# Patient Record
Sex: MALE | Race: WHITE | NOT HISPANIC OR LATINO | Employment: OTHER | URBAN - METROPOLITAN AREA
[De-identification: names, ages, dates, MRNs, and addresses within clinical notes are randomized per-mention and may not be internally consistent; named-entity substitution may affect disease eponyms.]

---

## 2018-02-19 ENCOUNTER — TRANSCRIBE ORDERS (OUTPATIENT)
Dept: ADMINISTRATIVE | Facility: HOSPITAL | Age: 38
End: 2018-02-19

## 2018-02-19 ENCOUNTER — OFFICE VISIT (OUTPATIENT)
Dept: OBGYN CLINIC | Facility: MEDICAL CENTER | Age: 38
End: 2018-02-19
Payer: COMMERCIAL

## 2018-02-19 VITALS
SYSTOLIC BLOOD PRESSURE: 133 MMHG | HEART RATE: 96 BPM | HEIGHT: 71 IN | DIASTOLIC BLOOD PRESSURE: 87 MMHG | BODY MASS INDEX: 35 KG/M2 | WEIGHT: 250 LBS

## 2018-02-19 DIAGNOSIS — G89.29 CHRONIC PAIN OF LEFT KNEE: ICD-10-CM

## 2018-02-19 DIAGNOSIS — M25.531 RIGHT WRIST PAIN: ICD-10-CM

## 2018-02-19 DIAGNOSIS — M25.551 RIGHT HIP PAIN: Primary | ICD-10-CM

## 2018-02-19 DIAGNOSIS — M25.851 RIGHT HIP IMPINGEMENT SYNDROME: ICD-10-CM

## 2018-02-19 DIAGNOSIS — M25.562 CHRONIC PAIN OF LEFT KNEE: ICD-10-CM

## 2018-02-19 PROCEDURE — 99204 OFFICE O/P NEW MOD 45 MIN: CPT | Performed by: FAMILY MEDICINE

## 2018-02-19 RX ORDER — CHOLECALCIFEROL (VITAMIN D3) 50 MCG
TABLET ORAL
COMMUNITY
Start: 2018-01-05

## 2018-02-19 RX ORDER — ATORVASTATIN CALCIUM 80 MG/1
TABLET, FILM COATED ORAL
COMMUNITY
Start: 2018-01-03 | End: 2022-05-09 | Stop reason: ALTCHOICE

## 2018-02-19 RX ORDER — MELOXICAM 15 MG/1
15 TABLET ORAL
COMMUNITY
End: 2021-03-25 | Stop reason: ALTCHOICE

## 2018-02-19 RX ORDER — CYCLOBENZAPRINE HCL 10 MG
10 TABLET ORAL
COMMUNITY
Start: 2017-09-01 | End: 2021-03-22

## 2018-02-19 RX ORDER — LOSARTAN POTASSIUM 25 MG/1
25 TABLET ORAL
COMMUNITY
Start: 2017-07-27 | End: 2022-05-09 | Stop reason: ALTCHOICE

## 2018-02-19 NOTE — ASSESSMENT & PLAN NOTE
Patient with a history of right hip dislocation with reduction in 2004  X-ray demonstrates impingement, please evaluate for acetabulum labrum tear
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
## 2018-02-20 DIAGNOSIS — M25.569 KNEE PAIN, UNSPECIFIED CHRONICITY, UNSPECIFIED LATERALITY: Primary | ICD-10-CM

## 2018-03-05 ENCOUNTER — HOSPITAL ENCOUNTER (OUTPATIENT)
Dept: CT IMAGING | Facility: HOSPITAL | Age: 38
Discharge: HOME | End: 2018-03-05
Attending: FAMILY MEDICINE
Payer: COMMERCIAL

## 2018-03-05 ENCOUNTER — HOSPITAL ENCOUNTER (OUTPATIENT)
Dept: RADIOLOGY | Facility: HOSPITAL | Age: 38
Discharge: HOME | End: 2018-03-05
Attending: FAMILY MEDICINE | Admitting: RADIOLOGY
Payer: COMMERCIAL

## 2018-03-05 DIAGNOSIS — M25.551 RIGHT HIP PAIN: ICD-10-CM

## 2018-03-05 PROCEDURE — 73701 CT LOWER EXTREMITY W/DYE: CPT

## 2018-03-05 PROCEDURE — 27095 INJECTION FOR HIP X-RAY: CPT

## 2018-03-05 PROCEDURE — 77002 NEEDLE LOCALIZATION BY XRAY: CPT

## 2018-03-05 RX ORDER — LIDOCAINE HYDROCHLORIDE 10 MG/ML
15 INJECTION, SOLUTION EPIDURAL; INFILTRATION; INTRACAUDAL; PERINEURAL ONCE
Status: CANCELLED | OUTPATIENT
Start: 2018-03-05 | End: 2018-03-05

## 2018-03-05 RX ORDER — 0.9 % SODIUM CHLORIDE 0.9 %
30 VIAL (ML) INJECTION ONCE
Status: CANCELLED | OUTPATIENT
Start: 2018-03-05 | End: 2018-03-05

## 2018-03-05 RX ORDER — LIDOCAINE HYDROCHLORIDE 10 MG/ML
15 INJECTION, SOLUTION EPIDURAL; INFILTRATION; INTRACAUDAL; PERINEURAL ONCE
Status: COMPLETED | OUTPATIENT
Start: 2018-03-05 | End: 2018-03-05

## 2018-03-05 RX ORDER — 0.9 % SODIUM CHLORIDE 0.9 %
50 VIAL (ML) INJECTION ONCE
Status: COMPLETED | OUTPATIENT
Start: 2018-03-05 | End: 2018-03-05

## 2018-03-05 RX ADMIN — SODIUM CHLORIDE 3 ML: 9 INJECTION, SOLUTION INTRAMUSCULAR; INTRAVENOUS; SUBCUTANEOUS at 13:45

## 2018-03-05 RX ADMIN — LIDOCAINE HYDROCHLORIDE 15 ML: 10 INJECTION, SOLUTION EPIDURAL; INFILTRATION; INTRACAUDAL; PERINEURAL at 13:45

## 2018-03-05 RX ADMIN — IOHEXOL 3 ML: 300 INJECTION, SOLUTION INTRAVENOUS at 13:44

## 2018-03-08 ENCOUNTER — OFFICE VISIT (OUTPATIENT)
Dept: OBGYN CLINIC | Facility: MEDICAL CENTER | Age: 38
End: 2018-03-08
Payer: COMMERCIAL

## 2018-03-08 VITALS
HEART RATE: 69 BPM | DIASTOLIC BLOOD PRESSURE: 87 MMHG | BODY MASS INDEX: 35 KG/M2 | SYSTOLIC BLOOD PRESSURE: 136 MMHG | HEIGHT: 71 IN | WEIGHT: 250 LBS

## 2018-03-08 DIAGNOSIS — M24.151 DEGENERATIVE TEAR OF ACETABULAR LABRUM OF RIGHT HIP: ICD-10-CM

## 2018-03-08 DIAGNOSIS — M25.851 RIGHT HIP IMPINGEMENT SYNDROME: Primary | ICD-10-CM

## 2018-03-08 PROCEDURE — 99214 OFFICE O/P EST MOD 30 MIN: CPT | Performed by: FAMILY MEDICINE

## 2018-03-08 NOTE — PROGRESS NOTES
Assessment:   No diagnosis found  Plan:     Problem List Items Addressed This Visit     Right hip impingement syndrome - Primary    Relevant Orders    Ambulatory referral to Orthopedic Surgery    Degenerative tear of acetabular labrum of right hip         Subjective:     Patient ID: Neris Acevedo is a 40 y o  male  Chief Complaint:  Patient presents today for follow-up of his hip pain and CT results  Pain continues as a throbbing, achy pain with frequent clicking and snapping in the hip  No weakness or swelling  No numbness or tingling  Allergy:  No Known Allergies  Medications:  all current active meds have been reviewed  Past Medical History:  Past Medical History:   Diagnosis Date    Fractures     Head injury     Hypertension     PTSD (post-traumatic stress disorder)      Past Surgical History:  Past Surgical History:   Procedure Laterality Date    ANKLE SURGERY      ARM DEBRIDEMENT      HIP SURGERY      KNEE SURGERY      SHOULDER SURGERY      WRIST SURGERY       Family History:  Family History   Problem Relation Age of Onset    Adopted: Yes     Social History:  History   Alcohol Use    Yes     History   Drug Use No     History   Smoking Status    Former Smoker   Smokeless Tobacco    Former User     Review of Systems   Constitutional: Negative  HENT: Negative  Eyes: Negative  Respiratory: Negative  Cardiovascular: Negative  Gastrointestinal: Negative  Genitourinary: Negative  Musculoskeletal: Positive for arthralgias and myalgias  Skin: Negative  Allergic/Immunologic: Negative  Neurological: Negative  Hematological: Negative  Psychiatric/Behavioral: Negative  Objective:  BP Readings from Last 1 Encounters:   03/08/18 136/87      Wt Readings from Last 1 Encounters:   03/08/18 113 kg (250 lb)      BMI:   Estimated body mass index is 34 87 kg/m² as calculated from the following:    Height as of this encounter: 5' 11" (1 803 m)      Weight as of this encounter: 113 kg (250 lb)  BSA:   Estimated body surface area is 2 32 meters squared as calculated from the following:    Height as of this encounter: 5' 11" (1 803 m)  Weight as of this encounter: 113 kg (250 lb)  Physical Exam   Constitutional: He appears well-developed  Eyes: Pupils are equal, round, and reactive to light  Neck: Normal range of motion  Pulmonary/Chest: Effort normal    Musculoskeletal: Normal range of motion  Neurological: He is alert  He has normal strength and normal reflexes  Gait normal    Skin: Skin is warm  Psychiatric: He has a normal mood and affect  Right Ankle Exam   Right ankle exam is normal     Range of Motion   The patient has normal right ankle ROM  Muscle Strength   The patient has normal right ankle strength  Other   Erythema: present       Left Knee Exam     Tenderness   The patient is experiencing tenderness in the lateral joint line  Range of Motion   Extension: normal   Flexion: normal     Tests   Santy:  Medial - negative Lateral - negative  Lachman:  Anterior - negative    Posterior - negative  Drawer:       Anterior - negative     Posterior - negative  Varus: negative  Valgus: negative  Pivot Shift: negative  Patellar Apprehension: negative    Other   Erythema: absent      Right Hip Exam   Right hip exam is normal      Range of Motion   Extension: normal   Flexion: normal   Internal Rotation: normal   External Rotation: normal   Abduction: normal   Adduction: normal     Muscle Strength   The patient has normal right hip strength  Tests   YOVANI: positive    Other   Erythema: absent      Right Hand Exam     Tenderness   The patient is experiencing tenderness in the dorsal area and snuff box  Range of Motion     Wrist   Extension: normal   Flexion: normal   Pronation: normal   Supination: normal     Hand   MP Thumb: normal   DIP Thumb: normal     Muscle Strength   The patient has normal right wrist strength      Tests   Phalens Sign: negative    Other   Erythema: absent  Sensation: normal  Pulse: present            I have personally reviewed pertinent films in PACS and my interpretation is There is a large right acetabular labral tear with a large portion of the anterior acetabular labrum absent

## 2018-06-01 ENCOUNTER — OFFICE VISIT (OUTPATIENT)
Dept: PHYSICAL THERAPY | Facility: CLINIC | Age: 38
End: 2018-06-01

## 2020-03-21 ENCOUNTER — HOSPITAL ENCOUNTER (EMERGENCY)
Facility: HOSPITAL | Age: 40
Discharge: HOME | End: 2020-03-22
Attending: EMERGENCY MEDICINE
Payer: MEDICARE

## 2020-03-21 ENCOUNTER — APPOINTMENT (EMERGENCY)
Dept: RADIOLOGY | Facility: HOSPITAL | Age: 40
End: 2020-03-21
Payer: MEDICARE

## 2020-03-21 DIAGNOSIS — I10 HYPERTENSION, UNSPECIFIED TYPE: Primary | ICD-10-CM

## 2020-03-21 LAB
ANION GAP SERPL CALCULATED.3IONS-SCNC: 9 MMOL/L (ref 4–13)
BASOPHILS # BLD AUTO: 0.04 THOUSANDS/ΜL (ref 0–0.1)
BASOPHILS NFR BLD AUTO: 1 % (ref 0–1)
BUN SERPL-MCNC: 19 MG/DL (ref 5–25)
CALCIUM SERPL-MCNC: 9 MG/DL (ref 8.3–10.1)
CHLORIDE SERPL-SCNC: 102 MMOL/L (ref 100–108)
CO2 SERPL-SCNC: 28 MMOL/L (ref 21–32)
CREAT SERPL-MCNC: 1.06 MG/DL (ref 0.6–1.3)
EOSINOPHIL # BLD AUTO: 0.36 THOUSAND/ΜL (ref 0–0.61)
EOSINOPHIL NFR BLD AUTO: 5 % (ref 0–6)
ERYTHROCYTE [DISTWIDTH] IN BLOOD BY AUTOMATED COUNT: 13.7 % (ref 11.6–15.1)
GFR SERPL CREATININE-BSD FRML MDRD: 89 ML/MIN/1.73SQ M
GLUCOSE SERPL-MCNC: 117 MG/DL (ref 65–140)
HCT VFR BLD AUTO: 43.2 % (ref 36.5–49.3)
HGB BLD-MCNC: 14.8 G/DL (ref 12–17)
LYMPHOCYTES # BLD AUTO: 2.99 THOUSANDS/ΜL (ref 0.6–4.47)
LYMPHOCYTES NFR BLD AUTO: 39 % (ref 14–44)
MCH RBC QN AUTO: 28.2 PG (ref 26.8–34.3)
MCHC RBC AUTO-ENTMCNC: 34.3 G/DL (ref 31.4–37.4)
MCV RBC AUTO: 82 FL (ref 82–98)
MONOCYTES # BLD AUTO: 0.63 THOUSAND/ΜL (ref 0.17–1.22)
MONOCYTES NFR BLD AUTO: 8 % (ref 4–12)
NEUTROPHILS # BLD AUTO: 3.59 THOUSANDS/ΜL (ref 1.85–7.62)
NEUTS SEG NFR BLD AUTO: 47 % (ref 43–75)
PLATELET # BLD AUTO: 328 THOUSANDS/UL (ref 149–390)
PMV BLD AUTO: 8.8 FL (ref 8.9–12.7)
POTASSIUM SERPL-SCNC: 3.6 MMOL/L (ref 3.5–5.3)
RBC # BLD AUTO: 5.24 MILLION/UL (ref 3.88–5.62)
SODIUM SERPL-SCNC: 139 MMOL/L (ref 136–145)
TROPONIN I SERPL-MCNC: <0.02 NG/ML
WBC # BLD AUTO: 7.61 THOUSAND/UL (ref 4.31–10.16)

## 2020-03-21 PROCEDURE — 36415 COLL VENOUS BLD VENIPUNCTURE: CPT | Performed by: EMERGENCY MEDICINE

## 2020-03-21 PROCEDURE — 84484 ASSAY OF TROPONIN QUANT: CPT | Performed by: EMERGENCY MEDICINE

## 2020-03-21 PROCEDURE — 80048 BASIC METABOLIC PNL TOTAL CA: CPT | Performed by: EMERGENCY MEDICINE

## 2020-03-21 PROCEDURE — 85025 COMPLETE CBC W/AUTO DIFF WBC: CPT | Performed by: EMERGENCY MEDICINE

## 2020-03-21 PROCEDURE — 71046 X-RAY EXAM CHEST 2 VIEWS: CPT

## 2020-03-21 PROCEDURE — 99284 EMERGENCY DEPT VISIT MOD MDM: CPT | Performed by: EMERGENCY MEDICINE

## 2020-03-21 PROCEDURE — 99284 EMERGENCY DEPT VISIT MOD MDM: CPT

## 2020-03-22 VITALS
HEART RATE: 67 BPM | WEIGHT: 251.32 LBS | HEIGHT: 71 IN | DIASTOLIC BLOOD PRESSURE: 90 MMHG | BODY MASS INDEX: 35.19 KG/M2 | OXYGEN SATURATION: 98 % | TEMPERATURE: 98.9 F | SYSTOLIC BLOOD PRESSURE: 130 MMHG | RESPIRATION RATE: 22 BRPM

## 2020-03-22 PROCEDURE — 96374 THER/PROPH/DIAG INJ IV PUSH: CPT

## 2020-03-22 RX ORDER — ACETAMINOPHEN 325 MG/1
975 TABLET ORAL ONCE
Status: COMPLETED | OUTPATIENT
Start: 2020-03-22 | End: 2020-03-22

## 2020-03-22 RX ORDER — LOSARTAN POTASSIUM 25 MG/1
25 TABLET ORAL DAILY
Qty: 30 TABLET | Refills: 0 | Status: SHIPPED | OUTPATIENT
Start: 2020-03-22 | End: 2022-05-09 | Stop reason: SDUPTHER

## 2020-03-22 RX ORDER — LOSARTAN POTASSIUM 25 MG/1
25 TABLET ORAL ONCE
Status: COMPLETED | OUTPATIENT
Start: 2020-03-22 | End: 2020-03-22

## 2020-03-22 RX ORDER — KETOROLAC TROMETHAMINE 30 MG/ML
30 INJECTION, SOLUTION INTRAMUSCULAR; INTRAVENOUS ONCE
Status: COMPLETED | OUTPATIENT
Start: 2020-03-22 | End: 2020-03-22

## 2020-03-22 RX ADMIN — KETOROLAC TROMETHAMINE 30 MG: 30 INJECTION, SOLUTION INTRAMUSCULAR at 01:40

## 2020-03-22 RX ADMIN — ACETAMINOPHEN 975 MG: 325 TABLET ORAL at 00:12

## 2020-03-22 RX ADMIN — LOSARTAN POTASSIUM 25 MG: 25 TABLET, FILM COATED ORAL at 01:41
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
## 2021-02-08 RX ORDER — ACETAMINOPHEN 160 MG
TABLET,DISINTEGRATING ORAL
COMMUNITY

## 2021-02-08 RX ORDER — LORATADINE 10 MG/1
TABLET ORAL
COMMUNITY

## 2021-02-10 ENCOUNTER — OFFICE VISIT (OUTPATIENT)
Dept: GASTROENTEROLOGY | Facility: CLINIC | Age: 41
End: 2021-02-10
Payer: COMMERCIAL

## 2021-02-10 VITALS
BODY MASS INDEX: 34.73 KG/M2 | SYSTOLIC BLOOD PRESSURE: 140 MMHG | DIASTOLIC BLOOD PRESSURE: 90 MMHG | HEART RATE: 86 BPM | WEIGHT: 249 LBS

## 2021-02-10 DIAGNOSIS — D13.2 DUODENAL ADENOMA: Primary | ICD-10-CM

## 2021-02-10 PROCEDURE — 99203 OFFICE O/P NEW LOW 30 MIN: CPT | Performed by: PHYSICIAN ASSISTANT

## 2021-02-10 RX ORDER — ALPRAZOLAM 0.5 MG/1
TABLET ORAL
COMMUNITY
Start: 2020-08-19

## 2021-02-10 RX ORDER — LIDOCAINE 50 MG/G
PATCH TOPICAL
COMMUNITY
Start: 2021-02-04 | End: 2021-03-22

## 2021-02-10 RX ORDER — SERTRALINE HYDROCHLORIDE 100 MG/1
TABLET, FILM COATED ORAL
COMMUNITY
Start: 2020-08-19

## 2021-02-10 NOTE — PROGRESS NOTES
[facility] - Outpatient Consultation  Cristiana Rocha 45 y o  male MRN: [id number]  Encounter: [id number]          ASSESSMENT AND PLAN:      1  Duodenal adenoma  Diagnosed and removed in 2019  It was removed but could not be well visualized with a standard endoscopy scope and so a side viewing scope was requested - this was performed by Dr Julio César Castellanos at [facility] who did not note any residual tissue  [facility]   Will plan EGD with side viewing scope here but patient understands it a polyp is found he may need [place] [facility] [place] to proceed this way  Case d/w Dr Gary Taylor    ______________________________________________________________________    HPI:    80-year-old male presents for evaluation prior to scheduling EGD  He underwent a routine upper endoscopy in 2019 at the [place]  At that time he was found to have a duodenal polyp which was  removed  The pathology showed adenomatous tissue  He was then referred for EGD with side viewing scope and EMR if residual tissue  The enteroscopy showed no residual tissue  He was noted to have esophagitis and a hiatal hernia  He reports issues with panic attacks after eating  He feels his heart is racing any short of breath  He has been prescribed Xanax for this which does help  He denies any heartburn or dysphagia  REVIEW OF SYSTEMS:    CONSTITUTIONAL: Denies any fever, chills, rigors, and weight loss  HEENT: No earache or tinnitus  Denies hearing loss or visual disturbances  CARDIOVASCULAR: No chest pain or palpitations  RESPIRATORY: Denies any cough, hemoptysis, shortness of breath or dyspnea on exertion  GASTROINTESTINAL: As noted in the History of Present Illness  GENITOURINARY: No problems with urination  Denies any hematuria or dysuria  NEUROLOGIC: No dizziness or vertigo, denies headaches  MUSCULOSKELETAL: Denies any muscle or joint pain  SKIN: Denies skin rashes or itching  ENDOCRINE: Denies excessive thirst  Denies intolerance to heat or cold  PSYCHOSOCIAL: Denies depression or anxiety  Denies any recent memory loss  Historical Information   Past Medical History:   Diagnosis Date    Fractures     Head injury     Hypertension     PTSD (post-traumatic stress disorder)      Past Surgical History:   Procedure Laterality Date    ANKLE SURGERY      ARM DEBRIDEMENT      HIP SURGERY      KNEE SURGERY      SHOULDER SURGERY      WRIST SURGERY       Social History   Social History     Substance and Sexual Activity   Alcohol Use Yes     Social History     Substance and Sexual Activity   Drug Use No     Social History     Tobacco Use   Smoking Status Former Smoker   Smokeless Tobacco Former User     Family History   Adopted: Yes       Meds/Allergies       Current Outpatient Medications:     ALPRAZolam (XANAX) 0 5 mg tablet    Cholecalciferol (Vitamin D3) 50 MCG (2000 UT) capsule    cyclobenzaprine (FLEXERIL) 10 mg tablet    loratadine (CLARITIN) 10 mg tablet    losartan (COZAAR) 25 mg tablet    losartan (COZAAR) 25 mg tablet    sertraline (ZOLOFT) 100 mg tablet    VITAMIN D3 SUPER STRENGTH 2000 units tablet    lidocaine (LIDODERM) 5 %    LIPITOR 80 MG tablet    meloxicam (MOBIC) 15 mg tablet    No Known Allergies        Objective     Blood pressure 140/90, pulse 86, weight 113 kg (249 lb)  Body mass index is 34 73 kg/m²  PHYSICAL EXAM:      General Appearance:   Alert, cooperative, no distress   HEENT:   Normocephalic, atraumatic, anicteric      Neck:  Supple, symmetrical, trachea midline   Lungs:   Clear to auscultation bilaterally; no rales, rhonchi or wheezing; respirations unlabored    Heart[de-identified]   Regular rate and rhythm; no murmur, rub, or gallop     Abdomen:   Soft, non-tender, non-distended; normal bowel sounds; no masses, no organomegaly    Genitalia:   Deferred    Rectal:   Deferred    Extremities:  No cyanosis, clubbing or edema    Pulses:  2+ and symmetric    Skin:  No jaundice, rashes, or lesions    Lymph nodes:  No palpable cervical lymphadenopathy        Lab Results:   No visits with results within 1 Day(s) from this visit  Latest known visit with results is:   Admission on 03/21/2020, Discharged on 03/22/2020   Component Date Value    WBC 03/21/2020 7 61     RBC 03/21/2020 5 24     Hemoglobin 03/21/2020 14 8     Hematocrit 03/21/2020 43 2     MCV 03/21/2020 82     MCH 03/21/2020 28 2     MCHC 03/21/2020 34 3     RDW 03/21/2020 13 7     MPV 03/21/2020 8 8*    Platelets 96/77/3011 328     Neutrophils Relative 03/21/2020 47     Lymphocytes Relative 03/21/2020 39     Monocytes Relative 03/21/2020 8     Eosinophils Relative 03/21/2020 5     Basophils Relative 03/21/2020 1     Neutrophils Absolute 03/21/2020 3 59     Lymphocytes Absolute 03/21/2020 2 99     Monocytes Absolute 03/21/2020 0 63     Eosinophils Absolute 03/21/2020 0 36     Basophils Absolute 03/21/2020 0 04     Sodium 03/21/2020 139     Potassium 03/21/2020 3 6     Chloride 03/21/2020 102     CO2 03/21/2020 28     ANION GAP 03/21/2020 9     BUN 03/21/2020 19     Creatinine 03/21/2020 1 06     Glucose 03/21/2020 117     Calcium 03/21/2020 9 0     eGFR 03/21/2020 89     Troponin I 03/21/2020 <0 02          Radiology Results:   No results found

## 2021-02-15 ENCOUNTER — TELEPHONE (OUTPATIENT)
Dept: GASTROENTEROLOGY | Facility: CLINIC | Age: 41
End: 2021-02-15

## 2021-03-04 ENCOUNTER — PREP FOR PROCEDURE (OUTPATIENT)
Dept: GASTROENTEROLOGY | Facility: CLINIC | Age: 41
End: 2021-03-04

## 2021-03-04 DIAGNOSIS — D13.2 DUODENAL ADENOMA: Primary | ICD-10-CM

## 2021-03-22 ENCOUNTER — CONSULT (OUTPATIENT)
Dept: PAIN MEDICINE | Facility: CLINIC | Age: 41
End: 2021-03-22
Payer: MEDICARE

## 2021-03-22 VITALS
BODY MASS INDEX: 35.87 KG/M2 | SYSTOLIC BLOOD PRESSURE: 145 MMHG | HEIGHT: 71 IN | RESPIRATION RATE: 16 BRPM | DIASTOLIC BLOOD PRESSURE: 92 MMHG | WEIGHT: 256.2 LBS | HEART RATE: 76 BPM

## 2021-03-22 DIAGNOSIS — M54.2 CHRONIC NECK PAIN: ICD-10-CM

## 2021-03-22 DIAGNOSIS — M79.18 MYOFASCIAL PAIN SYNDROME: ICD-10-CM

## 2021-03-22 DIAGNOSIS — G89.29 CHRONIC NECK PAIN: ICD-10-CM

## 2021-03-22 DIAGNOSIS — M62.838 CERVICAL PARASPINAL MUSCLE SPASM: Primary | ICD-10-CM

## 2021-03-22 DIAGNOSIS — M48.02 CERVICAL SPINAL STENOSIS: ICD-10-CM

## 2021-03-22 PROCEDURE — 99204 OFFICE O/P NEW MOD 45 MIN: CPT | Performed by: STUDENT IN AN ORGANIZED HEALTH CARE EDUCATION/TRAINING PROGRAM
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
## 2021-03-22 NOTE — PATIENT INSTRUCTIONS
Trigger Point Injection   AMBULATORY CARE:   A trigger point injection  is used to relax a muscle knot  This helps relieve pain  You may be able to have more than one trigger point treated during a session  How to prepare for a trigger point injection:   · Your healthcare provider will tell you how to prepare  Arrange to have someone drive you home after the injection  · Tell your provider about all medicines you take, including pain medicine, blood thinners, and muscle relaxers  He or she will tell you if you need to stop any medicine for the injection, and when to stop  He or she will tell you which medicines to take or not take on the day of the injection  · Tell your provider about all your allergies, including to any pain medicine  What will happen during a trigger point injection:   · You may be sitting or lying, depending on where the trigger point is located  Your healthcare provider will feel for a knot in the muscle  He or she may capri your skin over the knot  · Your provider will put a needle through your skin and into the trigger point  Saline (salt solution), pain relievers, or other medicines may be pushed through the needle into the trigger point  Your provider may use only a dry needle (no medicine)  He or she will pull the needle almost all the way out and then push it in again  He or she will repeat this several times until the muscle stops twitching or feels relaxed  · Your provider will remove the needle and stretch the muscle area  He or she may apply pressure to the area for 2 minutes  A bandage will be put over the injection site to prevent bleeding or an infection  What to expect after a trigger point injection:   · You may feel pain relief right away  It is normal for some pain to start again 2 hours later  An ice pack or over-the-counter pain medicine can help lower the pain  · You may feel sore in the injection site for a few days   If you need another injection in the same area, wait until the area is not sore  · Your healthcare provider may give you specific activity instructions to follow at home or recommend physical therapy  In general, you should try to stay active  Avoid strenuous activity for the first 3 or 4 days after the injection  · Do not have more injections if you still have trigger point pain after 2 or 3 injections  Risks of a trigger point injection:  You may have a severe allergic reaction to pain medicine injected  The injection may be painful, or you may be sore where you got the injection  You may bleed, bruise, or develop an infection in the injection area  The injection may cause you to feel faint  Rarely, the needle may cause muscle or blood vessel damage or your lung may collapse if you get the injection near your chest   Call your local emergency number (911 in the [address] [place] [address],[address]), or have someone call if:   · Your mouth and throat are swollen  · You are wheezing or have trouble breathing  · You have chest pain or your heart is beating faster than usual     · You feel like you are going to faint  When should I seek immediate care? · Your face is red or swollen  · You have hives that spread over your body  · You feel weak or dizzy  Call your doctor or pain specialist if:   · You have a fever within 1 week of the injection  · You have redness or swelling within 1 week of the injection  · You have new or worsening pain near the injection site  · You have questions or concerns about your condition or care  Self-care:   · Stay active after you have trigger point injections  Gently move your joints through their full range of motion during the first week  Avoid strenuous activity for 3 or 4 days  · Do regular stretches of the trigger point muscle  Place gentle pressure on the trigger point, and then stretch the muscle  Ask your healthcare provider for more information about how to stretch and apply pressure      · Apply ice to the injection site  Use an ice pack, or put ice in a plastic bag  Cover the bag with a towel before you apply it  Apply ice for 15 to 20 minutes every hour, or as directed  · Apply heat to trigger point sites  Heat can help relax muscles and relieve trigger point pain  Use a heat pack or a heating pad set on low  Apply heat for 15 minutes every hour, or as directed  Follow up with your doctor or pain specialist as directed:  Write down your questions so you remember to ask them during your visits  © Copyright [address] Information is for End User's use only and may not be sold, redistributed or otherwise used for commercial purposes  All illustrations and images included in CareNotes® are the copyrighted property of A D A M , Inc  or [address] [place], [place] [address]kay trang   The above information is an  only  It is not intended as medical advice for individual conditions or treatments  Talk to your doctor, nurse or pharmacist before following any medical regimen to see if it is safe and effective for you

## 2021-03-25 ENCOUNTER — ANESTHESIA EVENT (OUTPATIENT)
Dept: GASTROENTEROLOGY | Facility: HOSPITAL | Age: 41
End: 2021-03-25

## 2021-03-25 ENCOUNTER — ANESTHESIA (OUTPATIENT)
Dept: GASTROENTEROLOGY | Facility: HOSPITAL | Age: 41
End: 2021-03-25

## 2021-03-25 ENCOUNTER — HOSPITAL ENCOUNTER (OUTPATIENT)
Dept: GASTROENTEROLOGY | Facility: HOSPITAL | Age: 41
Setting detail: OUTPATIENT SURGERY
Discharge: HOME | End: 2021-03-25
Attending: INTERNAL MEDICINE
Payer: COMMERCIAL

## 2021-03-25 VITALS
RESPIRATION RATE: 20 BRPM | BODY MASS INDEX: 35.46 KG/M2 | OXYGEN SATURATION: 97 % | DIASTOLIC BLOOD PRESSURE: 80 MMHG | SYSTOLIC BLOOD PRESSURE: 133 MMHG | TEMPERATURE: 97.8 F | HEART RATE: 70 BPM | WEIGHT: 253.31 LBS | HEIGHT: 71 IN

## 2021-03-25 DIAGNOSIS — D13.2 DUODENAL ADENOMA: ICD-10-CM

## 2021-03-25 PROCEDURE — 88305 TISSUE EXAM BY PATHOLOGIST: CPT | Performed by: PATHOLOGY

## 2021-03-25 PROCEDURE — 43239 EGD BIOPSY SINGLE/MULTIPLE: CPT | Performed by: INTERNAL MEDICINE

## 2021-03-25 RX ORDER — SODIUM CHLORIDE, SODIUM LACTATE, POTASSIUM CHLORIDE, CALCIUM CHLORIDE 600; 310; 30; 20 MG/100ML; MG/100ML; MG/100ML; MG/100ML
125 INJECTION, SOLUTION INTRAVENOUS CONTINUOUS
Status: CANCELLED | OUTPATIENT
Start: 2021-03-25

## 2021-03-25 RX ORDER — CYCLOBENZAPRINE HCL 5 MG
5 TABLET ORAL 3 TIMES DAILY PRN
COMMUNITY
End: 2022-08-03

## 2021-03-25 RX ORDER — GLYCOPYRROLATE 0.2 MG/ML
INJECTION INTRAMUSCULAR; INTRAVENOUS AS NEEDED
Status: DISCONTINUED | OUTPATIENT
Start: 2021-03-25 | End: 2021-03-25

## 2021-03-25 RX ORDER — SODIUM CHLORIDE, SODIUM LACTATE, POTASSIUM CHLORIDE, CALCIUM CHLORIDE 600; 310; 30; 20 MG/100ML; MG/100ML; MG/100ML; MG/100ML
INJECTION, SOLUTION INTRAVENOUS CONTINUOUS PRN
Status: DISCONTINUED | OUTPATIENT
Start: 2021-03-25 | End: 2021-03-25

## 2021-03-25 RX ORDER — LIDOCAINE HYDROCHLORIDE 10 MG/ML
INJECTION, SOLUTION EPIDURAL; INFILTRATION; INTRACAUDAL; PERINEURAL AS NEEDED
Status: DISCONTINUED | OUTPATIENT
Start: 2021-03-25 | End: 2021-03-25

## 2021-03-25 RX ORDER — PROPOFOL 10 MG/ML
INJECTION, EMULSION INTRAVENOUS AS NEEDED
Status: DISCONTINUED | OUTPATIENT
Start: 2021-03-25 | End: 2021-03-25

## 2021-03-25 RX ORDER — KETAMINE HCL IN NACL, ISO-OSM 100MG/10ML
SYRINGE (ML) INJECTION AS NEEDED
Status: DISCONTINUED | OUTPATIENT
Start: 2021-03-25 | End: 2021-03-25

## 2021-03-25 RX ADMIN — PROPOFOL 120 MG: 10 INJECTION, EMULSION INTRAVENOUS at 14:21

## 2021-03-25 RX ADMIN — PROPOFOL 30 MG: 10 INJECTION, EMULSION INTRAVENOUS at 14:26

## 2021-03-25 RX ADMIN — GLYCOPYRROLATE 0.2 MG: 0.2 INJECTION, SOLUTION INTRAMUSCULAR; INTRAVENOUS at 14:18

## 2021-03-25 RX ADMIN — PROPOFOL 50 MG: 10 INJECTION, EMULSION INTRAVENOUS at 14:31

## 2021-03-25 RX ADMIN — PROPOFOL 30 MG: 10 INJECTION, EMULSION INTRAVENOUS at 14:23

## 2021-03-25 RX ADMIN — PROPOFOL 20 MG: 10 INJECTION, EMULSION INTRAVENOUS at 14:25

## 2021-03-25 RX ADMIN — LIDOCAINE HYDROCHLORIDE 100 MG: 10 INJECTION, SOLUTION EPIDURAL; INFILTRATION; INTRACAUDAL; PERINEURAL at 14:20

## 2021-03-25 RX ADMIN — SODIUM CHLORIDE, SODIUM LACTATE, POTASSIUM CHLORIDE, AND CALCIUM CHLORIDE: .6; .31; .03; .02 INJECTION, SOLUTION INTRAVENOUS at 14:12

## 2021-03-25 RX ADMIN — Medication 30 MG: at 14:20

## 2021-03-25 NOTE — ANESTHESIA PREPROCEDURE EVALUATION
Procedure:  EGD    Relevant Problems   No relevant active problems      1  Duodenal adenoma  Diagnosed and removed in 2019  It was removed but could not be well visualized with a standard endoscopy scope and so a side viewing scope was requested - this was performed by Dr Mattie Padilla at [facility] who did not note any residual tissue     Physical Exam    Airway    Mallampati score: II  TM Distance: >3 FB  Neck ROM: full     Dental       Cardiovascular  Cardiovascular exam normal    Pulmonary  Pulmonary exam normal     Other Findings      Fractures  Hypertension    Head injury  PTSD (post-traumatic stress disorder)    Hyperlipidemia          Anesthesia Plan  ASA Score- 2     Anesthesia Type- IV sedation with anesthesia with ASA Monitors  Additional Monitors:   Airway Plan:           Plan Factors-Exercise tolerance (METS): >4 METS  Chart reviewed  Existing labs reviewed  Patient summary reviewed  Induction- intravenous  Postoperative Plan-     Informed Consent- Anesthetic plan and risks discussed with patient  I personally reviewed this patient with the CRNA  Discussed and agreed on the Anesthesia Plan with the CRNA  Conrad Falk

## 2021-03-25 NOTE — DISCHARGE INSTRUCTIONS
Upper Endoscopy   WHAT YOU NEED TO KNOW:   An upper endoscopy is also called an upper gastrointestinal (GI) endoscopy, or an esophagogastroduodenoscopy (EGD)  You may feel bloated, gassy, or have some abdominal discomfort after your procedure  Your throat may be sore for 24 to 36 hours  You may burp or pass gas from air that is still inside your body  DISCHARGE INSTRUCTIONS:   Call 911 if:   · You have sudden chest pain or trouble breathing  Seek care immediately if:   · You feel dizzy or faint  · You have trouble swallowing  · You have severe throat pain  · Your bowel movements are very dark or black  · Your abdomen is hard and firm and you have severe pain  · You vomit blood  Contact your healthcare provider if:   · You feel full or bloated and cannot burp or pass gas  · You have not had a bowel movement for 3 days after your procedure  · You have neck pain  · You have a fever or chills  · You have nausea or are vomiting  · You have a rash or hives  · You have questions or concerns about your endoscopy  Relieve a sore throat:  Suck on throat lozenges or crushed ice  Gargle with a small amount of warm salt water  Mix 1 teaspoon of salt and 1 cup of warm water to make salt water  Relieve gas and discomfort from bloating:  Lie on your right side with a heating pad on your abdomen  Take short walks to help pass gas  Eat small meals until bloating is relieved  Rest after your procedure:  Do not drive or make important decisions until the day after your procedure  Return to your normal activity as directed  You can usually return to work the day after your procedure  Follow up with your healthcare provider as directed:  Write down your questions so you remember to ask them during your visits  © Copyright [address] Information is for End User's use only and may not be sold, redistributed or otherwise used for commercial purposes   All illustrations and images included in CareNotes® are the copyrighted property of A D A M , Inc  or Dom Bowden   The above information is an  only  It is not intended as medical advice for individual conditions or treatments  Talk to your doctor, nurse or pharmacist before following any medical regimen to see if it is safe and effective for you

## 2021-03-25 NOTE — ANESTHESIA POSTPROCEDURE EVALUATION
Post-Op Assessment Note    CV Status:  Stable    Pain management: adequate     Mental Status:  Awake   Hydration Status:  Euvolemic   PONV Controlled:  Controlled   Airway Patency:  Patent      Post Op Vitals Reviewed: Yes      Staff: CRNA         No complications documented      /90 (03/25/21 1437)    Temp 97 8 °F (36 6 °C) (03/25/21 1437)    Pulse 74 (03/25/21 1437)   Resp 20 (03/25/21 1437)    SpO2 97 % (03/25/21 1437)

## 2021-03-25 NOTE — H&P
History and Physical - [facility]  Patricia Israel 45 y o  male MRN: [id number]      HPI: Patricia Israel is a 45y o  year old male who presents for evaluation of a history of a periampullary adenoma, previously resected      REVIEW OF SYSTEMS: Per the HPI, and otherwise unremarkable  Historical Information   Past Medical History:   Diagnosis Date    Fractures     Head injury     Hyperlipidemia     Hypertension     PTSD (post-traumatic stress disorder)      Past Surgical History:   Procedure Laterality Date    ANKLE SURGERY      ARM DEBRIDEMENT      COLONOSCOPY      HIP SURGERY      KNEE SURGERY      SHOULDER SURGERY      WRIST SURGERY       Social History   Social History     Substance and Sexual Activity   Alcohol Use Not Currently     Social History     Substance and Sexual Activity   Drug Use No     Social History     Tobacco Use   Smoking Status Former Smoker   Smokeless Tobacco Former User     Family History   Adopted: Yes       Meds/Allergies     (Not in a hospital admission)      No Known Allergies    Objective     Blood pressure 135/80, pulse 66, temperature 97 6 °F (36 4 °C), temperature source Temporal, resp  rate 16, height 5' 11" (1 803 m), weight 115 kg (253 lb 4 9 oz), SpO2 95 %  PHYSICAL EXAM    Gen: NAD  CV: RRR  CHEST: Clear  ABD: soft, NT/ND  EXT: no edema      ASSESSMENT/PLAN:  This is a 45y o  year old male here for side-viewing duodenitis could be to rule out recurrent adenoma, and he is stable and optimized for his procedure

## 2021-03-29 ENCOUNTER — TELEPHONE (OUTPATIENT)
Dept: RADIOLOGY | Facility: CLINIC | Age: 41
End: 2021-03-29

## 2021-04-08 ENCOUNTER — PROCEDURE VISIT (OUTPATIENT)
Dept: PAIN MEDICINE | Facility: CLINIC | Age: 41
End: 2021-04-08
Payer: MEDICARE

## 2021-04-08 VITALS
DIASTOLIC BLOOD PRESSURE: 88 MMHG | RESPIRATION RATE: 16 BRPM | HEIGHT: 71 IN | SYSTOLIC BLOOD PRESSURE: 129 MMHG | WEIGHT: 253 LBS | BODY MASS INDEX: 35.42 KG/M2 | HEART RATE: 74 BPM

## 2021-04-08 DIAGNOSIS — M79.18 MYOFASCIAL PAIN SYNDROME: Primary | ICD-10-CM

## 2021-04-08 PROCEDURE — 20553 NJX 1/MLT TRIGGER POINTS 3/>: CPT | Performed by: STUDENT IN AN ORGANIZED HEALTH CARE EDUCATION/TRAINING PROGRAM

## 2021-04-08 PROCEDURE — 76942 ECHO GUIDE FOR BIOPSY: CPT | Performed by: STUDENT IN AN ORGANIZED HEALTH CARE EDUCATION/TRAINING PROGRAM

## 2021-04-08 RX ORDER — METHYLPREDNISOLONE ACETATE 40 MG/ML
40 INJECTION, SUSPENSION INTRA-ARTICULAR; INTRALESIONAL; INTRAMUSCULAR; SOFT TISSUE ONCE
Status: COMPLETED | OUTPATIENT
Start: 2021-04-08 | End: 2021-04-08

## 2021-04-08 RX ORDER — BUPIVACAINE HYDROCHLORIDE 2.5 MG/ML
7 INJECTION, SOLUTION EPIDURAL; INFILTRATION; INTRACAUDAL ONCE
Status: COMPLETED | OUTPATIENT
Start: 2021-04-08 | End: 2021-04-08

## 2021-04-08 RX ADMIN — BUPIVACAINE HYDROCHLORIDE 7 ML: 2.5 INJECTION, SOLUTION EPIDURAL; INFILTRATION; INTRACAUDAL at 09:30

## 2021-04-08 RX ADMIN — METHYLPREDNISOLONE ACETATE 40 MG: 40 INJECTION, SUSPENSION INTRA-ARTICULAR; INTRALESIONAL; INTRAMUSCULAR; SOFT TISSUE at 09:29

## 2021-04-08 NOTE — PROGRESS NOTES
Inj Trigger Point Single/Multiple     Date/Time 4/8/2021 8:50 AM     Performed by  Wendie Hernandez MD     Authorized by Wendie Hernandez MD      Universal Protocol   Consent: Written consent obtained  Risks and benefits: risks, benefits and alternatives were discussed  Consent given by: patient  Time out: Immediately prior to procedure a "time out" was called to verify the correct patient, procedure, equipment, support staff and site/side marked as required  Timeout called at: 4/8/2021 8:50 AM   Patient understanding: patient states understanding of the procedure being performed  Patient consent: the patient's understanding of the procedure matches consent given  Procedure consent: procedure consent matches procedure scheduled  Relevant documents: relevant documents present and verified  Test results: test results available and properly labeled  Site marked: the operative site was marked  Radiology Images displayed and confirmed  If images not available, report reviewed: imaging studies available  Required items: required blood products, implants, devices, and special equipment available  Patient identity confirmed: verbally with patient        Local anesthesia used: yes      Anesthesia: local infiltration     Anesthesia   Local anesthesia used: yes  Local Anesthetic: bupivacaine 0 25% without epinephrine  Anesthetic total: 7 mL     Sedation   Patient sedated: no        Specimen: no    Culture: no   Procedure Details   Procedure Notes: PATIENT NAME:  Kat Olguin    MR#:  [id number]    YOB: 1980    DATE OF PROCEDURE:  04/08/21    PROCEDURE:  Trigger point injection x 6 with local anesthetic and steroid in the right splenius capitis, right trapezius, right rhomboid, and right serratus muscle groups under ultrasound guidance  ATTENDING PHYSICIAN:  ALEXY Santos  PREPROCEDURE DIAGNOSIS:  Myofascial pain with identifiable trigger points      POSTPROCEDURE DIAGNOSIS:  Myofascial pain with identifiable trigger points  ANESTHESIA:  Local    ESTIMATED BLOOD LOSS:  Minimal    COMPLICATIONS: None     CONSENT:  Today's procedure, its potential benefits as well as its risks and potential side effects were reviewed  Discussed risks of the procedure include bleeding, infection, nerve irritation or damage, reactions to the medications, failure of the pain to improve and exacerbation of the pain were explained to the patient, who verbalized understanding and who wished to proceed  Informed consent was signed  DESCRIPTION OF THE PROCEDURE:  After informed consent was obtained, the patient was placed in the seated position  6 trigger points were identified via palpation and marked with a surgical skin marker  The skin was prepped with antiseptic in the usual sterile fashion  Strict aseptic technique was utilized throughout the procedure  Using ultrasound guidance a 25 gauge, 2inch needle was then advanced into each identified trigger point  Care was taken to visualize the entirety of the needle throughout the injection  An injectate consisting of 7 ml of 0 25% marcaine with 1 mL of 40mg/mL depo-medrol was slowly injected in divided doses after negative aspiration  The needle was removed with tip intact  The patient tolerated the procedure and hemostasis was maintained  There were no apparent paresthesias or complications  The skin was wiped clean, and a band-aid was placed as appropriate  The patient was monitored for an appropriate period of time following the procedure and remained hemodynamically stable and neurovascularly intact  The patient was ultimately discharged to home with supervision in good condition and instructed to call the office to report the response     Patient tolerance: Patient tolerated the procedure well with no immediate complications

## 2021-05-19 ENCOUNTER — OFFICE VISIT (OUTPATIENT)
Dept: URGENT CARE | Facility: CLINIC | Age: 41
End: 2021-05-19
Payer: MEDICARE

## 2021-05-19 VITALS
BODY MASS INDEX: 35.28 KG/M2 | HEART RATE: 80 BPM | RESPIRATION RATE: 16 BRPM | DIASTOLIC BLOOD PRESSURE: 92 MMHG | OXYGEN SATURATION: 97 % | HEIGHT: 71 IN | SYSTOLIC BLOOD PRESSURE: 140 MMHG | WEIGHT: 252 LBS | TEMPERATURE: 98 F

## 2021-05-19 DIAGNOSIS — J01.00 ACUTE MAXILLARY SINUSITIS, RECURRENCE NOT SPECIFIED: Primary | ICD-10-CM

## 2021-05-19 PROCEDURE — G0463 HOSPITAL OUTPT CLINIC VISIT: HCPCS | Performed by: PHYSICIAN ASSISTANT

## 2021-05-19 PROCEDURE — 99213 OFFICE O/P EST LOW 20 MIN: CPT | Performed by: PHYSICIAN ASSISTANT

## 2021-05-19 RX ORDER — AMOXICILLIN AND CLAVULANATE POTASSIUM 875; 125 MG/1; MG/1
1 TABLET, FILM COATED ORAL EVERY 12 HOURS SCHEDULED
Qty: 14 TABLET | Refills: 0 | Status: SHIPPED | OUTPATIENT
Start: 2021-05-19 | End: 2021-05-26

## 2021-05-19 NOTE — PATIENT INSTRUCTIONS
Hydration and rest  Tylenol and motrin  Nasal saline spray  mucinex and flonase  Start augmentin  PCP follow up  Return to clinic with worsening symptoms  Sinusitis   WHAT YOU NEED TO KNOW:   Sinusitis is inflammation or infection of your sinuses  It is most often caused by a virus  Acute sinusitis may last up to 12 weeks  Chronic sinusitis lasts longer than 12 weeks  Recurrent sinusitis means you have 4 or more times in 1 year  DISCHARGE INSTRUCTIONS:   Return to the emergency department if:   · Your eye and eyelid are red, swollen, and painful  · You cannot open your eye  · You have vision changes, such as double vision  · Your eyeball bulges out or you cannot move your eye  · You are more sleepy than normal, or you notice changes in your ability to think, move, or talk  · You have a stiff neck, a fever, or a bad headache  · You have swelling of your forehead or scalp  Contact your healthcare provider if:   · Your symptoms do not improve after 3 days  · Your symptoms do not go away after 10 days  · You have nausea and are vomiting  · Your nose is bleeding  · You have questions or concerns about your condition or care  Medicines: Your symptoms may go away on their own  Your healthcare provider may recommend watchful waiting for up to 10 days before starting antibiotics  You may  need any of the following:  · Acetaminophen  decreases pain and fever  It is available without a doctor's order  Ask how much to take and how often to take it  Follow directions  Read the labels of all other medicines you are using to see if they also contain acetaminophen, or ask your doctor or pharmacist  Acetaminophen can cause liver damage if not taken correctly  Do not use more than 4 grams (4,000 milligrams) total of acetaminophen in one day  · NSAIDs , such as ibuprofen, help decrease swelling, pain, and fever  This medicine is available with or without a doctor's order   NSAIDs can cause stomach bleeding or kidney problems in certain people  If you take blood thinner medicine, always ask your healthcare provider if NSAIDs are safe for you  Always read the medicine label and follow directions  · Nasal steroid sprays  may help decrease inflammation in your nose and sinuses  · Decongestants  help reduce swelling and drain mucus in the nose and sinuses  They may help you breathe easier  · Antihistamines  help dry mucus in the nose and relieve sneezing  · Antibiotics  help treat or prevent a bacterial infection  · Take your medicine as directed  Contact your healthcare provider if you think your medicine is not helping or if you have side effects  Tell him or her if you are allergic to any medicine  Keep a list of the medicines, vitamins, and herbs you take  Include the amounts, and when and why you take them  Bring the list or the pill bottles to follow-up visits  Carry your medicine list with you in case of an emergency  Self-care:   · Rinse your sinuses  Use a sinus rinse device to rinse your nasal passages with a saline (salt water) solution or distilled water  Do not use tap water  This will help thin the mucus in your nose and rinse away pollen and dirt  It will also help reduce swelling so you can breathe normally  Ask your healthcare provider how often to do this  · Breathe in steam   Heat a bowl of water until you see steam  Lean over the bowl and make a tent over your head with a large towel  Breathe deeply for about 20 minutes  Be careful not to get too close to the steam or burn yourself  Do this 3 times a day  You can also breathe deeply when you take a hot shower  · Sleep with your head elevated  Place an extra pillow under your head before you go to sleep to help your sinuses drain  · Drink liquids as directed  Ask your healthcare provider how much liquid to drink each day and which liquids are best for you   Liquids will thin the mucus in your nose and help it drain  Avoid drinks that contain alcohol or caffeine  · Do not smoke, and avoid secondhand smoke  Nicotine and other chemicals in cigarettes and cigars can make your symptoms worse  Ask your healthcare provider for information if you currently smoke and need help to quit  E-cigarettes or smokeless tobacco still contain nicotine  Talk to your healthcare provider before you use these products  Prevent the spread of germs that cause sinusitis:  Wash your hands often with soap and water  Wash your hands after you use the bathroom, change a child's diaper, or sneeze  Wash your hands before you prepare or eat food  Follow up with your healthcare provider as directed: You may be referred to an ear, nose, and throat specialist  Write down your questions so you remember to ask them during your visits  © Copyright [address] Information is for End User's use only and may not be sold, redistributed or otherwise used for commercial purposes  All illustrations and images included in CareNotes® are the copyrighted property of A D A M [employer] Inc  or [facility] Tae Bowden   The above information is an  only  It is not intended as medical advice for individual conditions or treatments  Talk to your doctor, nurse or pharmacist before following any medical regimen to see if it is safe and effective for you

## 2021-05-19 NOTE — PROGRESS NOTES
[facility]        NAME: Tony Fajardo is a 37 y o  male  : 1980    MRN: [id number]  DATE: May 19, 2021  TIME: 1:01 PM    Assessment and Plan   Acute maxillary sinusitis, recurrence not specified [J01 00]  1  Acute maxillary sinusitis, recurrence not specified  amoxicillin-clavulanate (AUGMENTIN) 875-125 mg per tablet         Patient Instructions     Patient Instructions     Hydration and rest  Tylenol and motrin  Nasal saline spray  mucinex and flonase  Start augmentin  PCP follow up  Return to clinic with worsening symptoms  Sinusitis   WHAT YOU NEED TO KNOW:   Sinusitis is inflammation or infection of your sinuses  It is most often caused by a virus  Acute sinusitis may last up to 12 weeks  Chronic sinusitis lasts longer than 12 weeks  Recurrent sinusitis means you have 4 or more times in 1 year  DISCHARGE INSTRUCTIONS:   Return to the emergency department if:   · Your eye and eyelid are red, swollen, and painful  · You cannot open your eye  · You have vision changes, such as double vision  · Your eyeball bulges out or you cannot move your eye  · You are more sleepy than normal, or you notice changes in your ability to think, move, or talk  · You have a stiff neck, a fever, or a bad headache  · You have swelling of your forehead or scalp  Contact your healthcare provider if:   · Your symptoms do not improve after 3 days  · Your symptoms do not go away after 10 days  · You have nausea and are vomiting  · Your nose is bleeding  · You have questions or concerns about your condition or care  Medicines: Your symptoms may go away on their own  Your healthcare provider may recommend watchful waiting for up to 10 days before starting antibiotics  You may  need any of the following:  · Acetaminophen  decreases pain and fever  It is available without a doctor's order  Ask how much to take and how often to take it  Follow directions   Read the labels of all other medicines you are using to see if they also contain acetaminophen, or ask your doctor or pharmacist  Acetaminophen can cause liver damage if not taken correctly  Do not use more than 4 grams (4,000 milligrams) total of acetaminophen in one day  · NSAIDs , such as ibuprofen, help decrease swelling, pain, and fever  This medicine is available with or without a doctor's order  NSAIDs can cause stomach bleeding or kidney problems in certain people  If you take blood thinner medicine, always ask your healthcare provider if NSAIDs are safe for you  Always read the medicine label and follow directions  · Nasal steroid sprays  may help decrease inflammation in your nose and sinuses  · Decongestants  help reduce swelling and drain mucus in the nose and sinuses  They may help you breathe easier  · Antihistamines  help dry mucus in the nose and relieve sneezing  · Antibiotics  help treat or prevent a bacterial infection  · Take your medicine as directed  Contact your healthcare provider if you think your medicine is not helping or if you have side effects  Tell him or her if you are allergic to any medicine  Keep a list of the medicines, vitamins, and herbs you take  Include the amounts, and when and why you take them  Bring the list or the pill bottles to follow-up visits  Carry your medicine list with you in case of an emergency  Self-care:   · Rinse your sinuses  Use a sinus rinse device to rinse your nasal passages with a saline (salt water) solution or distilled water  Do not use tap water  This will help thin the mucus in your nose and rinse away pollen and dirt  It will also help reduce swelling so you can breathe normally  Ask your healthcare provider how often to do this  · Breathe in steam   Heat a bowl of water until you see steam  Lean over the bowl and make a tent over your head with a large towel  Breathe deeply for about 20 minutes   Be careful not to get too close to the steam or burn yourself  Do this 3 times a day  You can also breathe deeply when you take a hot shower  · Sleep with your head elevated  Place an extra pillow under your head before you go to sleep to help your sinuses drain  · Drink liquids as directed  Ask your healthcare provider how much liquid to drink each day and which liquids are best for you  Liquids will thin the mucus in your nose and help it drain  Avoid drinks that contain alcohol or caffeine  · Do not smoke, and avoid secondhand smoke  Nicotine and other chemicals in cigarettes and cigars can make your symptoms worse  Ask your healthcare provider for information if you currently smoke and need help to quit  E-cigarettes or smokeless tobacco still contain nicotine  Talk to your healthcare provider before you use these products  Prevent the spread of germs that cause sinusitis:  Wash your hands often with soap and water  Wash your hands after you use the bathroom, change a child's diaper, or sneeze  Wash your hands before you prepare or eat food  Follow up with your healthcare provider as directed: You may be referred to an ear, nose, and throat specialist  Write down your questions so you remember to ask them during your visits  © Copyright [address] [place] [address] Drive Information is for End User's use only and may not be sold, redistributed or otherwise used for commercial purposes  All illustrations and images included in CareNotes® are the copyrighted property of A D A M , Inc  or [facility] Tae Bowden   The above information is an  only  It is not intended as medical advice for individual conditions or treatments  Talk to your doctor, nurse or pharmacist before following any medical regimen to see if it is safe and effective for you  **Portions of the record may have been created with voice recognition software    Occasional wrong word or "sound a like" substitutions may have occurred due to the inherent limitations of voice recognition software  Read the chart carefully and recognize, using context, where substitutions have occurred  **     Chief Complaint     Chief Complaint   Patient presents with    Sinusitis     x 5 days, states he has R cheek and tooth pain from his sinus congestion as well         History of Present Illness     56-year-old male presents clinic with complaints of sinus pain and pressure x1 week  Reports history of seasonal allergies which she typically controls with over-the-counter Mucinex and Zyrtec  Reports for the past week he has had pain localized under his right thigh and into his right atrial   Reports history sinus infections that presented similarly  Denies any fever, loss of taste or smell, cough, shortness of breath  Review of Systems     Review of Systems   Constitutional: Negative for appetite change, chills and fever  HENT: Positive for ear pain, sinus pressure and sinus pain  Negative for congestion, ear discharge, facial swelling, mouth sores, postnasal drip and sore throat  Respiratory: Negative for cough and shortness of breath  Cardiovascular: Negative for chest pain  Gastrointestinal: Negative for diarrhea, nausea and vomiting  Musculoskeletal: Negative for myalgias  Skin: Negative for rash  Neurological: Negative for headaches           Current Medications     Current Outpatient Medications:     ALPRAZolam (XANAX) 0 5 mg tablet, TAKE ONE TABLET BY MOUTH TWICE A DAY AS NEEDED FOR ANXIETY, Disp: , Rfl:     Cholecalciferol (Vitamin D3) 50 MCG (2000 UT) capsule, Vitamin D3 50 mcg (2,000 unit) tablet, Disp: , Rfl:     sertraline (ZOLOFT) 100 mg tablet, TAKE ONE TABLET BY MOUTH EVERY DAY FOR MOOD, Disp: , Rfl:     amoxicillin-clavulanate (AUGMENTIN) 875-125 mg per tablet, Take 1 tablet by mouth every 12 (twelve) hours for 7 days, Disp: 14 tablet, Rfl: 0    cyclobenzaprine (FLEXERIL) 5 mg tablet, Take 5 mg by mouth 3 (three) times a day as needed for muscle spasms, Disp: , Rfl:     LIPITOR 80 MG tablet, , Disp: , Rfl:     loratadine (CLARITIN) 10 mg tablet, Allergy Relief (loratadine) 10 mg tablet, Disp: , Rfl:     losartan (COZAAR) 25 mg tablet, Take 25 mg by mouth, Disp: , Rfl:     losartan (COZAAR) 25 mg tablet, Take 1 tablet (25 mg total) by mouth daily (Patient not taking: Reported on 5/19/2021), Disp: 30 tablet, Rfl: 0    VITAMIN D3 SUPER STRENGTH 2000 units tablet, , Disp: , Rfl:     Current Allergies     Allergies as of 05/19/2021    (No Known Allergies)            The following portions of the patient's history were reviewed and updated as appropriate: allergies, current medications, past family history, past medical history, past social history, past surgical history and problem list      Past Medical History:   Diagnosis Date    Fractures     Head injury     Hyperlipidemia     Hypertension     PTSD (post-traumatic stress disorder)        Past Surgical History:   Procedure Laterality Date    ANKLE SURGERY      ARM DEBRIDEMENT      COLONOSCOPY      HIP SURGERY      KNEE SURGERY      SHOULDER SURGERY      WRIST SURGERY         Family History   Adopted: Yes         Medications have been verified  Objective     /92   Pulse 80   Temp 98 °F (36 7 °C) (Temporal)   Resp 16   Ht 5' 11" (1 803 m)   Wt 114 kg (252 lb)   SpO2 97%   BMI 35 15 kg/m²        Physical Exam     Physical Exam  Vitals signs and nursing note reviewed  Constitutional:       General: He is not in acute distress  Appearance: Normal appearance  He is not ill-appearing  HENT:      Head: Normocephalic and atraumatic  Right Ear: Tympanic membrane is erythematous and bulging  Left Ear: Tympanic membrane is not erythematous or bulging  Nose: Congestion present  No rhinorrhea  Right Sinus: Maxillary sinus tenderness present  No frontal sinus tenderness  Left Sinus: No maxillary sinus tenderness or frontal sinus tenderness  Cardiovascular:      Rate and Rhythm: Normal rate  Pulmonary:      Effort: Pulmonary effort is normal    Skin:     Findings: No rash  Neurological:      Mental Status: He is alert

## 2021-06-30 ENCOUNTER — OFFICE VISIT (OUTPATIENT)
Dept: URGENT CARE | Facility: CLINIC | Age: 41
End: 2021-06-30
Payer: MEDICARE

## 2021-06-30 VITALS
HEIGHT: 71 IN | SYSTOLIC BLOOD PRESSURE: 137 MMHG | WEIGHT: 250 LBS | DIASTOLIC BLOOD PRESSURE: 89 MMHG | BODY MASS INDEX: 35 KG/M2 | TEMPERATURE: 97.6 F | OXYGEN SATURATION: 97 % | HEART RATE: 78 BPM

## 2021-06-30 DIAGNOSIS — S61.219A LACERATION WITHOUT FOREIGN BODY OF UNSPECIFIED FINGER WITHOUT DAMAGE TO NAIL, INITIAL ENCOUNTER: Primary | ICD-10-CM

## 2021-06-30 PROCEDURE — 99213 OFFICE O/P EST LOW 20 MIN: CPT | Performed by: PHYSICIAN ASSISTANT

## 2021-06-30 PROCEDURE — G0463 HOSPITAL OUTPT CLINIC VISIT: HCPCS | Performed by: PHYSICIAN ASSISTANT

## 2021-06-30 PROCEDURE — 90715 TDAP VACCINE 7 YRS/> IM: CPT

## 2021-06-30 PROCEDURE — 90471 IMMUNIZATION ADMIN: CPT | Performed by: PHYSICIAN ASSISTANT

## 2021-06-30 PROCEDURE — 12001 RPR S/N/AX/GEN/TRNK 2.5CM/<: CPT | Performed by: PHYSICIAN ASSISTANT

## 2021-06-30 RX ORDER — CETIRIZINE HYDROCHLORIDE 10 MG/1
10 TABLET ORAL DAILY
COMMUNITY

## 2021-06-30 NOTE — PROGRESS NOTES
[facility]        NAME: Enrique Pisano is a 37 y o  male  : 1980    MRN: [id number]  DATE: 2021  TIME: 7:01 PM    Assessment and Plan   Laceration without foreign body of unspecified finger without damage to nail, initial encounter [S61 [id number]]  1  Laceration without foreign body of unspecified finger without damage to nail, initial encounter  Tdap Vaccine greater than or equal to 6yo         Patient Instructions   Patient Instructions   Stiches out in 7-10 days   If it looks infected go to the ER or come back here        Follow up with PCP in 3-5 days  Proceed to  ER if symptoms worsen  Chief Complaint     Chief Complaint   Patient presents with    Hand Pain     RIGHT INDEX LAC         History of Present Illness       The pt is a 28-year-old male presenting with a laceration to the right index finger  He cut it on a blade at home  The blade was clean  Review of Systems   Review of Systems   Constitutional: Negative for chills, fatigue and fever  Skin: Positive for wound (1 cm lac)  Negative for pallor and rash           Current Medications       Current Outpatient Medications:     ALPRAZolam (XANAX) 0 5 mg tablet, TAKE ONE TABLET BY MOUTH TWICE A DAY AS NEEDED FOR ANXIETY, Disp: , Rfl:     cetirizine (ZyrTEC) 10 mg tablet, Take 10 mg by mouth daily, Disp: , Rfl:     Cholecalciferol (Vitamin D3) 50 MCG (2000 UT) capsule, Vitamin D3 50 mcg (2,000 unit) tablet, Disp: , Rfl:     cyclobenzaprine (FLEXERIL) 5 mg tablet, Take 5 mg by mouth 3 (three) times a day as needed for muscle spasms, Disp: , Rfl:     sertraline (ZOLOFT) 100 mg tablet, TAKE ONE TABLET BY MOUTH EVERY DAY FOR MOOD, Disp: , Rfl:     LIPITOR 80 MG tablet, , Disp: , Rfl:     loratadine (CLARITIN) 10 mg tablet, Allergy Relief (loratadine) 10 mg tablet (Patient not taking: Reported on 2021), Disp: , Rfl:     losartan (COZAAR) 25 mg tablet, Take 25 mg by mouth (Patient not taking: Reported on 2021), Disp: , Rfl:     losartan (COZAAR) 25 mg tablet, Take 1 tablet (25 mg total) by mouth daily (Patient not taking: Reported on 5/19/2021), Disp: 30 tablet, Rfl: 0    VITAMIN D3 SUPER STRENGTH 2000 units tablet, , Disp: , Rfl:     Current Allergies     Allergies as of 06/30/2021    (No Known Allergies)            The following portions of the patient's history were reviewed and updated as appropriate: allergies, current medications, past family history, past medical history, past social history, past surgical history and problem list      Past Medical History:   Diagnosis Date    Fractures     Head injury     Hyperlipidemia     Hypertension     PTSD (post-traumatic stress disorder)        Past Surgical History:   Procedure Laterality Date    ANKLE SURGERY      ARM DEBRIDEMENT      COLONOSCOPY      HIP SURGERY      KNEE SURGERY      SHOULDER SURGERY      WRIST SURGERY         Family History   Adopted: Yes         Medications have been verified  Objective   /89   Pulse 78   Temp 97 6 °F (36 4 °C)   Ht 5' 11" (1 803 m)   Wt 113 kg (250 lb)   SpO2 97%   BMI 34 87 kg/m²          Physical Exam     Physical Exam  Constitutional:       General: He is not in acute distress  Appearance: Normal appearance  He is normal weight  He is not ill-appearing, toxic-appearing or diaphoretic  HENT:      Head: Normocephalic and atraumatic  Skin:     General: Skin is warm and dry  Capillary Refill: Capillary refill takes less than 2 seconds  Comments: 1 cm laceration to right index finger  The wound is clean    Neurological:      Mental Status: He is alert  Laceration repair    Date/Time: 6/30/2021 6:59 PM  Performed by: Jeremy Allen PA-C  Authorized by: Jeremy Allen PA-C   Consent: Verbal consent obtained    Risks and benefits: risks, benefits and alternatives were discussed  Consent given by: patient  Patient understanding: patient states understanding of the procedure being performed  Patient consent: the patient's understanding of the procedure matches consent given  Procedure consent: procedure consent matches procedure scheduled  Patient identity confirmed: verbally with patient  Body area: upper extremity  Location details: right index finger  Laceration length: 1 cm  Foreign bodies: no foreign bodies  Tendon involvement: none  Nerve involvement: none  Vascular damage: no    Anesthesia:  Local Anesthetic: lidocaine 2% without epinephrine    Sedation:  Patient sedated: no      Wound Dehiscence:  Superficial Wound Dehiscence: simple closure      Procedure Details:  Skin closure: 5-0 nylon  Number of sutures: 3  Dressing: 4x4 sterile gauze, pressure dressing and non-adhesive packing strip  Comments:  Tolerated well   Wound approximated

## 2021-10-07 ENCOUNTER — TELEPHONE (OUTPATIENT)
Dept: PAIN MEDICINE | Facility: CLINIC | Age: 41
End: 2021-10-07

## 2021-10-11 ENCOUNTER — TELEPHONE (OUTPATIENT)
Dept: RADIOLOGY | Facility: CLINIC | Age: 41
End: 2021-10-11

## 2021-10-11 ENCOUNTER — OFFICE VISIT (OUTPATIENT)
Dept: PAIN MEDICINE | Facility: CLINIC | Age: 41
End: 2021-10-11
Payer: MEDICARE

## 2021-10-11 VITALS
WEIGHT: 259 LBS | HEIGHT: 71 IN | HEART RATE: 89 BPM | SYSTOLIC BLOOD PRESSURE: 135 MMHG | BODY MASS INDEX: 36.26 KG/M2 | DIASTOLIC BLOOD PRESSURE: 84 MMHG

## 2021-10-11 DIAGNOSIS — M79.18 MYOFASCIAL PAIN SYNDROME: ICD-10-CM

## 2021-10-11 DIAGNOSIS — M54.2 CHRONIC NECK PAIN: ICD-10-CM

## 2021-10-11 DIAGNOSIS — G89.29 CHRONIC NECK PAIN: ICD-10-CM

## 2021-10-11 DIAGNOSIS — M48.02 CERVICAL SPINAL STENOSIS: Primary | ICD-10-CM

## 2021-10-11 PROCEDURE — 99214 OFFICE O/P EST MOD 30 MIN: CPT | Performed by: STUDENT IN AN ORGANIZED HEALTH CARE EDUCATION/TRAINING PROGRAM

## 2021-10-11 RX ORDER — TRAMADOL HYDROCHLORIDE 50 MG/1
50 TABLET ORAL EVERY 8 HOURS PRN
Qty: 21 TABLET | Refills: 0 | Status: SHIPPED | OUTPATIENT
Start: 2021-10-11 | End: 2022-05-09 | Stop reason: ALTCHOICE

## 2021-10-13 ENCOUNTER — PROCEDURE VISIT (OUTPATIENT)
Dept: PAIN MEDICINE | Facility: CLINIC | Age: 41
End: 2021-10-13
Payer: MEDICARE

## 2021-10-13 DIAGNOSIS — M79.18 MYOFASCIAL PAIN SYNDROME: Primary | ICD-10-CM

## 2021-10-13 PROCEDURE — 76942 ECHO GUIDE FOR BIOPSY: CPT | Performed by: STUDENT IN AN ORGANIZED HEALTH CARE EDUCATION/TRAINING PROGRAM

## 2021-10-13 PROCEDURE — 20552 NJX 1/MLT TRIGGER POINT 1/2: CPT | Performed by: STUDENT IN AN ORGANIZED HEALTH CARE EDUCATION/TRAINING PROGRAM

## 2021-10-13 RX ORDER — METHYLPREDNISOLONE ACETATE 40 MG/ML
40 INJECTION, SUSPENSION INTRA-ARTICULAR; INTRALESIONAL; INTRAMUSCULAR; SOFT TISSUE ONCE
Status: COMPLETED | OUTPATIENT
Start: 2021-10-13 | End: 2021-10-18

## 2021-10-13 RX ORDER — BUPIVACAINE HYDROCHLORIDE 2.5 MG/ML
7 INJECTION, SOLUTION EPIDURAL; INFILTRATION; INTRACAUDAL ONCE
Status: COMPLETED | OUTPATIENT
Start: 2021-10-13 | End: 2021-10-18

## 2021-10-18 RX ADMIN — BUPIVACAINE HYDROCHLORIDE 7 ML: 2.5 INJECTION, SOLUTION EPIDURAL; INFILTRATION; INTRACAUDAL at 09:16

## 2021-10-18 RX ADMIN — METHYLPREDNISOLONE ACETATE 40 MG: 40 INJECTION, SUSPENSION INTRA-ARTICULAR; INTRALESIONAL; INTRAMUSCULAR; SOFT TISSUE at 09:17

## 2022-04-13 ENCOUNTER — TELEPHONE (OUTPATIENT)
Dept: PAIN MEDICINE | Facility: CLINIC | Age: 42
End: 2022-04-13

## 2022-04-13 NOTE — TELEPHONE ENCOUNTER
S/w pt. Pt would like to repeat TPI done 10/13/21. States pain is on both right and left side and injection helped a lot last time but did not take pain away fully, like in past TPIs. Please advise if OK to schedule repeat TPI?

## 2022-05-09 ENCOUNTER — PROCEDURE VISIT (OUTPATIENT)
Dept: PAIN MEDICINE | Facility: CLINIC | Age: 42
End: 2022-05-09
Payer: MEDICARE

## 2022-05-09 VITALS
HEIGHT: 71 IN | BODY MASS INDEX: 35.53 KG/M2 | RESPIRATION RATE: 18 BRPM | DIASTOLIC BLOOD PRESSURE: 81 MMHG | HEART RATE: 69 BPM | SYSTOLIC BLOOD PRESSURE: 123 MMHG | WEIGHT: 253.8 LBS

## 2022-05-09 DIAGNOSIS — M79.18 MYOFASCIAL PAIN SYNDROME: Primary | ICD-10-CM

## 2022-05-09 PROCEDURE — 76942 ECHO GUIDE FOR BIOPSY: CPT | Performed by: STUDENT IN AN ORGANIZED HEALTH CARE EDUCATION/TRAINING PROGRAM

## 2022-05-09 PROCEDURE — 20552 NJX 1/MLT TRIGGER POINT 1/2: CPT | Performed by: STUDENT IN AN ORGANIZED HEALTH CARE EDUCATION/TRAINING PROGRAM

## 2022-05-09 RX ORDER — BUPIVACAINE HYDROCHLORIDE 2.5 MG/ML
4 INJECTION, SOLUTION EPIDURAL; INFILTRATION; INTRACAUDAL ONCE
Status: CANCELLED | OUTPATIENT
Start: 2022-05-09 | End: 2022-05-09

## 2022-05-09 RX ORDER — FAMOTIDINE 20 MG/1
TABLET, FILM COATED ORAL
COMMUNITY
Start: 2022-02-25 | End: 2022-05-26

## 2022-05-09 RX ORDER — SILDENAFIL 100 MG/1
TABLET, FILM COATED ORAL
COMMUNITY
Start: 2022-04-18

## 2022-05-09 RX ORDER — BUPIVACAINE HYDROCHLORIDE 2.5 MG/ML
3 INJECTION, SOLUTION EPIDURAL; INFILTRATION; INTRACAUDAL ONCE
Status: COMPLETED | OUTPATIENT
Start: 2022-05-09 | End: 2022-05-09

## 2022-05-09 RX ORDER — METHYLPREDNISOLONE ACETATE 40 MG/ML
40 INJECTION, SUSPENSION INTRA-ARTICULAR; INTRALESIONAL; INTRAMUSCULAR; SOFT TISSUE ONCE
Status: COMPLETED | OUTPATIENT
Start: 2022-05-09 | End: 2022-05-09

## 2022-05-09 RX ORDER — METHYLPREDNISOLONE ACETATE 40 MG/ML
40 INJECTION, SUSPENSION INTRA-ARTICULAR; INTRALESIONAL; INTRAMUSCULAR; SOFT TISSUE ONCE
Status: CANCELLED | OUTPATIENT
Start: 2022-05-09 | End: 2022-05-09

## 2022-05-09 RX ADMIN — BUPIVACAINE HYDROCHLORIDE 3 ML: 2.5 INJECTION, SOLUTION EPIDURAL; INFILTRATION; INTRACAUDAL at 12:43

## 2022-05-09 RX ADMIN — METHYLPREDNISOLONE ACETATE 40 MG: 40 INJECTION, SUSPENSION INTRA-ARTICULAR; INTRALESIONAL; INTRAMUSCULAR; SOFT TISSUE at 12:44

## 2022-05-09 NOTE — PROGRESS NOTES
Universal Protocol:  Consent: Verbal consent obtained  Written consent obtained  Risks and benefits: risks, benefits and alternatives were discussed  Consent given by: patient  Time out: Immediately prior to procedure a "time out" was called to verify the correct patient, procedure, equipment, support staff and site/side marked as required  Timeout called at: 5/9/2022 9:35 AM   Patient understanding: patient states understanding of the procedure being performed  Patient consent: the patient's understanding of the procedure matches consent given  Procedure consent: procedure consent matches procedure scheduled  Relevant documents: relevant documents present and verified  Test results: test results available and properly labeled  Site marked: the operative site was marked  Radiology Images displayed and confirmed  If images not available, report reviewed: imaging studies available  Required items: required blood products, implants, devices, and special equipment available  Patient identity confirmed: verbally with patient    Supporting Documentation  Indications: pain    Injection site identified by: ultrasound    Procedure Details  Location(s):    Neck/Upper Back: R upper trapezius     Middle Back: R latissimus dorsi   Additional procedure details: PROCEDURE:  Trigger point injection x 4 with local anesthetic and steroid in the right trapezius and right latissimus muscle groups under ultrasound guidance  ATTENDING PHYSICIAN:  ALEXY Auguste  PREPROCEDURE DIAGNOSIS:  Myofascial pain with identifiable trigger points  POSTPROCEDURE DIAGNOSIS:  Myofascial pain with identifiable trigger points  ANESTHESIA:  Local    ESTIMATED BLOOD LOSS:  Minimal    COMPLICATIONS: None     CONSENT:  Today's procedure, its potential benefits as well as its risks and potential side effects were reviewed    Discussed risks of the procedure include bleeding, infection, nerve irritation or damage, reactions to the medications, failure of the pain to improve and exacerbation of the pain were explained to the patient, who verbalized understanding and who wished to proceed  Informed consent was signed  DESCRIPTION OF THE PROCEDURE:  After informed consent was obtained, the patient was placed in the seated position  4 trigger points were identified via palpation and marked with a surgical skin marker  The skin was prepped with antiseptic in the usual sterile fashion  Strict aseptic technique was utilized throughout the procedure  Using ultrasound guidance a 25 gauge, 2inch needle was then advanced into each identified trigger point  Care was taken to visualize the entirety of the needle throughout the injection  An injectate consisting of 3 ml of 0 25% marcaine with 1 mL of 40mg/mL depo-medrol was slowly injected in divided doses after negative aspiration  The needle was removed with tip intact  The patient tolerated the procedure and hemostasis was maintained  There were no apparent paresthesias or complications  The skin was wiped clean, and a band-aid was placed as appropriate  The patient was monitored for an appropriate period of time following the procedure and remained hemodynamically stable and neurovascularly intact  The patient was ultimately discharged to home with supervision in good condition and instructed to call the office to report the response

## 2022-05-16 ENCOUNTER — TELEPHONE (OUTPATIENT)
Dept: PAIN MEDICINE | Facility: CLINIC | Age: 42
End: 2022-05-16

## 2022-05-16 NOTE — TELEPHONE ENCOUNTER
Pt returning will the 0 % of improvement and pain level 7/10  Please be advised thank you    Pt can be contacted @ [phone]

## 2022-06-06 NOTE — TELEPHONE ENCOUNTER
Patient is a little upset, we never notified him to schedule appt he is in pain, I tried scheduling office visit but states it needs referral but patient says he will be using his medicare not his [facility] info       Please advise    Thank you

## 2022-06-07 NOTE — TELEPHONE ENCOUNTER
S/w pt and apologized that [facility] has not reached out to him  Per task below, pt was to be called and scheduled for an ov for reeval but pt was never called  Pt states pain has been severe and is primarily in right neck radiating down right arm with numbness, tingling and soreness  Pt states TPI helped for a few days  Pt alternating tylenol and ibuprofen  Advised pt SP out of office til Thursday and will call pt with next step at that time  Pt is asking for imaging or a sooner ov for reeval    SP please advise on next step or if pt should be double booked somewhere  Next available ov is 3 weeks out on 6/27 and pt was never contacted in error  Pt would also like his chart corrected  Pt states medicare is his primary insurance   Will forward to clerical to update

## 2022-06-07 NOTE — TELEPHONE ENCOUNTER
Pt called in he is very upset because no one is assisting him with his pain  Pt stated that he does not need a referral for an OVS because he has Medicare and will be using this for his visit  Please be advised thank you    Pt can be contacted @ [phone]

## 2022-06-09 NOTE — PROGRESS NOTES
Pain Medicine Follow-Up Note    Assessment:  No diagnosis found  Plan:  No orders of the defined types were placed in this encounter  No orders of the defined types were placed in this encounter  My impressions and treatment recommendations were discussed in detail with the patient who verbalized understanding and had no further questions  ***    {PDMP Statement:51309}    {UDS Statement:69706}    {Opioid Statement:49813}    {Pain Management Procedure Risk Statement:13263}    {Oral Swab Statement:42858}    Follow-up is planned in *** time or sooner as warranted  Discharge instructions were provided  I personally saw and examined the patient and I agree with the above discussed plan of care  History of Present Illness:    Deedee Sparks is a 40 y o  male who presents to [facility] for interval re-evaluation of the above stated pain complaints  The patient has a past medical and chronic pain history as outlined in the assessment section  {He/she (caps):75379} was last seen on ***     ***    Pain Contract Signed:  ***  Last Urine Drug Screen:  ***    Other than as stated above, the patient denies any interval changes in medications, medical condition, mental condition, symptoms, or allergies since the last office visit           Review of Systems:    Review of Systems      Patient Active Problem List   Diagnosis    Right hip impingement syndrome    Degenerative tear of acetabular labrum of right hip       Past Medical History:   Diagnosis Date    Fractures     Head injury     Hyperlipidemia     Hypertension     PTSD (post-traumatic stress disorder)        Past Surgical History:   Procedure Laterality Date    ANKLE SURGERY      ARM DEBRIDEMENT      COLONOSCOPY      HIP SURGERY      KNEE SURGERY      SHOULDER SURGERY      WRIST SURGERY         Family History   Adopted: Yes       Social History     Occupational History    Not on file   Tobacco Use    Smoking status: Former Smoker    Smokeless tobacco: Former User   Vaping Use    Vaping Use: Never used   Substance and Sexual Activity    Alcohol use: Not Currently    Drug use: No    Sexual activity: Not on file         Current Outpatient Medications:     ALPRAZolam (XANAX) 0 5 mg tablet, TAKE ONE TABLET BY MOUTH TWICE A DAY AS NEEDED FOR ANXIETY, Disp: , Rfl:     cetirizine (ZyrTEC) 10 mg tablet, Take 10 mg by mouth daily, Disp: , Rfl:     Cholecalciferol (Vitamin D3) 50 MCG (2000 UT) capsule, Vitamin D3 50 mcg (2,000 unit) tablet, Disp: , Rfl:     cyclobenzaprine (FLEXERIL) 5 mg tablet, Take 5 mg by mouth 3 (three) times a day as needed for muscle spasms, Disp: , Rfl:     famotidine (PEPCID) 20 mg tablet, TAKE ONE TABLET BY MOUTH EVERY DAY AS NEEDED FOR GASTRIC REFLUX, Disp: , Rfl:     loratadine (CLARITIN) 10 mg tablet, Allergy Relief (loratadine) 10 mg tablet (Patient not taking: Reported on 2021), Disp: , Rfl:     sertraline (ZOLOFT) 100 mg tablet, TAKE ONE TABLET BY MOUTH EVERY DAY FOR MOOD, Disp: , Rfl:     sildenafil (VIAGRA) 100 mg tablet, , Disp: , Rfl:     VITAMIN D3 SUPER STRENGTH 2000 units tablet,  , Disp: , Rfl:     No Known Allergies    Physical Exam:    There were no vitals taken for this visit  Constitutional:{General Appearance:52200::"normal, well developed, well nourished, alert, in no distress and non-toxic and no overt pain behavior  "}  Eyes:{Sclera:01891::"anicteric"}  HEENT:{Hearin::"grossly intact"}  Neck:{Neck:31300::"supple, symmetric, trachea midline and no masses "}  Pulmonary:{Respiratory effort:70748::"even and unlabored"}  Cardiovascular:{Examination of Extremities:62015::"No edema or pitting edema present"}  Skin:{Skin and Subcutaneous tissues:90944::"Normal without rashes or lesions and well hydrated"}  Psychiatric:{Mood and Affect:16176::"Mood and affect appropriate"}  Neurologic:{Cranial Nerves:18174::"Cranial Nerves II-XII grossly intact"}  Musculoskeletal:{[place]:58431::"normal"}      Imaging  No orders to display         No orders of the defined types were placed in this encounter

## 2022-06-14 ENCOUNTER — OFFICE VISIT (OUTPATIENT)
Dept: PAIN MEDICINE | Facility: CLINIC | Age: 42
End: 2022-06-14

## 2022-06-14 VITALS
SYSTOLIC BLOOD PRESSURE: 128 MMHG | HEART RATE: 70 BPM | HEIGHT: 71 IN | BODY MASS INDEX: 34.72 KG/M2 | WEIGHT: 248 LBS | DIASTOLIC BLOOD PRESSURE: 87 MMHG

## 2022-06-14 DIAGNOSIS — M48.02 CERVICAL SPINAL STENOSIS: ICD-10-CM

## 2022-06-14 DIAGNOSIS — M79.18 MYOFASCIAL PAIN SYNDROME: ICD-10-CM

## 2022-06-14 DIAGNOSIS — M54.12 CERVICAL RADICULOPATHY: Primary | ICD-10-CM

## 2022-06-14 PROCEDURE — 99214 OFFICE O/P EST MOD 30 MIN: CPT | Performed by: STUDENT IN AN ORGANIZED HEALTH CARE EDUCATION/TRAINING PROGRAM

## 2022-06-14 NOTE — PROGRESS NOTES
Pain Medicine Follow-Up Note    Assessment:  1  Cervical radiculopathy    2  Myofascial pain syndrome    3  Cervical spinal stenosis        Plan:  Orders Placed This Encounter   Procedures    [place] spine and pain procedure     Standing Status:   Future     Standing Expiration Date:   6/14/2026     Scheduling Instructions:      Please schedule asap     Order Specific Question:   Reason for Exam:     Answer:   right C7-T1 DAMI     Order Specific Question:   Anticoagulant hold needed? Answer:   no       No orders of the defined types were placed in this encounter  My impressions and treatment recommendations were discussed in detail with the patient who verbalized understanding and had no further questions  This is a 56-year-old male who returns to the office following the trigger point injection with no relief of his symptoms  Today's complaining of significant right upper extremity pain down the right triceps and into the right pinky and ring finger  These signs and symptoms of cervical radiculopathy  We discussed C7-T1 cervical epidural steroid injection under fluoroscopic guidance to help with the inflammatory component of his pain  If no significant relief with the injection, may consider getting updated imaging  Patient reports no significant relief with physical therapy in the past   Declining return to physical therapy for traction at this time  [place] Prescription Drug Monitoring Program report was reviewed and was appropriate     Complete risks and benefits including bleeding, infection, tissue reaction, nerve injury and allergic reaction were discussed  The approach was demonstrated using models and literature was provided  Verbal and written consent was obtained  Discharge instructions were provided  I personally saw and examined the patient and I agree with the above discussed plan of care      History of Present Illness:    Ora Bro is a 40 y o  male who presents to [facility] for interval re-evaluation of the above stated pain complaints  The patient has a past medical and chronic pain history as outlined in the assessment section  He was last seen on 05/10/2022 for trigger point injection with no relief of his symptoms  He returns today with same pain along with right upper extremity pain       Today he is reporting significant tightness in the right trap and radiating down the arm into the right pinky and ring finger  Feels it is nerve type pain  Denies significant decrement in strength  Denies bowel or bladder incontinence or saddle anesthesia    Other than as stated above, the patient denies any interval changes in medications, medical condition, mental condition, symptoms, or allergies since the last office visit  Review of Systems:    Review of Systems   Respiratory: Negative for shortness of breath  Cardiovascular: Negative for chest pain  Gastrointestinal: Positive for nausea  Negative for constipation, diarrhea and vomiting  Musculoskeletal: Positive for myalgias  Negative for arthralgias, gait problem and joint swelling  Paralysis or Muscle weakness  Pain in right arm   Skin: Negative for rash  Neurological: Negative for dizziness, seizures and weakness  All other systems reviewed and are negative          Patient Active Problem List   Diagnosis    Right hip impingement syndrome    Degenerative tear of acetabular labrum of right hip       Past Medical History:   Diagnosis Date    Fractures     Head injury     Hyperlipidemia     Hypertension     PTSD (post-traumatic stress disorder)        Past Surgical History:   Procedure Laterality Date    ANKLE SURGERY      ARM DEBRIDEMENT      COLONOSCOPY      HIP SURGERY      KNEE SURGERY      SHOULDER SURGERY      WRIST SURGERY         Family History   Adopted: Yes       Social History     Occupational History    Not on file   Tobacco Use    Smoking status: Former Smoker    Smokeless tobacco: Former User   Vaping Use    Vaping Use: Never used   Substance and Sexual Activity    Alcohol use: Not Currently    Drug use: No    Sexual activity: Not on file         Current Outpatient Medications:     cetirizine (ZyrTEC) 10 mg tablet, Take 10 mg by mouth daily, Disp: , Rfl:     Cholecalciferol (Vitamin D3) 50 MCG (2000 UT) capsule, Vitamin D3 50 mcg (2,000 unit) tablet, Disp: , Rfl:     cyclobenzaprine (FLEXERIL) 5 mg tablet, Take 5 mg by mouth 3 (three) times a day as needed for muscle spasms, Disp: , Rfl:     loratadine (CLARITIN) 10 mg tablet, Allergy Relief (loratadine) 10 mg tablet, Disp: , Rfl:     sertraline (ZOLOFT) 100 mg tablet, TAKE ONE TABLET BY MOUTH EVERY DAY FOR MOOD, Disp: , Rfl:     sildenafil (VIAGRA) 100 mg tablet, , Disp: , Rfl:     VITAMIN D3 SUPER STRENGTH 2000 units tablet,  , Disp: , Rfl:     ALPRAZolam (XANAX) 0 5 mg tablet, TAKE ONE TABLET BY MOUTH TWICE A DAY AS NEEDED FOR ANXIETY, Disp: , Rfl:     famotidine (PEPCID) 20 mg tablet, TAKE ONE TABLET BY MOUTH EVERY DAY AS NEEDED FOR GASTRIC REFLUX, Disp: , Rfl:     No Known Allergies    Physical Exam:    /87 (BP Location: Left arm, Patient Position: Sitting, Cuff Size: Standard)   Pulse 70   Ht 5' 11" (1 803 m)   Wt 112 kg (248 lb)   BMI 34 59 kg/m²     Constitutional:normal, well developed, well nourished, alert, in no distress and non-toxic and no overt pain behavior    Eyes:anicteric  HEENT:grossly intact  Neck:supple, symmetric, trachea midline and no masses   Pulmonary:even and unlabored  Cardiovascular:No edema or pitting edema present  Skin:Normal without rashes or lesions and well hydrated  Psychiatric:Mood and affect appropriate  Neurologic:Cranial Nerves II-XII grossly intact  Musculoskeletal:normal     Cervical Spine Exam    Sensory:  no sensory deficits noted  Motor Strength:  Left Arm Flexion  5/5  Left Arm Extension  5/5  Right Arm Flexion  5/5  Right Arm Extension 5/5  Left Wrist Flexion  5/5  Left Wrist Extension  5/5  Left Finger Abduction  5/5  Right Finger Abduction  5/5  Left Pincer Grasp  5/5  Right Pincer Grasp  5/5  Left    5/5  Right   5/5          Imaging  FL spine and pain procedure    (Results Pending)         Orders Placed This Encounter   Procedures    [place] spine and pain procedure

## 2022-06-14 NOTE — H&P (VIEW-ONLY)
Pain Medicine Follow-Up Note    Assessment:  1  Cervical radiculopathy    2  Myofascial pain syndrome    3  Cervical spinal stenosis        Plan:  Orders Placed This Encounter   Procedures    FL spine and pain procedure     Standing Status:   Future     Standing Expiration Date:   6/14/2026     Scheduling Instructions:      Please schedule asap     Order Specific Question:   Reason for Exam:     Answer:   right C7-T1 DAMI     Order Specific Question:   Anticoagulant hold needed? Answer:   no       No orders of the defined types were placed in this encounter  My impressions and treatment recommendations were discussed in detail with the patient who verbalized understanding and had no further questions  This is a 50-year-old male who returns to the office following the trigger point injection with no relief of his symptoms  Today's complaining of significant right upper extremity pain down the right triceps and into the right pinky and ring finger  These signs and symptoms of cervical radiculopathy  We discussed C7-T1 cervical epidural steroid injection under fluoroscopic guidance to help with the inflammatory component of his pain  If no significant relief with the injection, may consider getting updated imaging  Patient reports no significant relief with physical therapy in the past   Declining return to physical therapy for traction at this time  [place] Prescription Drug Monitoring Program report was reviewed and was appropriate     Complete risks and benefits including bleeding, infection, tissue reaction, nerve injury and allergic reaction were discussed  The approach was demonstrated using models and literature was provided  Verbal and written consent was obtained  Discharge instructions were provided  I personally saw and examined the patient and I agree with the above discussed plan of care      History of Present Illness:    Nahid Phillips is a 40 y o  male who presents to [facility] for interval re-evaluation of the above stated pain complaints  The patient has a past medical and chronic pain history as outlined in the assessment section  He was last seen on 05/10/2022 for trigger point injection with no relief of his symptoms  He returns today with same pain along with right upper extremity pain       Today he is reporting significant tightness in the right trap and radiating down the arm into the right pinky and ring finger  Feels it is nerve type pain  Denies significant decrement in strength  Denies bowel or bladder incontinence or saddle anesthesia    Other than as stated above, the patient denies any interval changes in medications, medical condition, mental condition, symptoms, or allergies since the last office visit  Review of Systems:    Review of Systems   Respiratory: Negative for shortness of breath  Cardiovascular: Negative for chest pain  Gastrointestinal: Positive for nausea  Negative for constipation, diarrhea and vomiting  Musculoskeletal: Positive for myalgias  Negative for arthralgias, gait problem and joint swelling  Paralysis or Muscle weakness  Pain in right arm   Skin: Negative for rash  Neurological: Negative for dizziness, seizures and weakness  All other systems reviewed and are negative          Patient Active Problem List   Diagnosis    Right hip impingement syndrome    Degenerative tear of acetabular labrum of right hip       Past Medical History:   Diagnosis Date    Fractures     Head injury     Hyperlipidemia     Hypertension     PTSD (post-traumatic stress disorder)        Past Surgical History:   Procedure Laterality Date    ANKLE SURGERY      ARM DEBRIDEMENT      COLONOSCOPY      HIP SURGERY      KNEE SURGERY      SHOULDER SURGERY      WRIST SURGERY         Family History   Adopted: Yes       Social History     Occupational History    Not on file   Tobacco Use    Smoking status: Former Smoker    Smokeless tobacco: Former User   Vaping Use    Vaping Use: Never used   Substance and Sexual Activity    Alcohol use: Not Currently    Drug use: No    Sexual activity: Not on file         Current Outpatient Medications:     cetirizine (ZyrTEC) 10 mg tablet, Take 10 mg by mouth daily, Disp: , Rfl:     Cholecalciferol (Vitamin D3) 50 MCG (2000 UT) capsule, Vitamin D3 50 mcg (2,000 unit) tablet, Disp: , Rfl:     cyclobenzaprine (FLEXERIL) 5 mg tablet, Take 5 mg by mouth 3 (three) times a day as needed for muscle spasms, Disp: , Rfl:     loratadine (CLARITIN) 10 mg tablet, Allergy Relief (loratadine) 10 mg tablet, Disp: , Rfl:     sertraline (ZOLOFT) 100 mg tablet, TAKE ONE TABLET BY MOUTH EVERY DAY FOR MOOD, Disp: , Rfl:     sildenafil (VIAGRA) 100 mg tablet, , Disp: , Rfl:     VITAMIN D3 SUPER STRENGTH 2000 units tablet,  , Disp: , Rfl:     ALPRAZolam (XANAX) 0 5 mg tablet, TAKE ONE TABLET BY MOUTH TWICE A DAY AS NEEDED FOR ANXIETY, Disp: , Rfl:     famotidine (PEPCID) 20 mg tablet, TAKE ONE TABLET BY MOUTH EVERY DAY AS NEEDED FOR GASTRIC REFLUX, Disp: , Rfl:     No Known Allergies    Physical Exam:    /87 (BP Location: Left arm, Patient Position: Sitting, Cuff Size: Standard)   Pulse 70   Ht 5' 11" (1 803 m)   Wt 112 kg (248 lb)   BMI 34 59 kg/m²     Constitutional:normal, well developed, well nourished, alert, in no distress and non-toxic and no overt pain behavior    Eyes:anicteric  HEENT:grossly intact  Neck:supple, symmetric, trachea midline and no masses   Pulmonary:even and unlabored  Cardiovascular:No edema or pitting edema present  Skin:Normal without rashes or lesions and well hydrated  Psychiatric:Mood and affect appropriate  Neurologic:Cranial Nerves II-XII grossly intact  Musculoskeletal:normal     Cervical Spine Exam    Sensory:  no sensory deficits noted  Motor Strength:  Left Arm Flexion  5/5  Left Arm Extension  5/5  Right Arm Flexion  5/5  Right Arm Extension 5/5  Left Wrist Flexion  5/5  Left Wrist Extension  5/5  Left Finger Abduction  5/5  Right Finger Abduction  5/5  Left Pincer Grasp  5/5  Right Pincer Grasp  5/5  Left    5/5  Right   5/5          Imaging  FL spine and pain procedure    (Results Pending)         Orders Placed This Encounter   Procedures    FL spine and pain procedure

## 2022-06-15 DIAGNOSIS — M54.12 CERVICAL RADICULOPATHY: Primary | ICD-10-CM

## 2022-06-15 DIAGNOSIS — G89.4 CHRONIC PAIN SYNDROME: ICD-10-CM

## 2022-06-15 RX ORDER — TRAMADOL HYDROCHLORIDE 50 MG/1
50 TABLET ORAL EVERY 8 HOURS PRN
Qty: 15 TABLET | Refills: 0 | Status: SHIPPED | OUTPATIENT
Start: 2022-06-15 | End: 2023-01-11

## 2022-06-16 ENCOUNTER — HOSPITAL ENCOUNTER (OUTPATIENT)
Dept: RADIOLOGY | Facility: CLINIC | Age: 42
Discharge: HOME | End: 2022-06-16
Admitting: STUDENT IN AN ORGANIZED HEALTH CARE EDUCATION/TRAINING PROGRAM
Payer: MEDICARE

## 2022-06-16 VITALS
OXYGEN SATURATION: 98 % | HEART RATE: 68 BPM | RESPIRATION RATE: 20 BRPM | SYSTOLIC BLOOD PRESSURE: 130 MMHG | TEMPERATURE: 97.4 F | DIASTOLIC BLOOD PRESSURE: 78 MMHG

## 2022-06-16 DIAGNOSIS — M54.12 CERVICAL RADICULOPATHY: ICD-10-CM

## 2022-06-16 PROCEDURE — 62321 NJX INTERLAMINAR CRV/THRC: CPT | Performed by: STUDENT IN AN ORGANIZED HEALTH CARE EDUCATION/TRAINING PROGRAM

## 2022-06-16 RX ORDER — METHYLPREDNISOLONE ACETATE 80 MG/ML
80 INJECTION, SUSPENSION INTRA-ARTICULAR; INTRALESIONAL; INTRAMUSCULAR; PARENTERAL; SOFT TISSUE ONCE
Status: COMPLETED | OUTPATIENT
Start: 2022-06-16 | End: 2022-06-16

## 2022-06-16 RX ADMIN — METHYLPREDNISOLONE ACETATE 80 MG: 80 INJECTION, SUSPENSION INTRA-ARTICULAR; INTRALESIONAL; INTRAMUSCULAR; PARENTERAL; SOFT TISSUE at 13:18

## 2022-06-16 NOTE — DISCHARGE INSTR - LAB
Epidural Steroid Injection   WHAT YOU NEED TO KNOW:   An epidural steroid injection (OLE) is a procedure to inject steroid medicine into the epidural space  The epidural space is between your spinal cord and vertebrae  Steroids reduce inflammation and fluid buildup in your spine that may be causing pain  You may be given pain medicine along with the steroids  ACTIVITY  Do not drive or operate machinery today  No strenuous activity today - bending, lifting, etc   You may resume normal activites starting tomorrow - start slowly and as tolerated  You may shower today, but no tub baths or hot tubs  You may have numbness for several hours from the local anesthetic  Please use caution and common sense, especially with weight-bearing activities  CARE OF THE INJECTION SITE  If you have soreness or pain, apply ice to the area today (20 minutes on/20 minutes off)  Starting tomorrow, you may use warm, moist heat or ice if needed  You may have an increase or change in your discomfort for 36-48 hours after your treatment  Apply ice and continue with any pain medication you have been prescribed  Notify the [facility] if you have any of the following: redness, drainage, swelling, headache, stiff neck or fever above 100°F     SPECIAL INSTRUCTIONS  Our office will contact you in approximately 7 days for a progress report  MEDICATIONS  Continue to take all routine medications  Our office may have instructed you to hold some medications  As no general anesthesia was used in today's procedure, you should not experience any side effects related to anesthesia  If you have a problem specifically related to your procedure, please call our office at [phone]  Problems not related to your procedure should be directed to your primary care physician

## 2022-06-16 NOTE — INTERVAL H&P NOTE
Update: (This section must be completed if the H&P was completed greater than 24 hrs to procedure or admission)    H&P reviewed  After examining the patient, I find no changed to the H&P since it had been written  Patient re-evaluated   Accept as history and physical     Romi Pelletier MD/June 16, 2022/1:01 PM

## 2022-07-15 NOTE — PROGRESS NOTES
Pain Medicine Follow-Up Note    Assessment:  1  Cervical radiculopathy    2  Myofascial pain syndrome    3  Cervical spinal stenosis    4  Thoracic radiculopathy        Plan:  Orders Placed This Encounter   Procedures    CT cervical spine without contrast     Standing Status:   Future     Standing Expiration Date:   7/18/2026     Scheduling Instructions: There is no prep for this study  Please bring your insurance cards, a form of photo ID and a list of your medications with you  Arrive 15 minutes prior to your appointment time to register  On the day of your test, please bring any prior CT or MRI studies of this area with you that were not performed at a [facility]  To schedule this appointment, please contact Central Scheduling at [phone]  Order Specific Question:   What is the patient's sedation requirement? Answer:   No Sedation     Order Specific Question:   Release to patient through Mychart     Answer:   Immediate     Order Specific Question:   Reason for Exam (FREE TEXT)     Answer:   cervical radiculopathy worsening    CT spine thoracic w wo contrast     Standing Status:   Future     Standing Expiration Date:   7/18/2026     Scheduling Instructions:      Nothing to eat 3 hours prior to this test   We encourage you to drink clear liquid up until the time of your study  Clear liquids include water, black coffee or tea, apple juice or clear broth  Please bring your insurance cards, a form of photo ID and a list of your medications with you  Arrive 15 minutes prior to your appointment time in order to register  On the day of your test, please bring any prior CT or MRI studies of this area with you that were not performed at a [facility]  To schedule this appointment, please contact Central Scheduling at [phone]  Order Specific Question:   What is the patient's sedation requirement?      Answer:   No Sedation     Order Specific Question:   Contrast information:     Answer:   No contrast     Order Specific Question:   Release to patient through Mychart     Answer:   Immediate     Order Specific Question:   Reason for Exam (FREE TEXT)     Answer:   pain right armpit, assessing for upper thoracic radiculopathy       No orders of the defined types were placed in this encounter  My impressions and treatment recommendations were discussed in detail with the patient who verbalized understanding and had no further questions  Is a 20-year-old male who returns to our office following C7-T1 cervical epidural steroid injection with decreased intensity in his symptoms  Reported up to 75% relief of his symptoms  He is still pain in the same areas and pain is slowly escalating  He has notable pain in the right parascapular region and right armpit region which he noted before  Neck pain is not as severe  I am wondering if he is also symptomatic from thoracic radiculopathy  Given his continued ongoing symptoms despite chiropractic treatments and injection, I would like to order cervical and thoracic CT scan  Patient is notably contraindicated from MRI due to shrapnel exposure  This will help us determine the next step which may include thoracic epidural steroid injection  We will determine the next course of action based on results of the imaging  He would definitely benefit from repeat injection, however we will wait until after imaging to see if injection site may need to be modified  [place] Prescription Drug Monitoring Program report was reviewed and was appropriate       Complete risks and benefits including bleeding, infection, tissue reaction, nerve injury and allergic reaction were discussed  The approach was demonstrated using models and literature was provided  Verbal and written consent was obtained  Discharge instructions were provided   I personally saw and examined the patient and I agree with the above discussed plan of care  History of Present Illness:    Emily Mar is a 40 y o  male who presents to [facility] for interval re-evaluation of the above stated pain complaints  The patient has a past medical and chronic pain history as outlined in the assessment section  He was last seen on 06/16/2022 for C7-T1 cervical epidural steroid injection with about 75% relief  He returns today with 7/10 pain in the neck, right parascapular area and right armpit region  Pain is worse in the evening and night  Pain is constant, burning, dull/aching, throbbing, pressure-like, shooting, numbness, pins and needles       Reports intensity of the pain is not close to where it was previously  Reports neck is 3-4 pain  STILL FEELS vice  sensation in armpit and down the trap and into the finger  Sitting and standing too long start to elevate his symptoms  He still sees chiropractor twice a wekk  Other than as stated above, the patient denies any interval changes in medications, medical condition, mental condition, symptoms, or allergies since the last office visit  Review of Systems:    Review of Systems   Respiratory: Negative for shortness of breath  Cardiovascular: Negative for chest pain  Gastrointestinal: Positive for nausea  Negative for constipation, diarrhea and vomiting  Musculoskeletal: Positive for myalgias  Negative for arthralgias, gait problem and joint swelling  Decreased ROM   Pain right arm   Skin: Negative for rash  Neurological: Negative for dizziness, seizures and weakness  All other systems reviewed and are negative          Patient Active Problem List   Diagnosis    Right hip impingement syndrome    Degenerative tear of acetabular labrum of right hip       Past Medical History:   Diagnosis Date    Fractures     Head injury     Hyperlipidemia     Hypertension     PTSD (post-traumatic stress disorder)        Past Surgical History: Procedure Laterality Date    ANKLE SURGERY      ARM DEBRIDEMENT      COLONOSCOPY      HIP SURGERY      KNEE SURGERY      SHOULDER SURGERY      WRIST SURGERY         Family History   Adopted: Yes       Social History     Occupational History    Not on file   Tobacco Use    Smoking status: Former Smoker    Smokeless tobacco: Former User   Vaping Use    Vaping Use: Never used   Substance and Sexual Activity    Alcohol use: Not Currently    Drug use: No    Sexual activity: Not on file         Current Outpatient Medications:     ALPRAZolam (XANAX) 0 5 mg tablet, PRN, Disp: , Rfl:     cetirizine (ZyrTEC) 10 mg tablet, Take 10 mg by mouth daily, Disp: , Rfl:     Cholecalciferol (Vitamin D3) 50 MCG (2000 UT) capsule, Vitamin D3 50 mcg (2,000 unit) tablet, Disp: , Rfl:     sertraline (ZOLOFT) 100 mg tablet, TAKE ONE TABLET BY MOUTH EVERY DAY FOR MOOD, Disp: , Rfl:     sildenafil (VIAGRA) 100 mg tablet, PRN, Disp: , Rfl:     traMADol (Ultram) 50 mg tablet, Take 1 tablet (50 mg total) by mouth every 8 (eight) hours as needed for severe pain, Disp: 15 tablet, Rfl: 0    VITAMIN D3 SUPER STRENGTH 2000 units tablet,  , Disp: , Rfl:     cyclobenzaprine (FLEXERIL) 5 mg tablet, Take 5 mg by mouth 3 (three) times a day as needed for muscle spasms (Patient not taking: Reported on 7/18/2022), Disp: , Rfl:     famotidine (PEPCID) 20 mg tablet, TAKE ONE TABLET BY MOUTH EVERY DAY AS NEEDED FOR GASTRIC REFLUX, Disp: , Rfl:     loratadine (CLARITIN) 10 mg tablet, Allergy Relief (loratadine) 10 mg tablet (Patient not taking: Reported on 7/18/2022), Disp: , Rfl:     No Known Allergies    Physical Exam:    /84 (BP Location: Left arm, Patient Position: Sitting, Cuff Size: Standard)   Wt 112 kg (246 lb)   BMI 34 31 kg/m²     Constitutional:normal, well developed, well nourished, alert, in no distress and non-toxic and no overt pain behavior    Eyes:anicteric  HEENT:grossly intact  Neck:supple, symmetric, trachea midline and no masses   Pulmonary:even and unlabored  Cardiovascular:No edema or pitting edema present  Skin:Normal without rashes or lesions and well hydrated  Psychiatric:Mood and affect appropriate  Neurologic:Cranial Nerves II-XII grossly intact  Musculoskeletal:normal      Imaging  CT cervical spine without contrast    (Results Pending)   CT spine thoracic w wo contrast    (Results Pending)         Orders Placed This Encounter   Procedures    CT cervical spine without contrast    CT spine thoracic w wo contrast

## 2022-07-18 ENCOUNTER — OFFICE VISIT (OUTPATIENT)
Dept: PAIN MEDICINE | Facility: CLINIC | Age: 42
End: 2022-07-18

## 2022-07-18 VITALS — DIASTOLIC BLOOD PRESSURE: 84 MMHG | BODY MASS INDEX: 34.31 KG/M2 | SYSTOLIC BLOOD PRESSURE: 112 MMHG | WEIGHT: 246 LBS

## 2022-07-18 DIAGNOSIS — M54.14 THORACIC RADICULOPATHY: ICD-10-CM

## 2022-07-18 DIAGNOSIS — M48.02 CERVICAL SPINAL STENOSIS: ICD-10-CM

## 2022-07-18 DIAGNOSIS — M79.18 MYOFASCIAL PAIN SYNDROME: ICD-10-CM

## 2022-07-18 DIAGNOSIS — M54.12 CERVICAL RADICULOPATHY: Primary | ICD-10-CM

## 2022-07-18 PROCEDURE — 99214 OFFICE O/P EST MOD 30 MIN: CPT | Performed by: STUDENT IN AN ORGANIZED HEALTH CARE EDUCATION/TRAINING PROGRAM

## 2022-07-21 ENCOUNTER — HOSPITAL ENCOUNTER (OUTPATIENT)
Dept: RADIOLOGY | Facility: MEDICAL CENTER | Age: 42
Discharge: HOME | End: 2022-07-21
Payer: MEDICARE

## 2022-07-21 DIAGNOSIS — M54.12 CERVICAL RADICULOPATHY: ICD-10-CM

## 2022-07-21 PROCEDURE — 72125 CT NECK SPINE W/O DYE: CPT

## 2022-07-21 PROCEDURE — G1004 CDSM NDSC: HCPCS

## 2022-08-01 ENCOUNTER — HOSPITAL ENCOUNTER (OUTPATIENT)
Dept: RADIOLOGY | Facility: MEDICAL CENTER | Age: 42
Discharge: HOME | End: 2022-08-01
Payer: MEDICARE

## 2022-08-01 DIAGNOSIS — M54.14 THORACIC RADICULOPATHY: ICD-10-CM

## 2022-08-01 PROCEDURE — 72128 CT CHEST SPINE W/O DYE: CPT

## 2022-08-01 PROCEDURE — G1004 CDSM NDSC: HCPCS

## 2022-08-03 ENCOUNTER — TELEPHONE (OUTPATIENT)
Dept: RADIOLOGY | Facility: CLINIC | Age: 42
End: 2022-08-03

## 2022-08-03 DIAGNOSIS — M54.12 CERVICAL RADICULOPATHY: Primary | ICD-10-CM

## 2022-08-03 NOTE — TELEPHONE ENCOUNTER
S/W pt and advised of results and potential plan  Pt states he has tried muscle relaxers in the past (Flexeril) and they were not too effective and just made him drowsy    Pt is agreeable to repeating DAMI    Please place order

## 2022-08-03 NOTE — TELEPHONE ENCOUNTER
----- Message from Gigi Su MD sent at 8/3/2022 12:58 PM EDT -----  Patient's CT of cervical and thoracic spine show some spasm in the muscles  He does not have any significant narrowing or stenosis in the thoracic spine, however in the neck he has spinal stenosis in the lower half of the neck and moderate to severe narrowing where the nerves are exiting  He can schedule repeat injection if he needs to, but I would also consider adding a muscle relaxant to his regimen

## 2022-08-10 NOTE — TELEPHONE ENCOUNTER
Left message for the patient to call to schedule his injection    Gave my direct phone number [phone]

## 2022-08-10 NOTE — TELEPHONE ENCOUNTER
Scheduled patient for 8/18/22  Patient denies RX blood thinners/ NSAIDS  Nothing to eat or drink 1 hour prior to procedure  Needs to arrange transportation  Proper clothing for procedure  No vaccines 2 weeks prior or after procedure  If ill or place on antibiotics, please call to reschedule

## 2022-08-18 ENCOUNTER — HOSPITAL ENCOUNTER (OUTPATIENT)
Dept: RADIOLOGY | Facility: CLINIC | Age: 42
Discharge: HOME | End: 2022-08-18
Payer: MEDICARE

## 2022-08-18 VITALS
DIASTOLIC BLOOD PRESSURE: 99 MMHG | OXYGEN SATURATION: 98 % | TEMPERATURE: 98.6 F | HEART RATE: 74 BPM | RESPIRATION RATE: 18 BRPM | SYSTOLIC BLOOD PRESSURE: 147 MMHG

## 2022-08-18 DIAGNOSIS — M54.12 CERVICAL RADICULOPATHY: ICD-10-CM

## 2022-08-18 PROCEDURE — 62321 NJX INTERLAMINAR CRV/THRC: CPT | Performed by: STUDENT IN AN ORGANIZED HEALTH CARE EDUCATION/TRAINING PROGRAM

## 2022-08-18 RX ORDER — METHYLPREDNISOLONE ACETATE 80 MG/ML
80 INJECTION, SUSPENSION INTRA-ARTICULAR; INTRALESIONAL; INTRAMUSCULAR; PARENTERAL; SOFT TISSUE ONCE
Status: COMPLETED | OUTPATIENT
Start: 2022-08-18 | End: 2022-08-18

## 2022-08-18 RX ADMIN — IOHEXOL 1 ML: 300 INJECTION, SOLUTION INTRAVENOUS at 12:06

## 2022-08-18 RX ADMIN — METHYLPREDNISOLONE ACETATE 80 MG: 80 INJECTION, SUSPENSION INTRA-ARTICULAR; INTRALESIONAL; INTRAMUSCULAR; PARENTERAL; SOFT TISSUE at 12:07

## 2022-08-18 NOTE — H&P
History of Present Illness:  The patient is a 40 y o  male who presents with complaints of neck and right arm pain    Patient Active Problem List   Diagnosis    Right hip impingement syndrome    Degenerative tear of acetabular labrum of right hip       Past Medical History:   Diagnosis Date    Fractures     Head injury     Hyperlipidemia     Hypertension     PTSD (post-traumatic stress disorder)        Past Surgical History:   Procedure Laterality Date    ANKLE SURGERY      ARM DEBRIDEMENT      COLONOSCOPY      HIP SURGERY      KNEE SURGERY      SHOULDER SURGERY      WRIST SURGERY           Current Outpatient Medications:     ALPRAZolam (XANAX) 0 5 mg tablet, PRN, Disp: , Rfl:     cetirizine (ZyrTEC) 10 mg tablet, Take 10 mg by mouth daily, Disp: , Rfl:     Cholecalciferol (Vitamin D3) 50 MCG (2000 UT) capsule, Vitamin D3 50 mcg (2,000 unit) tablet, Disp: , Rfl:     famotidine (PEPCID) 20 mg tablet, TAKE ONE TABLET BY MOUTH EVERY DAY AS NEEDED FOR GASTRIC REFLUX, Disp: , Rfl:     loratadine (CLARITIN) 10 mg tablet, Allergy Relief (loratadine) 10 mg tablet (Patient not taking: Reported on 7/18/2022), Disp: , Rfl:     sertraline (ZOLOFT) 100 mg tablet, TAKE ONE TABLET BY MOUTH EVERY DAY FOR MOOD, Disp: , Rfl:     sildenafil (VIAGRA) 100 mg tablet, PRN, Disp: , Rfl:     traMADol (Ultram) 50 mg tablet, Take 1 tablet (50 mg total) by mouth every 8 (eight) hours as needed for severe pain, Disp: 15 tablet, Rfl: 0    VITAMIN D3 SUPER STRENGTH 2000 units tablet,  , Disp: , Rfl:     No Known Allergies    Physical Exam:   Vitals:    08/18/22 1148   BP: 138/96   Pulse: 70   Resp: 18   Temp: 98 6 °F (37 °C)   SpO2: 98%     General: Awake, Alert, Oriented x 3, Mood and affect appropriate  Respiratory: Respirations even and unlabored  Cardiovascular: Peripheral pulses intact; no edema  Musculoskeletal Exam: neck and right arm pain    ASA Score: 3    Patient/Chart Verification  Patient ID Verified: Verbal  ID Band Applied: No  Consents Confirmed: Procedural, To be obtained in the Pre-Procedure area  Allergies Reviewed: Yes  Anticoag/NSAID held?: No  Currently on antibiotics?: No    Assessment:   1   Cervical radiculopathy        Plan: right C7-T1 DAMI

## 2022-08-18 NOTE — DISCHARGE INSTR - LAB
Epidural Steroid Injection   WHAT YOU NEED TO KNOW:   An epidural steroid injection (OLE) is a procedure to inject steroid medicine into the epidural space  The epidural space is between your spinal cord and vertebrae  Steroids reduce inflammation and fluid buildup in your spine that may be causing pain  You may be given pain medicine along with the steroids  ACTIVITY  Do not drive or operate machinery today  No strenuous activity today - bending, lifting, etc   You may resume normal activites starting tomorrow - start slowly and as tolerated  You may shower today, but no tub baths or hot tubs  You may have numbness for several hours from the local anesthetic  Please use caution and common sense, especially with weight-bearing activities  CARE OF THE INJECTION SITE  If you have soreness or pain, apply ice to the area today (20 minutes on/20 minutes off)  Starting tomorrow, you may use warm, moist heat or ice if needed  You may have an increase or change in your discomfort for 36-48 hours after your treatment  Apply ice and continue with any pain medication you have been prescribed  Notify the [facility] if you have any of the following: redness, drainage, swelling, headache, stiff neck or fever above 100°F     SPECIAL INSTRUCTIONS  Our office will contact you in approximately 7 days for a progress report  MEDICATIONS  Continue to take all routine medications  Our office may have instructed you to hold some medications  As no general anesthesia was used in today's procedure, you should not experience any side effects related to anesthesia  If you are diabetic, the steroids used in today's injection may temporarily increase your blood sugar levels after the first few days after your injection  Please keep a close eye on your sugars and alert the doctor who manages your diabetes if your sugars are significantly high from your baseline or you are symptomatic       If you have a problem specifically related to your procedure, please call our office at [phone]  Problems not related to your procedure should be directed to your primary care physician

## 2022-08-25 ENCOUNTER — TELEPHONE (OUTPATIENT)
Dept: PAIN MEDICINE | Facility: CLINIC | Age: 42
End: 2022-08-25

## 2022-08-25 DIAGNOSIS — M54.12 CERVICAL RADICULOPATHY: Primary | ICD-10-CM

## 2022-08-25 DIAGNOSIS — M48.02 NEURAL FORAMINAL STENOSIS OF CERVICAL SPINE: ICD-10-CM

## 2022-08-25 NOTE — TELEPHONE ENCOUNTER
Patient reports: 50% improvement post injection  Pain Level : 5-6/10  Patient mentioned you referring him to surgeon

## 2022-08-30 DIAGNOSIS — M54.12 CERVICAL RADICULOPATHY: Primary | ICD-10-CM

## 2022-08-30 NOTE — TELEPHONE ENCOUNTER
Patient is requesting a referral be faxed to his choice of Orthopedic surgeon listed below, [facility]  Dr Queen Dumas  Srinath# [phone]  Fax# [phone]

## 2022-09-26 ENCOUNTER — TELEPHONE (OUTPATIENT)
Dept: PAIN MEDICINE | Facility: CLINIC | Age: 42
End: 2022-09-26

## 2022-09-26 NOTE — LETTER
September 26, 2022    Patient: Samreen Keyes  YOB: 1980  Age:  40 y o  On *** (date) we received a request for [address] records  from {mro from:[id number]}      This request was {MRO To:[id number]}

## 2022-09-26 NOTE — TELEPHONE ENCOUNTER
Patient is scheduled for 9/28/22  He said that this is going through his [facility] ins  Which he said that he already has his referral now file  I made aware if there are any questions we will call him

## 2022-09-26 NOTE — TELEPHONE ENCOUNTER
Good afternoon I don't see [facility] referral in pts chart  We will need the referral before pt can be seen

## 2022-09-26 NOTE — TELEPHONE ENCOUNTER
Ok please reach out to the [place] for the referral  I told pt if we need anything we will reach out to him for any changes   I don't make out bound calls

## 2022-09-28 ENCOUNTER — OFFICE VISIT (OUTPATIENT)
Dept: PAIN MEDICINE | Facility: CLINIC | Age: 42
End: 2022-09-28
Payer: MEDICARE

## 2022-09-28 VITALS
HEART RATE: 87 BPM | BODY MASS INDEX: 33.45 KG/M2 | SYSTOLIC BLOOD PRESSURE: 128 MMHG | WEIGHT: 239.8 LBS | DIASTOLIC BLOOD PRESSURE: 83 MMHG

## 2022-09-28 DIAGNOSIS — M54.12 CERVICAL RADICULOPATHY: Primary | ICD-10-CM

## 2022-09-28 DIAGNOSIS — M79.18 MYOFASCIAL PAIN SYNDROME: ICD-10-CM

## 2022-09-28 DIAGNOSIS — M48.02 CERVICAL SPINAL STENOSIS: ICD-10-CM

## 2022-09-28 DIAGNOSIS — M54.2 NECK PAIN: ICD-10-CM

## 2022-09-28 PROCEDURE — 99214 OFFICE O/P EST MOD 30 MIN: CPT | Performed by: STUDENT IN AN ORGANIZED HEALTH CARE EDUCATION/TRAINING PROGRAM

## 2022-09-28 RX ORDER — PREGABALIN 100 MG/1
100 CAPSULE ORAL 2 TIMES DAILY
Qty: 60 CAPSULE | Refills: 0 | Status: SHIPPED | OUTPATIENT
Start: 2022-09-28

## 2022-09-28 RX ORDER — CELECOXIB 100 MG/1
100 CAPSULE ORAL 2 TIMES DAILY PRN
Qty: 30 CAPSULE | Refills: 0 | Status: SHIPPED | OUTPATIENT
Start: 2022-09-28 | End: 2022-10-21

## 2022-09-28 RX ORDER — OMEPRAZOLE 20 MG/1
CAPSULE, DELAYED RELEASE ORAL
COMMUNITY
Start: 2022-07-30

## 2022-09-28 RX ORDER — HYDROXYZINE HYDROCHLORIDE 25 MG/1
25 TABLET, FILM COATED ORAL EVERY 6 HOURS PRN
COMMUNITY
Start: 2022-09-01

## 2022-09-28 RX ORDER — PREGABALIN 100 MG/1
100 CAPSULE ORAL DAILY
Qty: 60 CAPSULE | Refills: 0 | Status: SHIPPED | OUTPATIENT
Start: 2022-09-28 | End: 2022-09-28

## 2022-09-28 NOTE — PROGRESS NOTES
Pain Medicine Follow-Up Note    Assessment:  1  Cervical radiculopathy    2  Myofascial pain syndrome    3  Cervical spinal stenosis    4  Neck pain        Plan:  Orders Placed This Encounter   Procedures    FL spine and pain procedure     Standing Status:   Future     Standing Expiration Date:   9/28/2026     Order Specific Question:   Reason for Exam:     Answer:   right C7-T1 DAMI     Order Specific Question:   Anticoagulant hold needed? Answer:   NSAID       New Medications Ordered This Visit   Medications    hydrOXYzine HCL (ATARAX) 25 mg tablet     Sig: Take 25 mg by mouth every 6 (six) hours as needed    omeprazole (PriLOSEC) 20 mg delayed release capsule    celecoxib (CeleBREX) 100 mg capsule     Sig: Take 1 capsule (100 mg total) by mouth 2 (two) times a day as needed for moderate pain     Dispense:  30 capsule     Refill:  0    pregabalin (Lyrica) 100 mg capsule     Sig: Take 1 capsule (100 mg total) by mouth 2 (two) times a day Start by taking 1 capsule at bedtime for at least 2 days  Then increase to 1 capsule twice a day thereafter  Dispense:  60 capsule     Refill:  0       My impressions and treatment recommendations were discussed in detail with the patient who verbalized understanding and had no further questions  This is a 20-year-old male who returns to our office with complaints as noted below  He most recently underwent repeat C7-T1 cervical epidural steroid injection with 75% improvement for about 2 months  Returns today with same symptoms and reports they are same as before the injection  Had discussion with patient regarding overall treatment  He recently saw Dr Angel Shaffer at [facility]  He is a surgical candidate but is unsure if he would like to proceed  He is seeing him again in 7 weeks  In regards to interventional treatment options, discussed that we can repeat epidural steroid injection 1 more time    He will schedule this closer to Thanksgiving before he goes away  Regarding pharmacological treatment options, patient has tried several agents with no significant relief  In the past has tried gabapentin with no relief  Also reports being on Lyrica many years ago  It has been quite sometime since patient has been on any neuropathic agents  We discussed starting again with Lyrica 100 mg b i d  on a dose titration schedule as written on the prescription bottle  We also discussed trialing duloxetine has patient has been off of Zoloft for over a month  Patient reports he no longer has a psychiatrist   He was supposed to receive a call from [facility] and has not received a phone call in 2 months  I offered the patient to referral to see a psychiatrist here at [address] [place] [address] the patient declined  Patient reports that he does not want to be on antidepressant medications in that if his pain were controlled, depression  I did specifically discuss duloxetine It was discussed with the patient that Cymbalta is FDA approved for chronic muscle skeletal pain and can also help with neuropathic pain  It can also help with any pain associated depression and anxiety symptoms  Patient declines  Regarding other medications, patient may benefit from rotating to a different NSAID for his inflammatory pain  He will discontinue ibuprofen and start Celebrex 100 mg b i d  p r n     Can potentially escalate up to 200 mg b i d  p r n       Regarding opioid medications, patient reports that he took 1 of his wife's pills after  found notable relief  He shares with me that he has been on high-dose narcotics in the past   I discussed with my reservation for starting chronic opioids for his condition  Discussed that they are not the best medications to help with chronic neck pain and that tolerance and potentially dependency are possibilities  Furthermore, chronic opioids may complicate postoperative recovery should he choose to get neck surgery  I do believe patient's underlying mood disorder does play role in the pain that is affecting his life and suffering  I still think he would benefit from reestablishing care with psychiatry  Lastly, regarding surgery, I did offer the patient the opportunity to seek a 2nd opinion with [facility]  Discussed that we have neurosurgeons in the [place] area  Patient declined as he notes that this is too far for him, though he lives in [place] and drives 20 minutes to see [facility] Prescription Drug Monitoring Program report was reviewed and was appropriate       Complete risks and benefits including bleeding, infection, tissue reaction, nerve injury and allergic reaction were discussed  The approach was demonstrated using models and literature was provided  Verbal and written consent was obtained  Follow-up is planned in 4w time or sooner as warranted  Discharge instructions were provided  I personally saw and examined the patient and I agree with the above discussed plan of care  History of Present Illness:    Jessica Romano is a 40 y o  male who presents to [facility] for interval re-evaluation of the above stated pain complaints  The patient has a past medical and chronic pain history as outlined in the assessment section  He was last seen on 08/18/2022 for repeat C7-T1 cervical epidural steroid injection with 75% relief for 2 months  Returns today with 9/10 pain that is worse in the evening and night  Pain is constant  Pain is dull/aching, throbbing, cramping with pins and needles  It is primarily in the right shoulder right upper extremity  Right parascapular region  Waldemar Huerta He saw Dr Cecil Katz who is recommending ACDF  Unsure if he wants to do surgery  He wants to try to hold off as much as possible  He took a pain pill from his wife from her csection and reports all his pain went away       Reports he was taking pregabalin and lyrica in the past without relief  Unsure what dose of pregabalin  For 8-10 year period he was taking a lot of opioid medications, MS Contin and percocet  He reports being off of zoloft for about 1 month  He does not have a psychiatrist  He has referral to new psychiatrist in [facility] but was not given a call back  He reports his pain is impacting his mood and would rather be "depressed" and deal with the pain  He feels that his mood disorder is directly related to his pain  Other than as stated above, the patient denies any interval changes in medications, medical condition, mental condition, symptoms, or allergies since the last office visit  Review of Systems:    Review of Systems   Respiratory: Negative for shortness of breath  Cardiovascular: Negative for chest pain  Gastrointestinal: Positive for nausea  Negative for constipation, diarrhea and vomiting  Musculoskeletal: Positive for arthralgias and myalgias  Negative for gait problem and joint swelling  Decreased ROM   Pain in right arm, right ankle and right wrist and left knee   Skin: Negative for rash  Neurological: Negative for dizziness, seizures and weakness  All other systems reviewed and are negative          Patient Active Problem List   Diagnosis    Right hip impingement syndrome    Degenerative tear of acetabular labrum of right hip       Past Medical History:   Diagnosis Date    Fractures     Head injury     Hyperlipidemia     Hypertension     PTSD (post-traumatic stress disorder)        Past Surgical History:   Procedure Laterality Date    ANKLE SURGERY      ARM DEBRIDEMENT      COLONOSCOPY      HIP SURGERY      KNEE SURGERY      SHOULDER SURGERY      WRIST SURGERY         Family History   Adopted: Yes       Social History     Occupational History    Not on file   Tobacco Use    Smoking status: Former Smoker    Smokeless tobacco: Former User   Vaping Use    Vaping Use: Never used   Substance and Sexual Activity    Alcohol use: Not Currently    Drug use: No    Sexual activity: Not on file         Current Outpatient Medications:     celecoxib (CeleBREX) 100 mg capsule, Take 1 capsule (100 mg total) by mouth 2 (two) times a day as needed for moderate pain, Disp: 30 capsule, Rfl: 0    cetirizine (ZyrTEC) 10 mg tablet, Take 10 mg by mouth daily, Disp: , Rfl:     Cholecalciferol (Vitamin D3) 50 MCG (2000 UT) capsule, Vitamin D3 50 mcg (2,000 unit) tablet, Disp: , Rfl:     hydrOXYzine HCL (ATARAX) 25 mg tablet, Take 25 mg by mouth every 6 (six) hours as needed, Disp: , Rfl:     pregabalin (Lyrica) 100 mg capsule, Take 1 capsule (100 mg total) by mouth 2 (two) times a day Start by taking 1 capsule at bedtime for at least 2 days   Then increase to 1 capsule twice a day thereafter , Disp: 60 capsule, Rfl: 0    sildenafil (VIAGRA) 100 mg tablet, PRN, Disp: , Rfl:     VITAMIN D3 SUPER STRENGTH 2000 units tablet,  , Disp: , Rfl:     ALPRAZolam (XANAX) 0 5 mg tablet, PRN (Patient not taking: Reported on 9/28/2022), Disp: , Rfl:     famotidine (PEPCID) 20 mg tablet, TAKE ONE TABLET BY MOUTH EVERY DAY AS NEEDED FOR GASTRIC REFLUX, Disp: , Rfl:     loratadine (CLARITIN) 10 mg tablet, Allergy Relief (loratadine) 10 mg tablet (Patient not taking: No sig reported), Disp: , Rfl:     omeprazole (PriLOSEC) 20 mg delayed release capsule, , Disp: , Rfl:     sertraline (ZOLOFT) 100 mg tablet, TAKE ONE TABLET BY MOUTH EVERY DAY FOR MOOD (Patient not taking: Reported on 9/28/2022), Disp: , Rfl:     traMADol (Ultram) 50 mg tablet, Take 1 tablet (50 mg total) by mouth every 8 (eight) hours as needed for severe pain (Patient not taking: Reported on 9/28/2022), Disp: 15 tablet, Rfl: 0    No Known Allergies    Physical Exam:    /83 (BP Location: Right arm, Patient Position: Sitting, Cuff Size: Standard)   Pulse 87   Wt 109 kg (239 lb 12 8 oz)   BMI 33 45 kg/m²     Constitutional:normal, well developed, well nourished, alert, in no distress and non-toxic and no overt pain behavior    Eyes:anicteric  HEENT:grossly intact  Neck:supple, symmetric, trachea midline and no masses   Pulmonary:even and unlabored  Cardiovascular:No edema or pitting edema present  Skin:Normal without rashes or lesions and well hydrated  Psychiatric:Mood and affect appropriate  Neurologic:Cranial Nerves II-XII grossly intact  Musculoskeletal:normal      Imaging  FL spine and pain procedure    (Results Pending)         Orders Placed This Encounter   Procedures    FL spine and pain procedure

## 2022-10-21 DIAGNOSIS — M54.2 NECK PAIN: ICD-10-CM

## 2022-10-21 RX ORDER — CELECOXIB 200 MG/1
200 CAPSULE ORAL 2 TIMES DAILY PRN
Qty: 60 CAPSULE | Refills: 0 | Status: SHIPPED | OUTPATIENT
Start: 2022-10-21 | End: 2022-12-27 | Stop reason: SDUPTHER

## 2022-11-03 ENCOUNTER — TELEPHONE (OUTPATIENT)
Dept: RADIOLOGY | Facility: CLINIC | Age: 42
End: 2022-11-03

## 2022-11-03 ENCOUNTER — HOSPITAL ENCOUNTER (OUTPATIENT)
Dept: RADIOLOGY | Facility: CLINIC | Age: 42
End: 2022-11-03

## 2022-11-03 VITALS
HEART RATE: 83 BPM | TEMPERATURE: 97.7 F | SYSTOLIC BLOOD PRESSURE: 155 MMHG | DIASTOLIC BLOOD PRESSURE: 102 MMHG | RESPIRATION RATE: 20 BRPM

## 2022-11-03 DIAGNOSIS — M48.02 CERVICAL SPINAL STENOSIS: ICD-10-CM

## 2022-11-03 DIAGNOSIS — M54.12 CERVICAL RADICULOPATHY: ICD-10-CM

## 2022-11-03 RX ORDER — PREGABALIN 150 MG/1
150 CAPSULE ORAL 2 TIMES DAILY
Qty: 60 CAPSULE | Refills: 1 | Status: SHIPPED | OUTPATIENT
Start: 2022-11-03 | End: 2022-11-08

## 2022-11-03 NOTE — PROGRESS NOTES
CERVICAL EPIDURAL STEROID INJECTION NOT PERFORMED. CANCELED PROCEDURE IN HOLDING AREA DUE TO LACK OF 4 DAY HOLD OF CELEBREX.   PATIENT RESCHEDULE

## 2022-11-03 NOTE — H&P
History of Present Illness: The patient is a 40 y o  male who presents with complaints of right neck, arm, periscapular pain  Past Medical History:   Diagnosis Date   • Fractures    • Head injury    • Hyperlipidemia    • Hypertension    • PTSD (post-traumatic stress disorder)        Past Surgical History:   Procedure Laterality Date   • ANKLE SURGERY     • ARM DEBRIDEMENT     • COLONOSCOPY     • HIP SURGERY     • KNEE SURGERY     • SHOULDER SURGERY     • WRIST SURGERY           Current Outpatient Medications:   •  ALPRAZolam (XANAX) 0 5 mg tablet, PRN (Patient not taking: Reported on 9/28/2022), Disp: , Rfl:   •  celecoxib (CeleBREX) 200 mg capsule, Take 1 capsule (200 mg total) by mouth 2 (two) times a day as needed for moderate pain, Disp: 60 capsule, Rfl: 0  •  cetirizine (ZyrTEC) 10 mg tablet, Take 10 mg by mouth daily, Disp: , Rfl:   •  Cholecalciferol (Vitamin D3) 50 MCG (2000 UT) capsule, Vitamin D3 50 mcg (2,000 unit) tablet, Disp: , Rfl:   •  famotidine (PEPCID) 20 mg tablet, TAKE ONE TABLET BY MOUTH EVERY DAY AS NEEDED FOR GASTRIC REFLUX, Disp: , Rfl:   •  hydrOXYzine HCL (ATARAX) 25 mg tablet, Take 25 mg by mouth every 6 (six) hours as needed, Disp: , Rfl:   •  loratadine (CLARITIN) 10 mg tablet, Allergy Relief (loratadine) 10 mg tablet (Patient not taking: No sig reported), Disp: , Rfl:   •  omeprazole (PriLOSEC) 20 mg delayed release capsule, , Disp: , Rfl:   •  pregabalin (Lyrica) 100 mg capsule, Take 1 capsule (100 mg total) by mouth 2 (two) times a day Start by taking 1 capsule at bedtime for at least 2 days   Then increase to 1 capsule twice a day thereafter , Disp: 60 capsule, Rfl: 0  •  sertraline (ZOLOFT) 100 mg tablet, TAKE ONE TABLET BY MOUTH EVERY DAY FOR MOOD (Patient not taking: Reported on 9/28/2022), Disp: , Rfl:   •  sildenafil (VIAGRA) 100 mg tablet, PRN, Disp: , Rfl:   •  traMADol (Ultram) 50 mg tablet, Take 1 tablet (50 mg total) by mouth every 8 (eight) hours as needed for severe pain (Patient not taking: Reported on 9/28/2022), Disp: 15 tablet, Rfl: 0  •  VITAMIN D3 SUPER STRENGTH 2000 units tablet,  , Disp: , Rfl:     No Known Allergies    Physical Exam:   Vitals:    11/03/22 1135   BP: (!) 155/102   Pulse: 83   Resp: 20   Temp: 97 7 °F (36 5 °C)     General: Awake, Alert, Oriented x 3, Mood and affect appropriate  Respiratory: Respirations even and unlabored  Cardiovascular: Peripheral pulses intact; no edema  Musculoskeletal Exam: TTP right cervical region, right perscapular region    ASA Score: 2    Patient/Chart Verification  Patient ID Verified: Verbal  ID Band Applied: No  Consents Confirmed: To be obtained in the Pre-Procedure area  H&P( within 30 days) Verified: To be obtained in the Pre-Procedure area  Interval H&P(within 24 hr) Complete (required for Outpatients and Surgery Admit only): To be obtained in the Pre-Procedure area  Allergies Reviewed: Yes  Anticoag/NSAID held?: Yes (celebrex last done on Monday)  Currently on antibiotics?: No  Pregnancy denied?: NA    Assessment:   1  Cervical radiculopathy    2   Cervical spinal stenosis        Plan: right C7-T1 DAMI

## 2022-11-03 NOTE — DISCHARGE INSTR - LAB
Epidural Steroid Injection   WHAT YOU NEED TO KNOW:   An epidural steroid injection (OLE) is a procedure to inject steroid medicine into the epidural space  The epidural space is between your spinal cord and vertebrae  Steroids reduce inflammation and fluid buildup in your spine that may be causing pain  You may be given pain medicine along with the steroids  ACTIVITY  Do not drive or operate machinery today  No strenuous activity today - bending, lifting, etc   You may resume normal activites starting tomorrow - start slowly and as tolerated  You may shower today, but no tub baths or hot tubs  You may have numbness for several hours from the local anesthetic  Please use caution and common sense, especially with weight-bearing activities  CARE OF THE INJECTION SITE  If you have soreness or pain, apply ice to the area today (20 minutes on/20 minutes off)  Starting tomorrow, you may use warm, moist heat or ice if needed  You may have an increase or change in your discomfort for 36-48 hours after your treatment  Apply ice and continue with any pain medication you have been prescribed  Notify the [facility] if you have any of the following: redness, drainage, swelling, headache, stiff neck or fever above 100°F     SPECIAL INSTRUCTIONS  Our office will contact you in approximately 7 days for a progress report  MEDICATIONS  Continue to take all routine medications  Our office may have instructed you to hold some medications  As no general anesthesia was used in today's procedure, you should not experience any side effects related to anesthesia  If you are diabetic, the steroids used in today's injection may temporarily increase your blood sugar levels after the first few days after your injection  Please keep a close eye on your sugars and alert the doctor who manages your diabetes if your sugars are significantly high from your baseline or you are symptomatic       If you have a problem specifically related to your procedure, please call our office at [phone]  Problems not related to your procedure should be directed to your primary care physician

## 2022-11-07 ENCOUNTER — TELEPHONE (OUTPATIENT)
Dept: PAIN MEDICINE | Facility: CLINIC | Age: 42
End: 2022-11-07

## 2022-11-07 NOTE — TELEPHONE ENCOUNTER
Kaden Gardner- Billing dept     Doctor: [facility]    Reason for call: Billing dept is calling in asking if the [place] sent over a referral for this pt to be seen at [facility]  They have been unable to locate the referral in pts chart  They can olny see the referral for Gastro  Pt is requesting for the billing office to send all bills to the [place]   Please follow up with     Call back#: [phone]

## 2022-11-08 ENCOUNTER — HOSPITAL ENCOUNTER (OUTPATIENT)
Dept: RADIOLOGY | Facility: CLINIC | Age: 42
Discharge: HOME | End: 2022-11-08

## 2022-11-08 VITALS
RESPIRATION RATE: 18 BRPM | OXYGEN SATURATION: 97 % | TEMPERATURE: 97.4 F | HEART RATE: 77 BPM | DIASTOLIC BLOOD PRESSURE: 89 MMHG | SYSTOLIC BLOOD PRESSURE: 140 MMHG

## 2022-11-08 DIAGNOSIS — M54.12 CERVICAL RADICULOPATHY: ICD-10-CM

## 2022-11-08 DIAGNOSIS — M48.02 CERVICAL SPINAL STENOSIS: ICD-10-CM

## 2022-11-08 RX ORDER — METHYLPREDNISOLONE ACETATE 80 MG/ML
80 INJECTION, SUSPENSION INTRA-ARTICULAR; INTRALESIONAL; INTRAMUSCULAR; PARENTERAL; SOFT TISSUE ONCE
Status: COMPLETED | OUTPATIENT
Start: 2022-11-08 | End: 2022-11-08

## 2022-11-08 RX ORDER — PREGABALIN 100 MG/1
100 CAPSULE ORAL 2 TIMES DAILY
Qty: 60 CAPSULE | Refills: 1 | Status: SHIPPED | OUTPATIENT
Start: 2022-11-08 | End: 2022-12-27 | Stop reason: SDUPTHER

## 2022-11-08 RX ADMIN — IOHEXOL 1 ML: 300 INJECTION, SOLUTION INTRAVENOUS at 13:07

## 2022-11-08 RX ADMIN — METHYLPREDNISOLONE ACETATE 80 MG: 80 INJECTION, SUSPENSION INTRA-ARTICULAR; INTRALESIONAL; INTRAMUSCULAR; PARENTERAL; SOFT TISSUE at 13:08

## 2022-11-08 NOTE — INTERVAL H&P NOTE
Update: (This section must be completed if the H&P was completed greater than 24 hrs to procedure or admission)    H&P reviewed  After examining the patient, I find no changed to the H&P since it had been written  Patient re-evaluated   Accept as history and physical     Denis Ling/November 8, 2022/12:52 PM

## 2022-11-08 NOTE — DISCHARGE INSTR - LAB
Epidural Steroid Injection   WHAT YOU NEED TO KNOW:   An epidural steroid injection (OLE) is a procedure to inject steroid medicine into the epidural space  The epidural space is between your spinal cord and vertebrae  Steroids reduce inflammation and fluid buildup in your spine that may be causing pain  You may be given pain medicine along with the steroids  ACTIVITY  Do not drive or operate machinery today  No strenuous activity today - bending, lifting, etc   You may resume normal activites starting tomorrow - start slowly and as tolerated  You may shower today, but no tub baths or hot tubs  You may have numbness for several hours from the local anesthetic  Please use caution and common sense, especially with weight-bearing activities  CARE OF THE INJECTION SITE  If you have soreness or pain, apply ice to the area today (20 minutes on/20 minutes off)  Starting tomorrow, you may use warm, moist heat or ice if needed  You may have an increase or change in your discomfort for 36-48 hours after your treatment  Apply ice and continue with any pain medication you have been prescribed  Notify the [facility] if you have any of the following: redness, drainage, swelling, headache, stiff neck or fever above 100°F     SPECIAL INSTRUCTIONS  Our office will contact you in approximately 7 days for a progress report  MEDICATIONS  Continue to take all routine medications  Our office may have instructed you to hold some medications  As no general anesthesia was used in today's procedure, you should not experience any side effects related to anesthesia  If you are diabetic, the steroids used in today's injection may temporarily increase your blood sugar levels after the first few days after your injection  Please keep a close eye on your sugars and alert the doctor who manages your diabetes if your sugars are significantly high from your baseline or you are symptomatic       If you have a problem specifically related to your procedure, please call our office at [phone]  Problems not related to your procedure should be directed to your primary care physician

## 2022-11-08 NOTE — TELEPHONE ENCOUNTER
Rec faxed [employer], scanned in media  S/w Avril Kruse @ [facility] and provided [employer]

## 2022-12-27 DIAGNOSIS — M48.02 CERVICAL SPINAL STENOSIS: ICD-10-CM

## 2022-12-27 DIAGNOSIS — M54.2 NECK PAIN: ICD-10-CM

## 2022-12-27 DIAGNOSIS — M54.12 CERVICAL RADICULOPATHY: ICD-10-CM

## 2022-12-27 RX ORDER — PREGABALIN 100 MG/1
100 CAPSULE ORAL 2 TIMES DAILY
Qty: 60 CAPSULE | Refills: 1 | Status: SHIPPED | OUTPATIENT
Start: 2022-12-27 | End: 2023-04-26 | Stop reason: SDUPTHER

## 2022-12-27 RX ORDER — CELECOXIB 200 MG/1
200 CAPSULE ORAL 2 TIMES DAILY PRN
Qty: 60 CAPSULE | Refills: 0 | Status: SHIPPED | OUTPATIENT
Start: 2022-12-27 | End: 2023-02-15 | Stop reason: SDUPTHER

## 2023-01-04 ENCOUNTER — OFFICE VISIT (OUTPATIENT)
Dept: CARDIOLOGY CLINIC | Facility: CLINIC | Age: 43
End: 2023-01-04

## 2023-01-04 VITALS
HEART RATE: 73 BPM | DIASTOLIC BLOOD PRESSURE: 102 MMHG | BODY MASS INDEX: 35 KG/M2 | WEIGHT: 250 LBS | HEIGHT: 71 IN | SYSTOLIC BLOOD PRESSURE: 144 MMHG

## 2023-01-04 DIAGNOSIS — I10 ESSENTIAL (PRIMARY) HYPERTENSION: ICD-10-CM

## 2023-01-04 DIAGNOSIS — R00.2 PALPITATIONS: Primary | ICD-10-CM

## 2023-01-04 RX ORDER — LAMOTRIGINE 25 MG/1
1 TABLET ORAL EVERY EVENING
COMMUNITY
Start: 2022-10-12 | End: 2023-01-11

## 2023-01-04 RX ORDER — LISINOPRIL AND HYDROCHLOROTHIAZIDE 12.5; 1 MG/1; MG/1
1 TABLET ORAL DAILY
Qty: 90 TABLET | Refills: 5 | Status: SHIPPED | OUTPATIENT
Start: 2023-01-04

## 2023-01-04 RX ORDER — DIAZEPAM 5 MG/1
TABLET ORAL
COMMUNITY
Start: 2022-10-12

## 2023-01-04 NOTE — PROGRESS NOTES
Patient ID: Felecia Saravia is a 40 y o  male  Plan:      Essential (primary) hypertension  Runs high at home  He will monitor at home but I am starting him on meds  Palpitations  Comment over the years but no syncope or any recent change in exertional capacity  Prior work-up on [address] was normal   He knows he has some anxiety and perhaps this is contributing  Echo was ordered for this reason as well as to look for LVH regarding his hypertension but assuming that is okay I do not believe further work-up is needed  Follow up Plan/Other summary comments:  I gave him a recommendation for primary care physician  If he is happy there then follow-up with me can be as needed assuming his echo is normal   As a place keeper I wrote for follow-up in 6 months  Today I am starting lisinopril with hydrochlorothiazide and also ordering a BMP to be done in 1 to 2 weeks  HPI: Patient is seen today to establish care  [place] moved from [place] in the last few years but has not had local follow-up  He was seeing a cardiologist in [place] because of palpitations  He describes a normal work-up  Other issues include persistent hypertension  Nena Rosenberg is on disability having served in [employer] in [employer]  There is no family history of early CAD  No recent chest pain or change in exertional capacity  Results for orders placed or performed in visit on 01/04/23   POCT ECG    Impression    NSR  WNL  Most recent or relevant cardiac/vascular testing:    None that I could find  Past Surgical History:   Procedure Laterality Date   • ANKLE SURGERY     • ARM DEBRIDEMENT     • COLONOSCOPY     • HIP SURGERY     • KNEE SURGERY     • SHOULDER SURGERY     • WRIST SURGERY         Lipid Profile: No results found for: CHOL, TRIG, HDL, LDL      Review of Systems   10  point ROS  was otherwise non pertinent or negative except as per HPI or as below     Gait: Normal         Objective:     BP (!) 144/102   Pulse 73   Ht 5' 11" (1 803 m)   Wt 113 kg (250 lb)   BMI 34 87 kg/m²     PHYSICAL EXAM:    General:  Normal appearance in no distress  Eyes:  Anicteric  Oral mucosa:  Moist   Neck:  No JVD  Carotid upstrokes are brisk without bruits  No masses  Chest:  Clear to auscultation  Cardiac:  No palpable PMI  Normal S1 and S2  No murmur gallop or rub  Abdomen:  Soft and nontender  No palpable organomegaly or aortic enlargement  Extremities:  No peripheral edema  Musculoskeletal:  Symmetric  Vascular:  Femoral pulses are brisk without bruits  Popliteal pulses are intact bilaterally  Pedal pulses are intact  Neuro:  Grossly symmetric  Psych:  Alert and oriented x3          Current Outpatient Medications:   •  ALPRAZolam (XANAX) 0 5 mg tablet, PRN, Disp: , Rfl:   •  celecoxib (CeleBREX) 200 mg capsule, Take 1 capsule (200 mg total) by mouth 2 (two) times a day as needed for moderate pain, Disp: 60 capsule, Rfl: 0  •  cetirizine (ZyrTEC) 10 mg tablet, Take 10 mg by mouth daily, Disp: , Rfl:   •  Cholecalciferol (Vitamin D3) 50 MCG (2000 UT) capsule, Vitamin D3 50 mcg (2,000 unit) tablet, Disp: , Rfl:   •  diazepam (VALIUM) 5 mg tablet, , Disp: , Rfl:   •  famotidine (PEPCID) 20 mg tablet, TAKE ONE TABLET BY MOUTH EVERY DAY AS NEEDED FOR GASTRIC REFLUX, Disp: , Rfl:   •  lamoTRIgine (LaMICtal) 25 mg tablet, Take 1 tablet by mouth every evening, Disp: , Rfl:   •  lisinopril-hydrochlorothiazide (PRINZIDE,ZESTORETIC) 10-12 5 MG per tablet, Take 1 tablet by mouth daily, Disp: 90 tablet, Rfl: 5  •  omeprazole (PriLOSEC) 20 mg delayed release capsule, , Disp: , Rfl:   •  pregabalin (Lyrica) 100 mg capsule, Take 1 capsule (100 mg total) by mouth 2 (two) times a day, Disp: 60 capsule, Rfl: 1  •  sildenafil (VIAGRA) 100 mg tablet, PRN, Disp: , Rfl:   •  VITAMIN D3 SUPER STRENGTH 2000 units tablet,  , Disp: , Rfl:   •  hydrOXYzine HCL (ATARAX) 25 mg tablet, Take 25 mg by mouth every 6 (six) hours as needed (Patient not taking: Reported on 1/4/2023), Disp: , Rfl:   •  loratadine (CLARITIN) 10 mg tablet, Allergy Relief (loratadine) 10 mg tablet (Patient not taking: Reported on 7/18/2022), Disp: , Rfl:   •  sertraline (ZOLOFT) 100 mg tablet, TAKE ONE TABLET BY MOUTH EVERY DAY FOR MOOD (Patient not taking: Reported on 1/4/2023), Disp: , Rfl:   •  traMADol (Ultram) 50 mg tablet, Take 1 tablet (50 mg total) by mouth every 8 (eight) hours as needed for severe pain (Patient not taking: Reported on 9/28/2022), Disp: 15 tablet, Rfl: 0  No Known Allergies  Past Medical History:   Diagnosis Date   • Fractures    • Head injury    • Hyperlipidemia    • Hypertension    • PTSD (post-traumatic stress disorder)            Social History     Tobacco Use   Smoking Status Former   Smokeless Tobacco Former

## 2023-01-04 NOTE — ASSESSMENT & PLAN NOTE
Comment over the years but no syncope or any recent change in exertional capacity  Prior work-up on [address] was normal   He knows he has some anxiety and perhaps this is contributing  Echo was ordered for this reason as well as to look for LVH regarding his hypertension but assuming that is okay I do not believe further work-up is needed

## 2023-01-09 ENCOUNTER — TELEPHONE (OUTPATIENT)
Dept: ADMINISTRATIVE | Facility: OTHER | Age: 43
End: 2023-01-09

## 2023-01-09 NOTE — TELEPHONE ENCOUNTER
----- Message from Mavis Traylor MA sent at 1/9/2023 10:44 AM EST -----  Regarding: awv  01/09/23 10:44 AM    Hello, our patient Marco Sadler has had Medicare AWV completed/performed  Please assist in updating the patient chart by pulling the Care Everywhere (CE) document  The date of service is 9/1/22       Thank you,  Mavis Traylor MA  [facility] PRIMARY CARE

## 2023-01-09 NOTE — TELEPHONE ENCOUNTER
Upon review of the In Basket request we were able to locate, review, and update the patient chart as requested for Medicare AW  Any additional questions or concerns should be emailed to the Practice Liaisons via the appropriate education email address, please do not reply via In Basket      Thank you  Sagrario Maria MA

## 2023-01-11 ENCOUNTER — OFFICE VISIT (OUTPATIENT)
Dept: FAMILY MEDICINE CLINIC | Facility: CLINIC | Age: 43
End: 2023-01-11

## 2023-01-11 VITALS
WEIGHT: 259 LBS | HEART RATE: 67 BPM | DIASTOLIC BLOOD PRESSURE: 76 MMHG | TEMPERATURE: 96.6 F | SYSTOLIC BLOOD PRESSURE: 110 MMHG | HEIGHT: 71 IN | BODY MASS INDEX: 36.26 KG/M2 | OXYGEN SATURATION: 97 %

## 2023-01-11 DIAGNOSIS — Z11.59 NEED FOR HEPATITIS C SCREENING TEST: ICD-10-CM

## 2023-01-11 DIAGNOSIS — N52.9 ERECTILE DYSFUNCTION, UNSPECIFIED ERECTILE DYSFUNCTION TYPE: ICD-10-CM

## 2023-01-11 DIAGNOSIS — F31.9 BIPOLAR AFFECTIVE DISORDER, REMISSION STATUS UNSPECIFIED (HCC): ICD-10-CM

## 2023-01-11 DIAGNOSIS — Z11.4 SCREENING FOR HUMAN IMMUNODEFICIENCY VIRUS: ICD-10-CM

## 2023-01-11 DIAGNOSIS — Z76.89 ESTABLISHING CARE WITH NEW DOCTOR, ENCOUNTER FOR: Primary | ICD-10-CM

## 2023-01-11 DIAGNOSIS — I10 ESSENTIAL (PRIMARY) HYPERTENSION: ICD-10-CM

## 2023-01-11 DIAGNOSIS — M50.223 HERNIATION OF INTERVERTEBRAL DISC AT C6-C7 LEVEL: ICD-10-CM

## 2023-01-11 PROBLEM — M54.12 CERVICAL RADICULITIS: Status: ACTIVE | Noted: 2017-07-27

## 2023-01-11 PROBLEM — R68.89 OTHER GENERAL SYMPTOMS: Status: RESOLVED | Noted: 2023-01-11 | Resolved: 2023-01-11

## 2023-01-11 PROBLEM — E66.9 OBESITY: Status: ACTIVE | Noted: 2023-01-11

## 2023-01-11 PROBLEM — H91.90 HEARING LOSS: Status: ACTIVE | Noted: 2017-01-23

## 2023-01-11 PROBLEM — S54.20XA: Status: ACTIVE | Noted: 2023-01-11

## 2023-01-11 PROBLEM — M25.851 RIGHT HIP IMPINGEMENT SYNDROME: Status: RESOLVED | Noted: 2018-02-19 | Resolved: 2023-01-11

## 2023-01-11 PROBLEM — M48.02 SPINAL STENOSIS OF CERVICAL REGION: Status: ACTIVE | Noted: 2017-08-05

## 2023-01-11 PROBLEM — F90.9 ADHD (ATTENTION DEFICIT HYPERACTIVITY DISORDER): Status: ACTIVE | Noted: 2017-01-23

## 2023-01-11 PROBLEM — S06.9XAA TBI (TRAUMATIC BRAIN INJURY): Status: ACTIVE | Noted: 2017-01-23

## 2023-01-11 PROBLEM — F43.10 PTSD (POST-TRAUMATIC STRESS DISORDER): Status: ACTIVE | Noted: 2017-01-23

## 2023-01-11 PROBLEM — M23.90 INTERNAL DERANGEMENT OF KNEE: Status: ACTIVE | Noted: 2023-01-11

## 2023-01-11 PROBLEM — K21.9 GASTROESOPHAGEAL REFLUX DISEASE WITHOUT ESOPHAGITIS: Status: ACTIVE | Noted: 2017-02-15

## 2023-01-11 PROBLEM — R68.89 OTHER GENERAL SYMPTOMS: Status: ACTIVE | Noted: 2023-01-11

## 2023-01-11 PROBLEM — H93.19 TINNITUS: Status: ACTIVE | Noted: 2017-01-23

## 2023-01-11 PROBLEM — R00.2 PALPITATIONS: Status: RESOLVED | Noted: 2023-01-04 | Resolved: 2023-01-11

## 2023-01-11 RX ORDER — SILDENAFIL 100 MG/1
100 TABLET, FILM COATED ORAL DAILY PRN
Qty: 20 TABLET | Refills: 0 | Status: SHIPPED | OUTPATIENT
Start: 2023-01-11
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
## 2023-01-19 ENCOUNTER — CONSULT (OUTPATIENT)
Dept: NEUROSURGERY | Facility: CLINIC | Age: 43
End: 2023-01-19

## 2023-01-19 ENCOUNTER — APPOINTMENT (OUTPATIENT)
Dept: LAB | Facility: CLINIC | Age: 43
End: 2023-01-19

## 2023-01-19 VITALS
SYSTOLIC BLOOD PRESSURE: 122 MMHG | BODY MASS INDEX: 36.26 KG/M2 | DIASTOLIC BLOOD PRESSURE: 74 MMHG | HEIGHT: 71 IN | TEMPERATURE: 97.8 F | WEIGHT: 259 LBS | OXYGEN SATURATION: 98 % | HEART RATE: 70 BPM | RESPIRATION RATE: 18 BRPM

## 2023-01-19 DIAGNOSIS — M50.223 HERNIATION OF INTERVERTEBRAL DISC AT C6-C7 LEVEL: ICD-10-CM

## 2023-01-19 DIAGNOSIS — E66.01 OBESITY, MORBID (HCC): Primary | ICD-10-CM

## 2023-01-19 LAB
BUN SERPL-MCNC: 25 MG/DL (ref 5–25)
CREAT SERPL-MCNC: 0.82 MG/DL (ref 0.6–1.3)
GFR SERPL CREATININE-BSD FRML MDRD: 109 ML/MIN/1.73SQ M
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
## 2023-01-20 NOTE — NURSING NOTE
Called patient to complete consult and go over instructions, patient is scheduled on  1/26/23  for diagnostic , myelogram  Verified allergies and no current anticoagulant medication present  Diet, medication instructions (taking own meds prior to test), also went over with patient that as of today with hold any ASA or ASA products till the date of the procedure and need for  was explained  Also went over instructions of location, time and date of procedure, of location and time ambulatory procedure unit  Also reviewed the procedure itself and answered any questions  Explained also post recovery instructions  Patient made aware that he may call if any other questions that may arise to the myself, gave them my contact information  Information was also sent via e-mail to verified address, see message below  Good Morning Mr Dimas Trujillo are scheduled on  1/26/23  @ 1 pm  for diagnostic  myelogram       You are to come @ 11:30 am  to [address] at [address] [place] [address], [address] floor and present to [employer]  They will get you changed for your procedure, update medical information, and draw blood work  A platelet and clotting time are needed the day of the procedure to assure your safety and healing post procedure  You may eat a light breakfast, drink fluids and take all your medications that are prescribed to you  Please do not take any Aspirin and also be cautious of any over the counter medication please check if Aspirin is one of the ingredients (Tylenol, Motrin and Aleve are fine to take)  If not on fluid restrictions, please increase your fluid intake 3 days prior to the procedure  For the procedure you will be on your stomach, we will use an Xray to assist in accessing your cerebral spinal fluid (CSF) once the area in your lower back is cleaned and you get a local anesthetic   We will be inserting IV contrast to your CSF, in order to get the pictures that are needed for your ordering provider you will be inverted towards your head so the contrast can move upward to the needed location  Once the contrast is at the location needed for ideal pictures you will be placed back on to your stretcher and taken over to CT to get the scans needed  Upon completion on the CT you will be sent back to surgical services where you will be monitored for the allotted time usually 1 hour  Upon discharge you will need a ride home so please bring a  for this study  The night of or the day following you may experience soreness at your lower back and a headache, these are normal and expected  Your discharge instructions will be to rest, hydrate with fluids (caffeine helps also) and take over the counter medication such as Tylenol, Motrin or Aleve  Please feel free to call if any other questions or concerns should arise  [address] [place] [address]  Radiology RN  [address] [place] [address]  [phone] (Office)  [phone] (Fax)  [email or web address]

## 2023-01-26 ENCOUNTER — HOSPITAL ENCOUNTER (OUTPATIENT)
Dept: RADIOLOGY | Facility: HOSPITAL | Age: 43
Discharge: HOME | End: 2023-01-26

## 2023-01-26 VITALS
DIASTOLIC BLOOD PRESSURE: 58 MMHG | HEART RATE: 60 BPM | WEIGHT: 259 LBS | RESPIRATION RATE: 16 BRPM | SYSTOLIC BLOOD PRESSURE: 107 MMHG | OXYGEN SATURATION: 99 % | HEIGHT: 71 IN | TEMPERATURE: 97.8 F | BODY MASS INDEX: 36.26 KG/M2

## 2023-01-26 DIAGNOSIS — M50.223 HERNIATION OF INTERVERTEBRAL DISC AT C6-C7 LEVEL: ICD-10-CM

## 2023-01-26 DIAGNOSIS — M54.6 THORACIC SPINE PAIN: ICD-10-CM

## 2023-01-26 LAB
APTT PPP: 26 SECONDS (ref 23–37)
INR PPP: 0.94 (ref 0.84–1.19)
PLATELET # BLD AUTO: 396 THOUSANDS/UL (ref 149–390)
PMV BLD AUTO: 9.2 FL (ref 8.9–12.7)
PROTHROMBIN TIME: 12.8 SECONDS (ref 11.6–14.5)

## 2023-01-26 RX ORDER — LIDOCAINE HYDROCHLORIDE 10 MG/ML
10 INJECTION, SOLUTION EPIDURAL; INFILTRATION; INTRACAUDAL; PERINEURAL
Status: DISCONTINUED | OUTPATIENT
Start: 2023-01-26 | End: 2023-01-27 | Stop reason: HOSPADM

## 2023-01-26 RX ADMIN — IOHEXOL 9 ML: 300 INJECTION, SOLUTION INTRAVENOUS at 14:35

## 2023-01-31 ENCOUNTER — HOSPITAL ENCOUNTER (OUTPATIENT)
Dept: NON INVASIVE DIAGNOSTICS | Facility: CLINIC | Age: 43
Discharge: HOME | End: 2023-01-31

## 2023-01-31 VITALS
HEART RATE: 63 BPM | HEIGHT: 71 IN | WEIGHT: 259 LBS | DIASTOLIC BLOOD PRESSURE: 58 MMHG | BODY MASS INDEX: 36.26 KG/M2 | SYSTOLIC BLOOD PRESSURE: 107 MMHG

## 2023-01-31 DIAGNOSIS — R00.2 PALPITATIONS: ICD-10-CM

## 2023-01-31 DIAGNOSIS — I10 ESSENTIAL (PRIMARY) HYPERTENSION: ICD-10-CM

## 2023-01-31 LAB
AORTIC ROOT: 2.9 CM
ASCENDING AORTA: 3.1 CM
E WAVE DECELERATION TIME: 317 MS
FRACTIONAL SHORTENING: 38 % (ref 28–44)
INTERVENTRICULAR SEPTUM IN DIASTOLE (PARASTERNAL SHORT AXIS VIEW): 1.2 CM
INTERVENTRICULAR SEPTUM: 1.2 CM (ref 0.6–1.1)
IVC: 20 MM
LAAS-AP2: 13.5 CM2
LAAS-AP4: 14.2 CM2
LEFT ATRIUM SIZE: 3.9 CM
LEFT INTERNAL DIMENSION IN SYSTOLE: 3 CM (ref 2.1–4)
LEFT VENTRICULAR INTERNAL DIMENSION IN DIASTOLE: 4.8 CM (ref 3.5–6)
LEFT VENTRICULAR POSTERIOR WALL IN END DIASTOLE: 1.1 CM
LEFT VENTRICULAR STROKE VOLUME: 71 ML
LVSV (TEICH): 71 ML
MV E'TISSUE VEL-SEP: 10 CM/S
MV PEAK A VEL: 0.44 M/S
MV PEAK E VEL: 70 CM/S
RIGHT ATRIUM AREA SYSTOLE A4C: 17.2 CM2
RIGHT VENTRICLE ID DIMENSION: 4.5 CM
SL CV LEFT ATRIUM LENGTH A2C: 4.3 CM
SL CV LV EF: 65
SL CV PED ECHO LEFT VENTRICLE DIASTOLIC VOLUME (MOD BIPLANE) 2D: 107 ML
SL CV PED ECHO LEFT VENTRICLE SYSTOLIC VOLUME (MOD BIPLANE) 2D: 36 ML
TRICUSPID ANNULAR PLANE SYSTOLIC EXCURSION: 2.6 CM

## 2023-02-15 ENCOUNTER — DOCUMENTATION (OUTPATIENT)
Dept: PAIN MEDICINE | Facility: CLINIC | Age: 43
End: 2023-02-15

## 2023-02-15 DIAGNOSIS — M54.2 NECK PAIN: ICD-10-CM

## 2023-02-15 DIAGNOSIS — M54.12 CERVICAL RADICULOPATHY: Primary | ICD-10-CM

## 2023-02-15 RX ORDER — CELECOXIB 200 MG/1
200 CAPSULE ORAL 2 TIMES DAILY PRN
Qty: 60 CAPSULE | Refills: 0 | Status: SHIPPED | OUTPATIENT
Start: 2023-02-15 | End: 2023-04-26

## 2023-02-23 ENCOUNTER — TELEPHONE (OUTPATIENT)
Dept: PAIN MEDICINE | Facility: MEDICAL CENTER | Age: 43
End: 2023-02-23

## 2023-02-23 ENCOUNTER — OFFICE VISIT (OUTPATIENT)
Dept: NEUROSURGERY | Facility: CLINIC | Age: 43
End: 2023-02-23

## 2023-02-23 VITALS
HEART RATE: 85 BPM | HEIGHT: 71 IN | WEIGHT: 259 LBS | DIASTOLIC BLOOD PRESSURE: 93 MMHG | RESPIRATION RATE: 18 BRPM | BODY MASS INDEX: 36.26 KG/M2 | TEMPERATURE: 98.4 F | SYSTOLIC BLOOD PRESSURE: 134 MMHG | OXYGEN SATURATION: 99 %

## 2023-02-23 DIAGNOSIS — M48.02 SPINAL STENOSIS OF CERVICAL REGION: Primary | ICD-10-CM

## 2023-02-23 DIAGNOSIS — M54.2 NECK PAIN: ICD-10-CM

## 2023-02-23 DIAGNOSIS — G89.21 CHRONIC TRAUMATIC PAIN: ICD-10-CM

## 2023-02-23 DIAGNOSIS — M54.12 CERVICAL RADICULOPATHY: ICD-10-CM

## 2023-02-23 RX ORDER — IBUPROFEN 400 MG/1
TABLET ORAL EVERY 8 HOURS PRN
COMMUNITY
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
## 2023-02-23 NOTE — TELEPHONE ENCOUNTER
Caller: Yefri King     Doctor/Office: Dr Yonatan Jacques: [phone]        Patient is requesting a transfer of care for the following reason: Patient would like to go different location  Doctor: Dr Wes Mariee     Would you release patient from your care? Doctor: Dr Tabatah Farrar    Would you take patient on as a patient? Please advise,   Thank you
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
## 2023-02-24 ENCOUNTER — TELEPHONE (OUTPATIENT)
Dept: PAIN MEDICINE | Facility: MEDICAL CENTER | Age: 43
End: 2023-02-24

## 2023-02-24 NOTE — TELEPHONE ENCOUNTER
Caller: Pt     Doctor: Q    Reason for call: Pt said that he is not Dr shopmichelle  Pt doesn't want medications  He said that the injections are helping for at least 2 months  He needs to know what he can do for the pain in between the injections  He feels that Dr Zane Pineda should have referred him to [facility] for his pain  Everything that the [facility] dept contradicts what Dr Zane Pineda has said to him appt his issues  Please have Dr FUCHS view [facility] notes  If injection are the only thing he can get, then that's ok  He will do the injections with no problem  He just wants a Dr to be straight with him  He understands that he will not be pain free  Pain can be managed without narcotics  He just wants the best care please wasn't getting that with Zane Pineda      Call back#: [phone]

## 2023-02-24 NOTE — TELEPHONE ENCOUNTER
Caller: Bruna Rios     Doctor/Office: not spa     Call regarding :  Triage     Call was transferred to: Triage

## 2023-03-14 ENCOUNTER — APPOINTMENT (EMERGENCY)
Dept: RADIOLOGY | Facility: HOSPITAL | Age: 43
End: 2023-03-14

## 2023-03-14 ENCOUNTER — HOSPITAL ENCOUNTER (EMERGENCY)
Facility: HOSPITAL | Age: 43
Discharge: HOME | End: 2023-03-14
Attending: EMERGENCY MEDICINE

## 2023-03-14 VITALS
OXYGEN SATURATION: 98 % | SYSTOLIC BLOOD PRESSURE: 150 MMHG | TEMPERATURE: 98.5 F | HEART RATE: 60 BPM | DIASTOLIC BLOOD PRESSURE: 85 MMHG | RESPIRATION RATE: 13 BRPM

## 2023-03-14 DIAGNOSIS — R53.83 FATIGUE: Primary | ICD-10-CM

## 2023-03-14 LAB
ALBUMIN SERPL BCP-MCNC: 4.7 G/DL (ref 3.5–5)
ALP SERPL-CCNC: 73 U/L (ref 34–104)
ALT SERPL W P-5'-P-CCNC: 41 U/L (ref 7–52)
ANION GAP SERPL CALCULATED.3IONS-SCNC: 8 MMOL/L (ref 4–13)
AST SERPL W P-5'-P-CCNC: 28 U/L (ref 13–39)
BASOPHILS # BLD AUTO: 0.05 THOUSANDS/ÂΜL (ref 0–0.1)
BASOPHILS NFR BLD AUTO: 1 % (ref 0–1)
BILIRUB SERPL-MCNC: 0.4 MG/DL (ref 0.2–1)
BUN SERPL-MCNC: 22 MG/DL (ref 5–25)
CALCIUM SERPL-MCNC: 9.5 MG/DL (ref 8.4–10.2)
CARDIAC TROPONIN I PNL SERPL HS: 3 NG/L
CHLORIDE SERPL-SCNC: 105 MMOL/L (ref 96–108)
CO2 SERPL-SCNC: 26 MMOL/L (ref 21–32)
CREAT SERPL-MCNC: 0.9 MG/DL (ref 0.6–1.3)
EOSINOPHIL # BLD AUTO: 0.25 THOUSAND/ÂΜL (ref 0–0.61)
EOSINOPHIL NFR BLD AUTO: 4 % (ref 0–6)
ERYTHROCYTE [DISTWIDTH] IN BLOOD BY AUTOMATED COUNT: 13.3 % (ref 11.6–15.1)
GFR SERPL CREATININE-BSD FRML MDRD: 105 ML/MIN/1.73SQ M
GLUCOSE SERPL-MCNC: 114 MG/DL (ref 65–140)
HCT VFR BLD AUTO: 42.9 % (ref 36.5–49.3)
HGB BLD-MCNC: 14.6 G/DL (ref 12–17)
IMM GRANULOCYTES # BLD AUTO: 0.01 THOUSAND/UL (ref 0–0.2)
IMM GRANULOCYTES NFR BLD AUTO: 0 % (ref 0–2)
LYMPHOCYTES # BLD AUTO: 1.9 THOUSANDS/ÂΜL (ref 0.6–4.47)
LYMPHOCYTES NFR BLD AUTO: 28 % (ref 14–44)
MCH RBC QN AUTO: 28.2 PG (ref 26.8–34.3)
MCHC RBC AUTO-ENTMCNC: 34 G/DL (ref 31.4–37.4)
MCV RBC AUTO: 83 FL (ref 82–98)
MONOCYTES # BLD AUTO: 0.74 THOUSAND/ÂΜL (ref 0.17–1.22)
MONOCYTES NFR BLD AUTO: 11 % (ref 4–12)
NEUTROPHILS # BLD AUTO: 3.83 THOUSANDS/ÂΜL (ref 1.85–7.62)
NEUTS SEG NFR BLD AUTO: 56 % (ref 43–75)
NRBC BLD AUTO-RTO: 0 /100 WBCS
PLATELET # BLD AUTO: 382 THOUSANDS/UL (ref 149–390)
PMV BLD AUTO: 8.8 FL (ref 8.9–12.7)
POTASSIUM SERPL-SCNC: 3.9 MMOL/L (ref 3.5–5.3)
PROT SERPL-MCNC: 7.5 G/DL (ref 6.4–8.4)
RBC # BLD AUTO: 5.17 MILLION/UL (ref 3.88–5.62)
SODIUM SERPL-SCNC: 139 MMOL/L (ref 135–147)
WBC # BLD AUTO: 6.78 THOUSAND/UL (ref 4.31–10.16)

## 2023-03-14 RX ADMIN — SODIUM CHLORIDE 1000 ML: 0.9 INJECTION, SOLUTION INTRAVENOUS at 19:42

## 2023-03-15 LAB
ATRIAL RATE: 81 BPM
P AXIS: 42 DEGREES
PR INTERVAL: 146 MS
QRS AXIS: 39 DEGREES
QRSD INTERVAL: 98 MS
QT INTERVAL: 374 MS
QTC INTERVAL: 434 MS
T WAVE AXIS: 55 DEGREES
VENTRICULAR RATE: 81 BPM

## 2023-03-29 NOTE — ED PROVIDER NOTES
"History  Chief Complaint   Patient presents with   • Weakness - Generalized     Pt states \"This all started after scuba diving therapy, I've been having lightheadedness spells where I almost pass out  \" Reports diarrhea started at the same time  Started 4 days ago  Had an episode tonight where he had an episode of chest pain which prompted him to come into the ED  45-year-old male presented to emergency department for evaluation of generalized weakness  Patient states that he has generalized weakness, he has been going to submersion therapy, is not having any deep diving or exposure to increased hydrostatic pressures  Has since this time been having intermittent spells of lightheadedness generalized fatigue, intermittent chest pain  The chest pain that he describes as sharp substernal and intermittent, it prompted him to come to the emergency department for evaluation here  Prior to Admission Medications   Prescriptions Last Dose Informant Patient Reported? Taking?    Cholecalciferol (Vitamin D3) 50 MCG (2000 UT) capsule   Yes No   Sig: Vitamin D3 50 mcg (2,000 unit) tablet   celecoxib (CeleBREX) 200 mg capsule   No No   Sig: Take 1 capsule (200 mg total) by mouth 2 (two) times a day as needed for moderate pain   cetirizine (ZyrTEC) 10 mg tablet   Yes No   Sig: Take 10 mg by mouth daily   diazepam (VALIUM) 5 mg tablet   Yes No   famotidine (PEPCID) 20 mg tablet   Yes No   Sig: TAKE ONE TABLET BY MOUTH EVERY DAY AS NEEDED FOR GASTRIC REFLUX   ibuprofen (MOTRIN) 400 mg tablet   Yes No   Sig: Take by mouth every 8 (eight) hours as needed for mild pain   lisinopril-hydrochlorothiazide (PRINZIDE,ZESTORETIC) 10-12 5 MG per tablet   No No   Sig: Take 1 tablet by mouth daily   loratadine (CLARITIN) 10 mg tablet   Yes No   pregabalin (Lyrica) 100 mg capsule   No No   Sig: Take 1 capsule (100 mg total) by mouth 2 (two) times a day   sildenafil (VIAGRA) 100 mg tablet   No No   Sig: Take 1 tablet (100 mg total) " by mouth daily as needed for erectile dysfunction      Facility-Administered Medications: None       Past Medical History:   Diagnosis Date   • Fractures    • Head injury    • Hyperlipidemia    • Hypertension    • PTSD (post-traumatic stress disorder)        Past Surgical History:   Procedure Laterality Date   • ANKLE SURGERY     • ARM DEBRIDEMENT     • COLONOSCOPY     • FL MYELOGRAM 2 OR MORE REGIONS  2023   • HIP SURGERY     • KNEE SURGERY     • SHOULDER SURGERY     • WRIST SURGERY         Family History   Adopted: Yes     I have reviewed and agree with the history as documented  E-Cigarette/Vaping   • E-Cigarette Use Current Some Day User    • Comments vape      E-Cigarette/Vaping Substances   • Nicotine No    • THC No    • CBD Yes    • Flavoring No    • Other No    • Unknown No      Social History     Tobacco Use   • Smoking status: Former     Packs/day: 0 50     Years: 4 00     Pack years: 2 00     Types: Cigarettes     Quit date:      Years since quittin 2   • Smokeless tobacco: Former   Vaping Use   • Vaping Use: Some days   • Substances: CBD   Substance Use Topics   • Alcohol use: Not Currently   • Drug use: No       Review of Systems   Constitutional: Positive for fatigue  Negative for appetite change, chills and fever  HENT: Negative for sneezing and sore throat  Eyes: Negative for visual disturbance  Respiratory: Negative for cough, choking, chest tightness, shortness of breath and wheezing  Cardiovascular: Positive for chest pain  Negative for palpitations  Gastrointestinal: Negative for abdominal pain, constipation, diarrhea, nausea and vomiting  Genitourinary: Negative for difficulty urinating and dysuria  Neurological: Positive for light-headedness  Negative for dizziness, weakness, numbness and headaches  All other systems reviewed and are negative  Physical Exam  Physical Exam  Vitals and nursing note reviewed     Constitutional:       General: He is not in acute distress  Appearance: He is well-developed  He is not diaphoretic  HENT:      Head: Normocephalic and atraumatic  Eyes:      Pupils: Pupils are equal, round, and reactive to light  Neck:      Vascular: No JVD  Trachea: No tracheal deviation  Cardiovascular:      Rate and Rhythm: Normal rate and regular rhythm  Heart sounds: Normal heart sounds  No murmur heard  No friction rub  No gallop  Pulmonary:      Effort: Pulmonary effort is normal  No respiratory distress  Breath sounds: Normal breath sounds  No wheezing or rales  Abdominal:      General: Bowel sounds are normal  There is no distension  Palpations: Abdomen is soft  Tenderness: There is no abdominal tenderness  There is no guarding or rebound  Skin:     General: Skin is warm and dry  Coloration: Skin is not pale  Neurological:      Mental Status: He is alert and oriented to person, place, and time  Cranial Nerves: No cranial nerve deficit  Motor: No abnormal muscle tone     Psychiatric:         Behavior: Behavior normal          Vital Signs  ED Triage Vitals   Temperature Pulse Respirations Blood Pressure SpO2   03/14/23 1912 03/14/23 1912 03/14/23 1912 03/14/23 1912 03/14/23 1912   98 5 °F (36 9 °C) 77 18 (!) 168/104 98 %      Temp Source Heart Rate Source Patient Position - Orthostatic VS BP Location FiO2 (%)   03/14/23 1912 03/14/23 1930 03/14/23 1930 03/14/23 1930 --   Tympanic Monitor Sitting Left arm       Pain Score       --                  Vitals:    03/14/23 2000 03/14/23 2030 03/14/23 2100 03/14/23 2130   BP: 138/84 134/80 (!) 171/82 150/85   Pulse: 69 63 58 60   Patient Position - Orthostatic VS: Sitting Sitting Sitting Sitting         Visual Acuity      ED Medications  Medications   sodium chloride 0 9 % bolus 1,000 mL (0 mL Intravenous Stopped 3/14/23 2130)       Diagnostic Studies  Results Reviewed     Procedure Component Value Units Date/Time    HS Troponin 0hr (reflex protocol) [[id number]]  (Normal) Collected: 03/14/23 1942    Lab Status: Final result Specimen: Blood from Arm, Right Updated: 03/14/23 2014     hs TnI 0hr 3 ng/L     Comprehensive metabolic panel [[id number]] Collected: 03/14/23 1942    Lab Status: Final result Specimen: Blood from Arm, Right Updated: 03/14/23 2007     Sodium 139 mmol/L      Potassium 3 9 mmol/L      Chloride 105 mmol/L      CO2 26 mmol/L      ANION GAP 8 mmol/L      BUN 22 mg/dL      Creatinine 0 90 mg/dL      Glucose 114 mg/dL      Calcium 9 5 mg/dL      AST 28 U/L      ALT 41 U/L      Alkaline Phosphatase 73 U/L      Total Protein 7 5 g/dL      Albumin 4 7 g/dL      Total Bilirubin 0 40 mg/dL      eGFR 105 ml/min/1 73sq m     Narrative:      [place] guidelines for Chronic Kidney Disease (CKD):   •  Stage 1 with normal or high GFR (GFR > 90 mL/min/1 73 square meters)  •  Stage 2 Mild CKD (GFR = 60-89 mL/min/1 73 square meters)  •  Stage 3A Moderate CKD (GFR = 45-59 mL/min/1 73 square meters)  •  Stage 3B Moderate CKD (GFR = 30-44 mL/min/1 73 square meters)  •  Stage 4 Severe CKD (GFR = 15-29 mL/min/1 73 square meters)  •  Stage 5 End Stage CKD (GFR <15 mL/min/1 73 square meters)  Note: GFR calculation is accurate only with a steady state creatinine    CBC and differential [[id number]]  (Abnormal) Collected: 03/14/23 1942    Lab Status: Final result Specimen: Blood from Arm, Right Updated: 03/14/23 1947     WBC 6 78 Thousand/uL      RBC 5 17 Million/uL      Hemoglobin 14 6 g/dL      Hematocrit 42 9 %      MCV 83 fL      MCH 28 2 pg      MCHC 34 0 g/dL      RDW 13 3 %      MPV 8 8 fL      Platelets 812 Thousands/uL      nRBC 0 /100 WBCs      Neutrophils Relative 56 %      Immat GRANS % 0 %      Lymphocytes Relative 28 %      Monocytes Relative 11 %      Eosinophils Relative 4 %      Basophils Relative 1 %      Neutrophils Absolute 3 83 Thousands/µL      Immature Grans Absolute 0 01 Thousand/uL      Lymphocytes Absolute 1 90 Thousands/µL      Monocytes Absolute 0 74 Thousand/µL      Eosinophils Absolute 0 25 Thousand/µL      Basophils Absolute 0 05 Thousands/µL                  XR chest 1 view portable   Final Result by Kulwinder Vines MD (03/15 2421)      No acute cardiopulmonary disease  Findings are stable            Workstation performed: [id number]                    Procedures  Procedures         ED Course                               SBIRT 22yo+    Flowsheet Row Most Recent Value   SBIRT (25 yo +)    In order to provide better care to our patients, we are screening all of our patients for alcohol and drug use  Would it be okay to ask you these screening questions? Yes Filed at: 03/14/2023 2057   Initial Alcohol Screen: US AUDIT-C     1  How often do you have a drink containing alcohol? 0 Filed at: 03/14/2023 2057   2  How many drinks containing alcohol do you have on a typical day you are drinking? 0 Filed at: 03/14/2023 2057   3b  FEMALE Any Age, or MALE 65+: How often do you have 4 or more drinks on one occassion? 0 Filed at: 03/14/2023 2057   Audit-C Score 0 Filed at: 03/14/2023 2057   LUPILLO: How many times in the past year have you    Used an illegal drug or used a prescription medication for non-medical reasons? Never Filed at: 03/14/2023 2057                    Medical Decision Making  61-year-old male with fatigue, chest pain, intermittent lightheadedness, will check labs EKG troponin electrolytes, give fluids, reassess  Work-up overall benign  Patient clinically stable at this time, recommend increasing fluid intake, following up with PCP for further work-up as needed  Amount and/or Complexity of Data Reviewed  Labs: ordered  Radiology: ordered            Disposition  Final diagnoses:   Fatigue     Time reflects when diagnosis was documented in both MDM as applicable and the Disposition within this note     Time User Action Codes Description Comment    3/14/2023  9:20 PM Mo, [facility] [S13 89] Fatigue       ED Disposition     ED Disposition   Discharge    Condition   Stable    Date/Time   Tue Mar 14, 2023  9:20 PM    Comment   Brandie Gaffney discharge to home/self care  Follow-up Information     Follow up With Specialties Details Why [address] Kendal Garay MD Family Medicine, Emergency Medicine   [address] [facility]  [address] [facility]  [phone]            Discharge Medication List as of 3/14/2023  9:20 PM      CONTINUE these medications which have NOT CHANGED    Details   celecoxib (CeleBREX) 200 mg capsule Take 1 capsule (200 mg total) by mouth 2 (two) times a day as needed for moderate pain, Starting Wed 2/15/2023, Normal      cetirizine (ZyrTEC) 10 mg tablet Take 10 mg by mouth daily, Historical Med      Cholecalciferol (Vitamin D3) 50 MCG (2000 UT) capsule Vitamin D3 50 mcg (2,000 unit) tablet, Historical Med      diazepam (VALIUM) 5 mg tablet Starting Wed 10/12/2022, Historical Med      famotidine (PEPCID) 20 mg tablet TAKE ONE TABLET BY MOUTH EVERY DAY AS NEEDED FOR GASTRIC REFLUX, Historical Med      ibuprofen (MOTRIN) 400 mg tablet Take by mouth every 8 (eight) hours as needed for mild pain, Historical Med      lisinopril-hydrochlorothiazide (PRINZIDE,ZESTORETIC) 10-12 5 MG per tablet Take 1 tablet by mouth daily, Starting Wed 1/4/2023, Normal      loratadine (CLARITIN) 10 mg tablet Historical Med      pregabalin (Lyrica) 100 mg capsule Take 1 capsule (100 mg total) by mouth 2 (two) times a day, Starting Tue 12/27/2022, Normal      sildenafil (VIAGRA) 100 mg tablet Take 1 tablet (100 mg total) by mouth daily as needed for erectile dysfunction, Starting Wed 1/11/2023, Normal             No discharge procedures on file      PDMP Review       Value Time User    PDMP Reviewed  Yes 2/23/2023 12:16 PM Chery Yung MD          ED Provider  Electronically Signed by           Howard Hernandez MD  03/28/23 [id number]

## 2023-04-26 ENCOUNTER — OFFICE VISIT (OUTPATIENT)
Dept: PAIN MEDICINE | Facility: CLINIC | Age: 43
End: 2023-04-26

## 2023-04-26 ENCOUNTER — TELEPHONE (OUTPATIENT)
Dept: PAIN MEDICINE | Facility: CLINIC | Age: 43
End: 2023-04-26

## 2023-04-26 VITALS
BODY MASS INDEX: 36.12 KG/M2 | DIASTOLIC BLOOD PRESSURE: 100 MMHG | WEIGHT: 259 LBS | HEART RATE: 76 BPM | SYSTOLIC BLOOD PRESSURE: 175 MMHG

## 2023-04-26 DIAGNOSIS — M54.12 CERVICAL RADICULOPATHY: Primary | ICD-10-CM

## 2023-04-26 DIAGNOSIS — M48.02 CERVICAL SPINAL STENOSIS: ICD-10-CM

## 2023-04-26 PROBLEM — F31.9 BIPOLAR DISORDER, UNSPECIFIED (HCC): Status: RESOLVED | Noted: 2023-01-11 | Resolved: 2023-04-26

## 2023-04-26 RX ORDER — IBUPROFEN 800 MG/1
800 TABLET ORAL 3 TIMES DAILY PRN
Qty: 90 TABLET | Refills: 0 | Status: SHIPPED | OUTPATIENT
Start: 2023-04-26

## 2023-04-26 RX ORDER — PREGABALIN 100 MG/1
100 CAPSULE ORAL 2 TIMES DAILY
Qty: 60 CAPSULE | Refills: 5 | Status: SHIPPED | OUTPATIENT
Start: 2023-04-26

## 2023-04-26 NOTE — PROGRESS NOTES
Assessment:  1  Cervical radiculopathy    2  Cervical spinal stenosis        Plan:  The patient is experiencing ongoing pain in the neck radiating into his right greater than left arm secondary to underlying stenosis  At this time, I discussed repeating cervical epidural steroid injection for further efficacy  He would like to proceed and will be scheduled fluoroscopic guidance  In addition, I will order EMG testing of the bilateral upper extremities to evaluate for ongoing radiculopathy  Since Lyrica and ibuprofen are helpful for symptoms, I will continue him on these medications and have sent refills over to his pharmacy  I did also discuss getting a second opinion regarding cervical spine surgery for definitive treatment but we will hold off until we have the results of the EMG testing  Complete risks and benefits including bleeding, infection, tissue reaction, nerve injury and allergic reaction were discussed  The approach was demonstrated using models and literature was provided  Verbal and written consent was obtained  My impressions and treatment recommendations were discussed in detail with the patient who verbalized understanding and had no further questions  Discharge instructions were provided  I personally saw and examined the patient and I agree with the above discussed plan of care  Orders Placed This Encounter   Procedures    FL spine and pain procedure     Standing Status:   Future     Standing Expiration Date:   4/26/2027     Order Specific Question:   Reason for Exam:     Answer:   DAMI     Order Specific Question:   Anticoagulant hold needed? Answer:   No    EMG 2 Limb Upper Extremity     Standing Status:   Future     Standing Expiration Date:   4/26/2024     Scheduling Instructions:      Bilateral arm paresthesias worse on the right     Order Specific Question:   Possible Diagnosis:      Answer:   cervical neuropathy     New Medications Ordered This Visit   Medications  ibuprofen (MOTRIN) 800 mg tablet     Sig: Take 1 tablet (800 mg total) by mouth 3 (three) times a day as needed for mild pain     Dispense:  90 tablet     Refill:  0    pregabalin (Lyrica) 100 mg capsule     Sig: Take 1 capsule (100 mg total) by mouth 2 (two) times a day     Dispense:  60 capsule     Refill:  5       History of Present Illness:  Maranda Le is a 44 y o  male who presents for a follow up office visit in regards to Neck Pain and Shoulder Pain (bilateral)  The patient was previously seeing Dr Madyson Sanderson and care is now being transferred to me  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  He has been diagnosed with spinal stenosis in the cervical spine and undergone 3 cervical epidural steroid injections which have provided typically about 3 months relief  However, he persists with pain in the neck worse on the right that travels into the arm causing weakness  Symptoms are aggravated with head position and can be debilitating rated at x8/10 on a numeric rating scale  He is currently on Lyrica and ibuprofen which are helpful  He did see Dr Lydia Kaiser for consultation regards to surgical treatment options who told him he is not currently a surgical candidate based on his current symptoms  I have personally reviewed and/or updated the patient's past medical history, past surgical history, family history, social history, current medications, allergies, and vital signs today  Review of Systems   Respiratory: Negative for shortness of breath  Cardiovascular: Negative for chest pain  Gastrointestinal: Negative for constipation, diarrhea, nausea and vomiting  Musculoskeletal: Positive for neck pain and neck stiffness  Negative for arthralgias, gait problem, joint swelling and myalgias  Skin: Negative for rash  Neurological: Positive for weakness, numbness and headaches  Negative for dizziness and seizures     All other systems reviewed and are negative        Patient Active Problem List   Diagnosis    Degenerative tear of acetabular labrum of right hip    Essential (primary) hypertension    ADHD (attention deficit hyperactivity disorder)    Cervical radiculitis    Gastroesophageal reflux disease without esophagitis    Hearing loss    Herniation of intervertebral disc at C6-C7 level    Injury of radial nerve    Internal derangement of knee    Obesity    Spinal stenosis of cervical region    PTSD (post-traumatic stress disorder)    [facility] [address] [facility]    Tinnitus    Benign essential hypertension    Obesity, morbid ([facility] [address] [facility]    Cervical radiculopathy    Chronic traumatic pain    Neck pain       Past Medical History:   Diagnosis Date    Fractures     Head injury     Hyperlipidemia     Hypertension     PTSD (post-traumatic stress disorder)        Past Surgical History:   Procedure Laterality Date    ANKLE SURGERY      ARM DEBRIDEMENT      COLONOSCOPY      FL MYELOGRAM 2 OR MORE REGIONS  2023    HIP SURGERY      KNEE SURGERY      SHOULDER SURGERY      WRIST SURGERY         Family History   Adopted: Yes       Social History     Occupational History    Not on file   Tobacco Use    Smoking status: Former     Packs/day: 0 50     Years: 4 00     Pack years: 2 00     Types: Cigarettes     Quit date:      Years since quittin 3    Smokeless tobacco: Former   Vaping Use    Vaping Use: Some days    Substances: CBD   Substance and Sexual Activity    Alcohol use: Not Currently    Drug use: No    Sexual activity: Not on file       Current Outpatient Medications on File Prior to Visit   Medication Sig    cetirizine (ZyrTEC) 10 mg tablet Take 10 mg by mouth daily    Cholecalciferol (Vitamin D3) 50 MCG (2000 UT) capsule Vitamin D3 50 mcg (2,000 unit) tablet    famotidine (PEPCID) 20 mg tablet TAKE ONE TABLET BY MOUTH EVERY DAY AS NEEDED FOR GASTRIC REFLUX    lisinopril-hydrochlorothiazide (PRINZIDE,ZESTORETIC) 10-12 5 MG per tablet Take 1 tablet by mouth daily    loratadine (CLARITIN) 10 mg tablet     sildenafil (VIAGRA) 100 mg tablet Take 1 tablet (100 mg total) by mouth daily as needed for erectile dysfunction     No current facility-administered medications on file prior to visit  No Known Allergies    Physical Exam:    BP (!) 175/100   Pulse 76   Wt 117 kg (259 lb)   BMI 36 12 kg/m²     Constitutional:normal, well developed, well nourished, alert, in no distress and non-toxic and no overt pain behavior  and obese  Eyes:anicteric  HEENT:grossly intact  Neck:supple, symmetric, trachea midline and no masses   Pulmonary:even and unlabored  Cardiovascular:No edema or pitting edema present  Skin:Normal without rashes or lesions and well hydrated  Psychiatric:Mood and affect appropriate  Neurologic:Cranial Nerves II-XII grossly intact  Musculoskeletal:normal     Cervical Spine Exam  Appearance:  Normal lordosis  Palpation/Tenderness:  right cervical paraspinal tenderness  right trapezium tenderness  Range of Motion:  Flexion:  Minimally limited  with pain  Extension:  Moderately limited  with pain  Rotation - Left:  Minimally limited  with pain  Rotation - Right:  Moderately limited  with pain  Motor Strength:  Left Arm Flexion  5/5  Left Arm Extension  5/5  Right Arm Flexion  5/5  Right Arm Extension  4/5  Left Wrist Flexion  5/5  Left Wrist Extension  5/5  Left Finger Abduction  5/5  Right Finger Abduction  5/5  Left Pincer Grasp  5/5  Right Pincer Grasp  5/5  Reflexes:  Left Biceps:  1+   Right Biceps:  1+   Left Triceps:  1+   Right Triceps:  1+     Imaging    CERVICAL MYELOGRAM (1/26/2023)     INDICATION: Pain, radiculopathy     COMPARISON: CT cervical spine 7/21/2022        TECHNIQUE:    CT examination of the cervical spine was performed without intravenous contrast   Contiguous axial images were obtained    Sagittal and coronal reconstructions were performed        Radiation dose length product (DLP) for this visit:  519 mGy-cm   This examination, like all CT scans performed in the [facility], was performed utilizing techniques to minimize radiation dose exposure, including the use of iterative   reconstruction and automated exposure control        The Lumbar puncture and the injection of contrast were performed by the PA and are dictated separately and described in the Myelogram report      IMAGE QUALITY:  Diagnostic      FINDINGS:     POSTMYELOGRAPHIC PLAIN FILMS:  Smooth transition of intrathecal contrast noted normal radiographic block      POST MYELOGRAPHIC CT     ALIGNMENT:  There is preserved normal cervical lordosis  Mild dextroscoliotic curvature      VERTEBRAE:  Vertebrae heights are preserved  No fracture      DEGENERATIVE CHANGES     C2-C3:  No disc bulge  No canal or foraminal stenosis      C3-C4: Mild disc bulge  Mild uncovertebral spurring  Mild canal stenosis  Mild left foraminal narrowing      C4-C5: No significant disc bulge  No canal or significant foraminal stenosis      C5-C6: Disc osteophyte complex and uncovertebral spurring resulting in left lateral recess narrowing and severe left foraminal narrowing  There is mild canal stenosis  Moderate right foraminal narrowing      C6-C7: Disc osteophyte complex resulting in mild canal stenosis  Right greater than left uncovertebral spurring resulting in moderate to severe right and mild left foraminal narrowing      C7-T1: No disc bulge    No canal or foraminal stenosis      UPPER THORACIC DISC SPACES:  No significant degenerative change      PREVERTEBRAL AND PARASPINAL SOFT TISSUES: Normal      OTHER FINDINGS: Partially imaged mild paranasal sinus disease

## 2023-05-05 ENCOUNTER — HOSPITAL ENCOUNTER (OUTPATIENT)
Dept: RADIOLOGY | Facility: CLINIC | Age: 43
End: 2023-05-05

## 2023-05-05 VITALS
OXYGEN SATURATION: 96 % | TEMPERATURE: 98.3 F | RESPIRATION RATE: 18 BRPM | DIASTOLIC BLOOD PRESSURE: 76 MMHG | HEART RATE: 74 BPM | SYSTOLIC BLOOD PRESSURE: 131 MMHG

## 2023-05-05 DIAGNOSIS — M54.12 CERVICAL RADICULOPATHY: ICD-10-CM

## 2023-05-05 RX ORDER — METHYLPREDNISOLONE ACETATE 80 MG/ML
80 INJECTION, SUSPENSION INTRA-ARTICULAR; INTRALESIONAL; INTRAMUSCULAR; PARENTERAL; SOFT TISSUE ONCE
Status: COMPLETED | OUTPATIENT
Start: 2023-05-05 | End: 2023-05-05

## 2023-05-05 RX ADMIN — METHYLPREDNISOLONE ACETATE 80 MG: 80 INJECTION, SUSPENSION INTRA-ARTICULAR; INTRALESIONAL; INTRAMUSCULAR; PARENTERAL; SOFT TISSUE at 11:05

## 2023-05-05 RX ADMIN — IOHEXOL 1 ML: 300 INJECTION, SOLUTION INTRAVENOUS at 11:04

## 2023-05-05 NOTE — DISCHARGE INSTR - LAB
Epidural Steroid Injection   WHAT YOU NEED TO KNOW:   An epidural steroid injection (OLE) is a procedure to inject steroid medicine into the epidural space  The epidural space is between your spinal cord and vertebrae  Steroids reduce inflammation and fluid buildup in your spine that may be causing pain  You may be given pain medicine along with the steroids  ACTIVITY  Do not drive or operate machinery today  No strenuous activity today - bending, lifting, etc   You may resume normal activites starting tomorrow - start slowly and as tolerated  You may shower today, but no tub baths or hot tubs  You may have numbness for several hours from the local anesthetic  Please use caution and common sense, especially with weight-bearing activities  CARE OF THE INJECTION SITE  If you have soreness or pain, apply ice to the area today (20 minutes on/20 minutes off)  Starting tomorrow, you may use warm, moist heat or ice if needed  You may have an increase or change in your discomfort for 36-48 hours after your treatment  Apply ice and continue with any pain medication you have been prescribed  Notify the [facility] if you have any of the following: redness, drainage, swelling, headache, stiff neck or fever above 100°F     SPECIAL INSTRUCTIONS  Our office will contact you in approximately 7 days for a progress report  MEDICATIONS  Continue to take all routine medications  Our office may have instructed you to hold some medications  As no general anesthesia was used in today's procedure, you should not experience any side effects related to anesthesia  If you are diabetic, the steroids used in today's injection may temporarily increase your blood sugar levels after the first few days after your injection  Please keep a close eye on your sugars and alert the doctor who manages your diabetes if your sugars are significantly high from your baseline or you are symptomatic       If you have a problem specifically related to your procedure, please call our office at [phone]  Problems not related to your procedure should be directed to your primary care physician

## 2023-05-05 NOTE — H&P
History of Present Illness:  The patient is a 44 y o  male who presents with complaints of neck and arm pain is here today for cervical epidural steroid injection    Past Medical History:   Diagnosis Date    Fractures     Head injury     Hyperlipidemia     Hypertension     PTSD (post-traumatic stress disorder)        Past Surgical History:   Procedure Laterality Date    ANKLE SURGERY      ARM DEBRIDEMENT      COLONOSCOPY      FL MYELOGRAM 2 OR MORE REGIONS  1/26/2023    HIP SURGERY      KNEE SURGERY      SHOULDER SURGERY      WRIST SURGERY           Current Outpatient Medications:     cetirizine (ZyrTEC) 10 mg tablet, Take 10 mg by mouth daily, Disp: , Rfl:     Cholecalciferol (Vitamin D3) 50 MCG (2000 UT) capsule, Vitamin D3 50 mcg (2,000 unit) tablet, Disp: , Rfl:     famotidine (PEPCID) 20 mg tablet, TAKE ONE TABLET BY MOUTH EVERY DAY AS NEEDED FOR GASTRIC REFLUX, Disp: , Rfl:     ibuprofen (MOTRIN) 800 mg tablet, Take 1 tablet (800 mg total) by mouth 3 (three) times a day as needed for mild pain, Disp: 90 tablet, Rfl: 0    lisinopril-hydrochlorothiazide (PRINZIDE,ZESTORETIC) 10-12 5 MG per tablet, Take 1 tablet by mouth daily, Disp: 90 tablet, Rfl: 5    loratadine (CLARITIN) 10 mg tablet, , Disp: , Rfl:     pregabalin (Lyrica) 100 mg capsule, Take 1 capsule (100 mg total) by mouth 2 (two) times a day, Disp: 60 capsule, Rfl: 5    sildenafil (VIAGRA) 100 mg tablet, Take 1 tablet (100 mg total) by mouth daily as needed for erectile dysfunction, Disp: 20 tablet, Rfl: 0    Current Facility-Administered Medications:     iohexol (OMNIPAQUE) 300 mg/mL injection 1 mL, 1 mL, Epidural, Once, Lucio Alex MD    methylPREDNISolone acetate (DEPO-MEDROL) injection 80 mg, 80 mg, Epidural, Once, Lucio Aelx MD    No Known Allergies    Physical Exam:   Vitals:    05/05/23 1050   BP: 137/87   Pulse: 81   Resp: 18   Temp: 98 3 °F (36 8 °C)   SpO2: 96%     General: Awake, Alert, Oriented x 3, Mood and affect appropriate  Respiratory: Respirations even and unlabored  Cardiovascular: Peripheral pulses intact; no edema  Musculoskeletal Exam: Neck pain    ASA Score: 3    Patient/Chart Verification  Patient ID Verified: Verbal  ID Band Applied: No  Consents Confirmed: Procedural, To be obtained in the Pre-Procedure area  H&P( within 30 days) Verified: To be obtained in the Pre-Procedure area  Allergies Reviewed: Yes  Anticoag/NSAID held?: No (denies blood thinners, states no nsaids in last 4 days)  Currently on antibiotics?: No    Assessment:   1   Cervical radiculopathy        Plan: DAMI

## 2023-05-12 ENCOUNTER — TELEPHONE (OUTPATIENT)
Dept: PAIN MEDICINE | Facility: CLINIC | Age: 43
End: 2023-05-12

## 2023-05-16 NOTE — TELEPHONE ENCOUNTER
Caller: Sheldon Greene  Doctor/office: Selin CLARK#: [phone]    % of improvement: 10%  Pain Scale (1-10): 5/10

## 2023-05-18 ENCOUNTER — HOSPITAL ENCOUNTER (OUTPATIENT)
Dept: NEUROLOGY | Facility: HOSPITAL | Age: 43
End: 2023-05-18

## 2023-05-18 ENCOUNTER — TELEPHONE (OUTPATIENT)
Dept: PAIN MEDICINE | Facility: CLINIC | Age: 43
End: 2023-05-18

## 2023-05-18 DIAGNOSIS — M54.12 CERVICAL RADICULOPATHY: ICD-10-CM

## 2023-05-19 NOTE — TELEPHONE ENCOUNTER
LMOM to c/b, c/b# and OH given  Nursing Note by Rayne Llanos RN at 10/21/18 10:43 AM     Author:  Rayne Llanos RN Service:  (none) Author Type:  Registered Nurse     Filed:  10/21/18 10:43 AM Encounter Date:  10/21/2018 Status:  Signed     :  Rayne Llanos RN (Registered Nurse)            Patient brought to exam room.  Electronically Signed by:    Rayne Llanos RN , 10/21/2018  Last visit with [facility] was on No match found  Last visit with [facility] was on 03/23/2018 at  7:55 AM in IMMEDIATE CARE SEQ  Match done based on reference date of today 10/21/18  Name and birthdate verified.  Dong Fraser was offered treatment for pain control:[HK1.1T] Yes.  Patient refused comfort measures to reduce pain.[HK1.1M]    Last visit with [facility] CLIN[facility] was on No match found  Last visit with [facility] was on 03/23/2018 at  7:55 AM in IMMEDIATE CARE SEQ  Match done based on reference date of today 10/21/18[HK1.1T]      Flu vaccine given in the patient's right deltoid.  No reaction seen at this time.[HK1.1M]      Revision History        User Key Date/Time User Provider Type Action    > HK1.1 10/21/18 10:43 AM Rayne Llanos RN Registered Nurse Sign    M - Manual, T - Template

## 2023-05-19 NOTE — TELEPHONE ENCOUNTER
Please let patient know that the EMG showed mild carpel tunnel and a chronic cervical radiculopathy but no active radiculopathy  Please get update on how he is doing

## 2023-05-31 ENCOUNTER — HOSPITAL ENCOUNTER (EMERGENCY)
Facility: HOSPITAL | Age: 43
Discharge: HOME | End: 2023-05-31
Attending: EMERGENCY MEDICINE
Payer: MEDICARE

## 2023-05-31 VITALS
SYSTOLIC BLOOD PRESSURE: 128 MMHG | DIASTOLIC BLOOD PRESSURE: 66 MMHG | OXYGEN SATURATION: 99 % | HEART RATE: 82 BPM | TEMPERATURE: 97.7 F | RESPIRATION RATE: 17 BRPM

## 2023-05-31 DIAGNOSIS — R20.2 PARESTHESIA: Primary | ICD-10-CM

## 2023-05-31 PROCEDURE — 99283 EMERGENCY DEPT VISIT LOW MDM: CPT

## 2023-05-31 RX ORDER — OXYCODONE HYDROCHLORIDE 5 MG/1
5 TABLET ORAL ONCE
Status: COMPLETED | OUTPATIENT
Start: 2023-05-31 | End: 2023-05-31

## 2023-05-31 RX ORDER — OXYCODONE HYDROCHLORIDE 5 MG/1
5 TABLET ORAL EVERY 4 HOURS PRN
Qty: 10 TABLET | Refills: 0 | Status: SHIPPED | OUTPATIENT
Start: 2023-05-31 | End: 2023-06-10

## 2023-05-31 RX ADMIN — OXYCODONE HYDROCHLORIDE 5 MG: 5 TABLET ORAL at 12:48

## 2023-06-03 NOTE — ED PROVIDER NOTES
"History  Chief Complaint   Patient presents with   • Arm Pain     Pt arrives c/o L axillary pain into abd  Had spinal injection 3 wks ago for R sided pain and sent in by spine doc for this pain now  56-year-old male presenting today with left axillary pain that goes into his abdomen  Patient states that he does have a history of this pain on the right side and recently had a spinal injection about 3 weeks ago  Patient states for the last 3 weeks he has had this pain gradually increasing but now he cannot \"take it anymore\"  Patient called his pain management doctor which stated that if the pain is really that severe he needs to proceed to the ER for acute management  Patient states that it is specifically in the tricep area and anterior pectoralis  Patient states severe stabbing pain whenever he touches the affected skin  States that it does feel better when he elevates his elbow above his head  Denies any nausea, vomiting, fevers, chills, chest pain, shortness of breath  Prior to Admission Medications   Prescriptions Last Dose Informant Patient Reported? Taking?    Cholecalciferol (Vitamin D3) 50 MCG (2000 UT) capsule  Self Yes No   Sig: Vitamin D3 50 mcg (2,000 unit) tablet   cetirizine (ZyrTEC) 10 mg tablet  Self Yes No   Sig: Take 10 mg by mouth daily   famotidine (PEPCID) 20 mg tablet  Self Yes No   Sig: TAKE ONE TABLET BY MOUTH EVERY DAY AS NEEDED FOR GASTRIC REFLUX   ibuprofen (MOTRIN) 800 mg tablet   No No   Sig: Take 1 tablet (800 mg total) by mouth 3 (three) times a day as needed for mild pain   lisinopril-hydrochlorothiazide (PRINZIDE,ZESTORETIC) 10-12 5 MG per tablet   No No   Sig: Take 1 tablet by mouth daily   loratadine (CLARITIN) 10 mg tablet  Self Yes No   methylPREDNISolone 4 MG tablet therapy pack   No No   Sig: Use as directed on package   pregabalin (Lyrica) 100 mg capsule   No No   Sig: Take 1 capsule (100 mg total) by mouth 2 (two) times a day   sildenafil (VIAGRA) 100 mg " tablet   No No   Sig: Take 1 tablet (100 mg total) by mouth daily as needed for erectile dysfunction      Facility-Administered Medications: None       Past Medical History:   Diagnosis Date   • Fractures    • Head injury    • Hyperlipidemia    • Hypertension    • PTSD (post-traumatic stress disorder)        Past Surgical History:   Procedure Laterality Date   • ANKLE SURGERY     • ARM DEBRIDEMENT     • COLONOSCOPY     • FL MYELOGRAM 2 OR MORE REGIONS  2023   • HIP SURGERY     • KNEE SURGERY     • SHOULDER SURGERY     • WRIST SURGERY         Family History   Adopted: Yes     I have reviewed and agree with the history as documented  E-Cigarette/Vaping   • E-Cigarette Use Current Some Day User    • Comments vape      E-Cigarette/Vaping Substances   • Nicotine No    • THC No    • CBD Yes    • Flavoring No    • Other No    • Unknown No      Social History     Tobacco Use   • Smoking status: Former     Packs/day: 0 50     Years: 4 00     Total pack years: 2 00     Types: Cigarettes     Quit date:      Years since quittin 4   • Smokeless tobacco: Former   Vaping Use   • Vaping Use: Some days   • Substances: CBD   Substance Use Topics   • Alcohol use: Not Currently   • Drug use: No       Review of Systems   Constitutional: Negative for chills and fever  HENT: Negative for ear pain and sore throat  Eyes: Negative for pain and visual disturbance  Respiratory: Negative for apnea, cough, choking, chest tightness, shortness of breath, wheezing and stridor  Cardiovascular: Negative for chest pain and palpitations  Gastrointestinal: Negative for abdominal pain, constipation, diarrhea, nausea and vomiting  Genitourinary: Negative for dysuria and hematuria  Musculoskeletal: Positive for arthralgias  Negative for back pain, gait problem, joint swelling, myalgias, neck pain and neck stiffness  Skin: Negative for color change and rash     Neurological: Negative for dizziness, seizures, syncope, weakness, light-headedness and headaches  All other systems reviewed and are negative  Physical Exam  Physical Exam  Vitals and nursing note reviewed  Constitutional:       General: He is not in acute distress  Appearance: He is well-developed  HENT:      Head: Normocephalic and atraumatic  Eyes:      Conjunctiva/sclera: Conjunctivae normal    Cardiovascular:      Rate and Rhythm: Normal rate and regular rhythm  Pulses: Normal pulses  Heart sounds: Normal heart sounds  No murmur heard  No friction rub  No gallop  Pulmonary:      Effort: Pulmonary effort is normal  No respiratory distress  Breath sounds: Normal breath sounds  No stridor  No wheezing, rhonchi or rales  Chest:      Chest wall: No tenderness  Abdominal:      Palpations: Abdomen is soft  Tenderness: There is no abdominal tenderness  Musculoskeletal:         General: Tenderness (to tricep left ) present  No swelling, deformity or signs of injury  Cervical back: Neck supple  Right lower leg: No edema  Left lower leg: No edema  Skin:     General: Skin is warm and dry  Capillary Refill: Capillary refill takes less than 2 seconds  Neurological:      Mental Status: He is alert     Psychiatric:         Mood and Affect: Mood normal          Vital Signs  ED Triage Vitals [05/31/23 1216]   Temperature Pulse Respirations Blood Pressure SpO2   97 7 °F (36 5 °C) 82 17 128/66 99 %      Temp Source Heart Rate Source Patient Position - Orthostatic VS BP Location FiO2 (%)   Oral -- -- -- --      Pain Score       8           Vitals:    05/31/23 1216   BP: 128/66   Pulse: 82         Visual Acuity      ED Medications  Medications   oxyCODONE (ROXICODONE) IR tablet 5 mg (5 mg Oral Given 5/31/23 1248)       Diagnostic Studies  Results Reviewed     None                 No orders to display              Procedures  Procedures         ED Course                               SBIRT 22yo+    Flowsheet Row Most Recent Value   Initial Alcohol Screen: US AUDIT-C     1  How often do you have a drink containing alcohol? 0 Filed at: 05/31/2023 1227   2  How many drinks containing alcohol do you have on a typical day you are drinking? 0 Filed at: 05/31/2023 1227   3a  Male UNDER 65: How often do you have five or more drinks on one occasion? 0 Filed at: 05/31/2023 1227   Audit-C Score 0 Filed at: 05/31/2023 1227   LUPILLO: How many times in the past year have you    Used an illegal drug or used a prescription medication for non-medical reasons? Never Filed at: 05/31/2023 1227                    Medical Decision Making  50-year-old male presenting today with T1 nerve root hyperesthesia  Patient has history of the same  Was informed by his pain management doctor that she be evaluated in the ED due to acute pain  Patient's pain management doctor was contacted and states that he would try to change his medication to get him in sooner for an additional injection in the left side  Patient does have a history of nerve injury particularly on the right but not worse than the left  No acute injuries, no deformities, no range of motion loss, no numbness or tingling  Oxycodone for pain  Patient states that he feels better after this  Will prescribe small amount of oxycodone and have patient follow-up with pain management doctor for further evaluation and treatment  Patient is on discharge well-appearing in no acute distress all questions answered  Patient's vitals, lab/imaging results, diagnosis, and treatment plan were discussed with the patient  All new/changed medications were discussed with patient, specifically, route of administration, how often and when to take, and where they can be picked up  Strict return precautions as well as close follow up with PCP was discussed with the patient and the patient was agreeable to my recommendations  Patient verbally acknowledged understanding of the above communications   All labs "reviewed and utilized in the medical decision making process (if labs were ordered)  Portions of the record may have been created with voice recognition software   Occasional wrong word or \"sound a like\" substitutions may have occurred due to the inherent limitations of voice recognition software   Read the chart carefully and recognize, using context, where substitutions have occurred  Risk  Prescription drug management  Disposition  Final diagnoses:   Paresthesia     Time reflects when diagnosis was documented in both MDM as applicable and the Disposition within this note     Time User Action Codes Description Comment    5/31/2023  1:29 PM aPt Presume Add [R20 2] Paresthesia       ED Disposition     ED Disposition   Discharge    Condition   Stable    Date/Time   Wed May 31, 2023  1:28 PM    Comment   Irven Angelucci discharge to home/self care                 Follow-up Information     Follow up With Specialties Details Why Contact Info Additional [address] Emergency Department Emergency Medicine Go to  If symptoms worsen [address] Emergency Department, [address], [place], [place], [address]          Discharge Medication List as of 5/31/2023  1:30 PM      START taking these medications    Details   oxyCODONE (Roxicodone) 5 immediate release tablet Take 1 tablet (5 mg total) by mouth every 4 (four) hours as needed for moderate pain for up to 10 days Max Daily Amount: 30 mg, Starting Wed 5/31/2023, Until Sat 6/10/2023 at 2359, Normal         CONTINUE these medications which have NOT CHANGED    Details   cetirizine (ZyrTEC) 10 mg tablet Take 10 mg by mouth daily, Historical Med      Cholecalciferol (Vitamin D3) 50 MCG (2000 UT) capsule Vitamin D3 50 mcg (2,000 unit) tablet, Historical Med      famotidine (PEPCID) 20 mg tablet TAKE ONE TABLET BY MOUTH EVERY DAY AS " NEEDED FOR GASTRIC REFLUX, Historical Med      ibuprofen (MOTRIN) 800 mg tablet Take 1 tablet (800 mg total) by mouth 3 (three) times a day as needed for mild pain, Starting Wed 4/26/2023, Normal      lisinopril-hydrochlorothiazide (PRINZIDE,ZESTORETIC) 10-12 5 MG per tablet Take 1 tablet by mouth daily, Starting Wed 1/4/2023, Normal      loratadine (CLARITIN) 10 mg tablet Historical Med      methylPREDNISolone 4 MG tablet therapy pack Use as directed on package, Normal      pregabalin (Lyrica) 100 mg capsule Take 1 capsule (100 mg total) by mouth 2 (two) times a day, Starting Wed 4/26/2023, Normal      sildenafil (VIAGRA) 100 mg tablet Take 1 tablet (100 mg total) by mouth daily as needed for erectile dysfunction, Starting Wed 1/11/2023, Normal             No discharge procedures on file      PDMP Review       Value Time User    PDMP Reviewed  Yes 4/26/2023  8:07 AM Rock Johnston MD          ED Provider  Electronically Signed by           Citlali Padilla PA-C  06/03/23 7868

## 2023-06-14 ENCOUNTER — TELEPHONE (OUTPATIENT)
Dept: PAIN MEDICINE | Facility: CLINIC | Age: 43
End: 2023-06-14

## 2023-06-14 ENCOUNTER — OFFICE VISIT (OUTPATIENT)
Dept: FAMILY MEDICINE CLINIC | Facility: CLINIC | Age: 43
End: 2023-06-14
Payer: MEDICARE

## 2023-06-14 VITALS
SYSTOLIC BLOOD PRESSURE: 168 MMHG | HEIGHT: 71 IN | TEMPERATURE: 98.3 F | DIASTOLIC BLOOD PRESSURE: 102 MMHG | WEIGHT: 248.2 LBS | OXYGEN SATURATION: 97 % | HEART RATE: 93 BPM | BODY MASS INDEX: 34.75 KG/M2

## 2023-06-14 DIAGNOSIS — M50.223 HERNIATION OF INTERVERTEBRAL DISC AT C6-C7 LEVEL: ICD-10-CM

## 2023-06-14 DIAGNOSIS — M54.12 CERVICAL RADICULITIS: Primary | ICD-10-CM

## 2023-06-14 DIAGNOSIS — M54.12 CERVICAL RADICULOPATHY: ICD-10-CM

## 2023-06-14 PROCEDURE — 96372 THER/PROPH/DIAG INJ SC/IM: CPT

## 2023-06-14 PROCEDURE — 99213 OFFICE O/P EST LOW 20 MIN: CPT | Performed by: FAMILY MEDICINE

## 2023-06-14 RX ORDER — ALPRAZOLAM 0.5 MG/1
0.5 TABLET ORAL DAILY
COMMUNITY

## 2023-06-14 RX ORDER — SERTRALINE HYDROCHLORIDE 25 MG/1
25 TABLET, FILM COATED ORAL DAILY
COMMUNITY

## 2023-06-14 RX ORDER — TESTOSTERONE CYPIONATE 200 MG/ML
8 INJECTION INTRAMUSCULAR ONCE
Status: COMPLETED | OUTPATIENT
Start: 2023-06-14 | End: 2023-06-14

## 2023-06-14 RX ORDER — KETOROLAC TROMETHAMINE 30 MG/ML
60 INJECTION, SOLUTION INTRAMUSCULAR; INTRAVENOUS ONCE
Status: COMPLETED | OUTPATIENT
Start: 2023-06-14 | End: 2023-06-14

## 2023-06-14 RX ORDER — ACETAMINOPHEN AND CODEINE PHOSPHATE 300; 30 MG/1; MG/1
1 TABLET ORAL EVERY 4 HOURS PRN
Qty: 30 TABLET | Refills: 0 | Status: SHIPPED | OUTPATIENT
Start: 2023-06-14 | End: 2023-06-28

## 2023-06-14 RX ORDER — OXYCODONE HYDROCHLORIDE 5 MG/1
5 TABLET ORAL EVERY 4 HOURS PRN
COMMUNITY

## 2023-06-14 RX ADMIN — KETOROLAC TROMETHAMINE 60 MG: 30 INJECTION, SOLUTION INTRAMUSCULAR; INTRAVENOUS at 10:13

## 2023-06-14 RX ADMIN — TESTOSTERONE CYPIONATE 8 MG: 200 INJECTION INTRAMUSCULAR at 10:13

## 2023-06-14 NOTE — TELEPHONE ENCOUNTER
Patient has been scheduled with RM for 7/10 in [place]  Added to wait list  Patient informed and patient appreciative of the calls

## 2023-06-14 NOTE — TELEPHONE ENCOUNTER
Received call from patient PCP and patient requesting a JACINTO from Dr Rommel Boyer to Dr Fadumo Boyer  Will you release patient from your care? Please advise  Dr Fadumo Walden,   Will you accept this patient? Please advise         Thank joyce Esparza

## 2023-06-14 NOTE — ASSESSMENT & PLAN NOTE
Today we were able to make sooner PM appt with Dr Yonis Trucios  Will keep lyrica dosing the same as it was very recently changed  Small Rx for T3's with instructions on use, misuse, potential SEs  Continue ibu 800mg TID  PT order  F/u prn

## 2023-06-14 NOTE — PROGRESS NOTES
Name: Mari Devine      : 1980      MRN: [id number]  Encounter Provider: Anderson Amaya DO  Encounter Date: 2023   Encounter department: [address] [place] [address] PRIMARY CARE    Assessment & Plan     1  Cervical radiculitis  -     acetaminophen-codeine (TYLENOL/CODEINE #3) 300-30 MG per tablet; Take 1 tablet by mouth every 4 (four) hours as needed for moderate pain for up to 14 days  -     ketorolac (TORADOL) 60 mg/2 mL IM injection 60 mg  -     dexamethasone (DECADRON) injection 8 mg  -     Ambulatory Referral to Pain Management; Future  -     Ambulatory Referral to Physical Therapy    2  Herniation of intervertebral disc at C6-C7 level    3  Cervical radiculopathy  Assessment & Plan: Today we were able to make sooner PM appt with Dr Patricio Almaraz  Will keep lyrica dosing the same as it was very recently changed  Small Rx for T3's with instructions on use, misuse, potential SEs  Continue ibu 800mg TID  PT order  F/u prn  Depression Screening and Follow-up Plan: Patient's depression screening was positive with a PHQ-2 score of 6  Their PHQ-9 score was 18  Subjective      Neck Pain   This is a chronic problem  The current episode started more than 1 year ago  The problem occurs constantly  The problem has been gradually worsening  The pain is present in the left side  The quality of the pain is described as burning  The pain is severe  The pain is same all the time  Associated symptoms include numbness and weakness  Pertinent negatives include no chest pain, fever, headaches or leg pain  He has tried acetaminophen, chiropractic manipulation, muscle relaxants, NSAIDs and oral narcotics for the symptoms  The treatment provided mild relief  Established with PM, received C7/T1 interlaminar epidural steroid injection 23  R sided pain better since injection; L side is now problematic and progressing   Ongoing communication with PM, titrated lyrica dosage up, but pain continues to remain debilitating  Was seen in ED per recommendation of PM,  tried PO prednisone pack, a few oxycodone Rx'd  Taking lyrica, 800mg ibu TID, helping some  Relief if he adducts L shoulder  Review of Systems   Constitutional: Positive for activity change and fatigue  Negative for appetite change and fever  Cardiovascular: Negative for chest pain  Musculoskeletal: Positive for back pain, neck pain and neck stiffness  Negative for joint swelling  Neurological: Positive for weakness and numbness  Negative for dizziness, seizures, syncope, light-headedness and headaches         Current Outpatient Medications on File Prior to Visit   Medication Sig   • ALPRAZolam (XANAX) 0 5 mg tablet Take 0 5 mg by mouth daily   • cetirizine (ZyrTEC) 10 mg tablet Take 10 mg by mouth daily   • ibuprofen (MOTRIN) 800 mg tablet Take 1 tablet (800 mg total) by mouth 3 (three) times a day as needed for mild pain   • lisinopril-hydrochlorothiazide (PRINZIDE,ZESTORETIC) 10-12 5 MG per tablet Take 1 tablet by mouth daily   • loratadine (CLARITIN) 10 mg tablet    • oxyCODONE (ROXICODONE) 5 immediate release tablet Take 5 mg by mouth every 4 (four) hours as needed for moderate pain   • pregabalin (Lyrica) 150 mg capsule Take 1 capsule (150 mg total) by mouth 3 (three) times a day (Patient taking differently: Take 100 mg by mouth 3 (three) times a day)   • sertraline (ZOLOFT) 25 mg tablet Take 25 mg by mouth daily   • sildenafil (VIAGRA) 100 mg tablet Take 1 tablet (100 mg total) by mouth daily as needed for erectile dysfunction   • Cholecalciferol (Vitamin D3) 50 MCG (2000 UT) capsule Vitamin D3 50 mcg (2,000 unit) tablet (Patient not taking: Reported on 6/14/2023)   • famotidine (PEPCID) 20 mg tablet 20 mg   • [DISCONTINUED] methylPREDNISolone 4 MG tablet therapy pack Use as directed on package (Patient not taking: Reported on 6/14/2023)       Objective     BP (!) 168/102 (BP Location: Left arm, Patient Position: "Sitting, Cuff Size: Adult)   Pulse 93   Temp 98 3 °F (36 8 °C) (Tympanic)   Ht 5' 11\" (1 803 m)   Wt 113 kg (248 lb 3 2 oz)   SpO2 97%   BMI 34 62 kg/m²     Physical Exam  Vitals and nursing note reviewed  Constitutional:       General: He is in acute distress  Appearance: Normal appearance  He is normal weight  HENT:      Head: Normocephalic  Musculoskeletal:      Left shoulder: Tenderness present  No deformity or bony tenderness  Decreased range of motion  Normal pulse  Cervical back: No rigidity  Comments: Tenderness noted L superior trap, latissimus, triceps  Lymphadenopathy:      Cervical: No cervical adenopathy  Skin:     General: Skin is warm  Neurological:      Mental Status: He is alert  Cranial Nerves: No cranial nerve deficit  Motor: Weakness present  Psychiatric:         Mood and Affect: Mood normal          Thought Content:  Thought content normal          Judgment: Judgment normal        Adriana Saab,   "

## 2023-06-21 ENCOUNTER — TELEPHONE (OUTPATIENT)
Dept: FAMILY MEDICINE CLINIC | Facility: CLINIC | Age: 43
End: 2023-06-21

## 2023-06-21 NOTE — TELEPHONE ENCOUNTER
Marilynn Mcclure called to say that the injection he got when he was here the last time helped for about 3 days  He is now having a lot of pain again  He said he is trying to get in to physical therapy but hasn't started yet  Asking what he can do about the pain   He uses [facility] and he can be reached at [phone]

## 2023-06-21 NOTE — TELEPHONE ENCOUNTER
I spoke to ANNA and he stated that the toradol did help for a day or 2  He stated the tylenol 3 helps to keep it somewhat under control  He did say that since he had the toradol injection he now has numbness in his left hand  He said the every time he gets some kind of injection he ends up with a new problem in a day or 2  I wasn't sure when you wanted to see him but there is an opening at 2:20 tomorrow  He said he can come in at that time if you want to see him   He uses [facility] in [facility] and he can be reached at [phone]

## 2023-06-21 NOTE — TELEPHONE ENCOUNTER
I have only seen this patient 1 time is an establishment of care back in January it looks like Lucila December saw him last week for acute neck pain and gave him a prescription for Tylenol 3 as well as a Toradol injection  Has he been taking the Tylenol 3 and has this been helping?    As I have not personally evaluate the patient and fairly limited taking treatment options however I think a reasonable approach would be to give him the p o  version of Toradol since he endorses that the injection version helped  Let me know what he would like to do    I am always happy to reevaluate here in the office

## 2023-06-22 DIAGNOSIS — M54.12 CERVICAL RADICULITIS: Primary | ICD-10-CM

## 2023-06-22 RX ORDER — KETOROLAC TROMETHAMINE 10 MG/1
10 TABLET, FILM COATED ORAL EVERY 6 HOURS PRN
Qty: 30 TABLET | Refills: 0 | Status: SHIPPED | OUTPATIENT
Start: 2023-06-22

## 2023-06-22 NOTE — TELEPHONE ENCOUNTER
I am happy to send him Toradol p o  by mouth if he is interested without an appointment    If he wants to be seen then he can certainly be put on the schedule

## 2023-06-23 ENCOUNTER — EVALUATION (OUTPATIENT)
Dept: PHYSICAL THERAPY | Facility: CLINIC | Age: 43
End: 2023-06-23
Payer: MEDICARE

## 2023-06-23 DIAGNOSIS — M54.12 CERVICAL RADICULITIS: Primary | ICD-10-CM

## 2023-06-23 PROCEDURE — 97112 NEUROMUSCULAR REEDUCATION: CPT | Performed by: PHYSICAL THERAPIST

## 2023-06-23 PROCEDURE — 97163 PT EVAL HIGH COMPLEX 45 MIN: CPT | Performed by: PHYSICAL THERAPIST

## 2023-06-23 NOTE — PROGRESS NOTES
PT Evaluation     Today's date: 2023  Patient name: Kong Del Toro  : 1980  MRN: [id number]  Referring provider: Dana Cordero  Dx:   Encounter Diagnosis     ICD-10-CM    1  Cervical radiculitis  M54 12                      Assessment  Assessment details: Kong Del Toro is a 44 y o  male who presents with pain, decreased strength and decreased ROM  Due to these impairments, patient has difficulty performing ADL's, recreational activities, engaging in social activities, lifting/carrying  Patient's clinical presentation is consistent with their referring diagnosis of Cervical radiculitis  (primary encounter diagnosis)  Patient reports Sx's similar to ankylosing spondylosis (onset in 's, decreased Sx's with activity, Sx's do not improve with rest  ) Patient may benefit from further testing    Patient has been educated in home exercise program and plan of care  Patient would benefit from skilled physical therapy services to address their aforementioned functional limitations and progress towards prior level of function and independence with home exercise program      Impairments: abnormal muscle firing, abnormal or restricted ROM, activity intolerance, impaired physical strength, lacks appropriate home exercise program and pain with function  Barriers to therapy: Time since onset, numerous injuries  Understanding of Dx/Px/POC: good   Prognosis: good    Goals  Short Term Goals:    1  Initiate and advance HEP  2  AROM cervical ext 50 degrees  3  MMT Right shoulder flexion 4/5    Long Term Goals:    1  Indep with HEP  2  P! 5/10 at worst  3  Rise from Floor  4   Lift 20 lbs    Plan  Patient would benefit from: skilled PT  Planned modality interventions: cryotherapy, electrical stimulation/Russian stimulation and thermotherapy: hydrocollator packs  Planned therapy interventions: joint mobilization, manual therapy, patient education, postural training, activity modification, abdominal trunk stabilization, body mechanics training, flexibility, functional ROM exercises, graded exercise, home exercise program, neuromuscular re-education, strengthening, stretching, therapeutic activities, therapeutic exercise, motor coordination training, muscle pump exercises, gait training, balance/weight bearing training and ADL training  Frequency: 2x week  Duration in weeks: 8  Treatment plan discussed with: patient        Subjective Evaluation    History of Present Illness  Mechanism of injury: Pt reports in 2004 insidious onset of right side neck pain while in [place]  Pt reports 2 years ago he had an injection in his right side which caused pain on his left  Pt reports historically injections improve his Sx's  Pain  Current pain ratin  At best pain ratin  At worst pain rating: 10  Quality: tight, throbbing, knife-like and sharp (vice like)  Aggravating factors: standing and sitting  Progression: worsening      Diagnostic Tests  X-ray: abnormal  Treatments  Previous treatment: physical therapy, medication, injection treatment, chiropractic and massage  Current treatment: chiropractic, massage, medication and physical therapy  Patient Goals  Patient goals for therapy: decreased pain, increased strength, independence with ADLs/IADLs, return to sport/leisure activities and return to work          Objective     Static Posture     Head  Forward  Shoulders  Rounded      Active Range of Motion   Cervical/Thoracic Spine       Cervical    Flexion: 21 degrees   Extension: 16 degrees      Left lateral flexion: 15 degrees      Right lateral flexion: 18 degrees      Left rotation: 50 degrees  Right rotation: 50 degrees         Thoracic    Left rotation:  WFL  Right rotation:  [facility]  Left Shoulder   Flexion: 152 degrees   Abduction: 140 degrees   External rotation BTH: T3   Internal rotation BTB: T12     Right Shoulder   Flexion: 132 degrees   Abduction: 119 degrees   External rotation BTH: T2 "  Internal rotation BTB: L2     Strength/Myotome Testing   Cervical Spine   Neck extension: 2+  Neck flexion: 2+    Left   Neck lateral flexion (C3): 2+    Right   Neck lateral flexion (C3): 2+    Left Shoulder     Planes of Motion   Flexion: 4   Abduction: 4   External rotation at 0°: 4   Internal rotation at 0°: 4     Right Shoulder     Planes of Motion   Flexion: 2+   Abduction: 2+   External rotation at 0°: 2+   Internal rotation at 0°: 2+     Tests     Left Shoulder   Negative ULTT1, ULTT3 and ULTT4  Right Shoulder   Negative ULTT1, ULTT3 and ULTT4                Precautions: HTN, TBI, PTSD      Manuals 6/23        MFR to UT, Levators, Scalenes, SOR, Subscapularis         Cervical P->A mobs                           Neuro Re-Ed         Cervical Retraction  5x2        NecksLevel Rotation ROM  ADD       NecksLevel Side Bend ROM         NecksLevel Flex/Ext ROM         Pec Stretch 30 \" hold         Pec Minor Stretch 30 \" hold                  Ther Ex         UBE  ADD       Scalene Stretch 30 \" hold 3x        Levator Stretch 30 \" hold         ER Pulse         Supine 90 degree ER Stretch 30\" hold  ADD                                  Ther Activity                           Gait Training                           Modalities                              "

## 2023-06-26 ENCOUNTER — OFFICE VISIT (OUTPATIENT)
Dept: PHYSICAL THERAPY | Facility: CLINIC | Age: 43
End: 2023-06-26
Payer: MEDICARE

## 2023-06-26 DIAGNOSIS — M54.12 CERVICAL RADICULITIS: Primary | ICD-10-CM

## 2023-06-26 PROCEDURE — 97530 THERAPEUTIC ACTIVITIES: CPT

## 2023-06-26 PROCEDURE — 97110 THERAPEUTIC EXERCISES: CPT

## 2023-06-26 PROCEDURE — 97140 MANUAL THERAPY 1/> REGIONS: CPT

## 2023-06-26 NOTE — PROGRESS NOTES
Daily Note     Today's date: 2023  Patient name: Lisandro Myers  : 1980  MRN: [id number]  Referring provider: Suraj Berger*  Dx:   Encounter Diagnosis     ICD-10-CM    1  Cervical radiculitis  M54 12                      Subjective: patient stated his pain is in his left axillary region more posterior in region of lattisimus dorsi and down his triceps  Patient stated the pain is like a vice  that gets to point he has screamed out in pain, cried and went to one knee  Patient stated the area is painful with palpation  Michaela Costello stated he is always at 8-9/10 on pain scale  Michaela Costello reported he wakes with pain at night more in the second half  He said he keeps his left arm at his side because it only position that is comfortable  Patient reported the pain he is experiencing has been since his injection 8 weeks ago from pain management for cervical pain  Two days ago, he received an injection of Toradol and steroid  Now, he has numbness in his left hand  The pain in his axillary region is beginning to increase again, but oral prescription of Toradol and tylenol with codeine  has taken the edge off  Patient stated he has been getting cervical injection in his neck since approximately   Michaela Costello has been treated for his neck since       Objective: See treatment diary below      Assessment: patient experience pain cervical retraction but completed task  Progressing patient slowly due to increased pain during therapy  Patient experienced a moderated amount of pain t/o session  Educated patient on POC established for their specific diagnosis and their LOF  Verbal and visual cues required for proper execution of exercise with program  Overall tolerated treatment fair  Will continue to progress in upcoming visits as able        Plan: Continue per plan of care        Precautions: HTN, TBI, PTSD      Manuals          MFR to UT, Levators, Scalenes, SOR, Subscapularis  10'       Cervical "P->A mobs                           Neuro Re-Ed         Cervical Retraction  5x2 2 sets of 5  2-3\"hd        NecksLevel Rotation ROM  Yellow  15x       NecksLevel Side Bend ROM         NecksLevel Flex/Ext ROM         Pec Stretch 30 \" hold         Pec Minor Stretch 30 \" hold                  Ther Ex         UBE  120 resist  6 min alt        Scalene Stretch 30 \" hold 3x 3x       Levator Stretch 30 \" hold  3x       ER Pulse         Supine 90 degree ER Stretch 30\" hold  3x L only                                  Ther Activity                           Gait Training                           Modalities                                "

## 2023-06-27 ENCOUNTER — OFFICE VISIT (OUTPATIENT)
Dept: PHYSICAL THERAPY | Facility: CLINIC | Age: 43
End: 2023-06-27
Payer: MEDICARE

## 2023-06-27 DIAGNOSIS — M54.12 CERVICAL RADICULITIS: Primary | ICD-10-CM

## 2023-06-27 PROCEDURE — 97112 NEUROMUSCULAR REEDUCATION: CPT | Performed by: PHYSICAL THERAPIST

## 2023-06-27 PROCEDURE — 97140 MANUAL THERAPY 1/> REGIONS: CPT | Performed by: PHYSICAL THERAPIST

## 2023-06-27 NOTE — PROGRESS NOTES
"Daily Note     Today's date: 2023  Patient name: Awilda Matthews  : 1980  MRN: [id number]  Referring provider: Veronique Myles*  Dx:   Encounter Diagnosis     ICD-10-CM    1  Cervical radiculitis  M54 12                      Subjective: Pt reports increased Sx's with L cervical side bend      Objective: See treatment diary below      Assessment: Tolerated treatment well  Patient would benefit from continued PT      Plan: Progress treatment as tolerated         Precautions: HTN, TBI, PTSD      Manuals       MFR to UT, Levators, Scalenes, SOR, Subscapularis  10' 15'      Cervical P->A mobs                           Neuro Re-Ed         Cervical Retraction  5x2 2 sets of 5  2-3\"hd  3x5      NecksLevel Rotation ROM  Yellow  15x Yellow 20x      NecksLevel Side Bend ROM   Yellow 20x      NecksLevel Flex/Ext ROM         Pec Stretch 30 \" hold   3x      Pec Minor Stretch 30 \" hold                  Ther Ex         UBE  120 resist  6 min alt  110 resist  6 min ALT      Scalene Stretch 30 \" hold 3x 3x 3x      Levator Stretch 30 \" hold  3x 3x      ER Pulse         Supine 90 degree ER Stretch 30\" hold  3x L only 3x L only                                 Ther Activity                           Gait Training                           Modalities                                "

## 2023-07-03 ENCOUNTER — OFFICE VISIT (OUTPATIENT)
Dept: PHYSICAL THERAPY | Facility: CLINIC | Age: 43
End: 2023-07-03
Payer: MEDICARE

## 2023-07-03 DIAGNOSIS — M54.12 CERVICAL RADICULITIS: Primary | ICD-10-CM

## 2023-07-03 PROCEDURE — 97140 MANUAL THERAPY 1/> REGIONS: CPT | Performed by: PHYSICAL THERAPIST

## 2023-07-03 PROCEDURE — 97110 THERAPEUTIC EXERCISES: CPT | Performed by: PHYSICAL THERAPIST

## 2023-07-03 NOTE — PROGRESS NOTES
Daily Note     Today's date: 7/3/2023  Patient name: Bonnita Jeans  : 1980  MRN: [id number]  Referring provider: Ines Ponce  Dx:   Encounter Diagnosis     ICD-10-CM    1. Cervical radiculitis  M54.12                      Subjective: Pt reports L neck and shoulder pain began Friday and continued through trhe weekend      Objective: See treatment diary below      Assessment: Tolerated treatment well. Patient with left Subscapularis tension      Plan: Progress treatment as tolerated.        Precautions: HTN, TBI, PTSD      Manuals 6/23 6/26   6/27 7/3     MFR to UT, Levators, Scalenes, SOR, Subscapularis  10' 15' 45'     Cervical P->A mobs                           Neuro Re-Ed         Cervical Retraction  5x2 2 sets of 5  2-3"hd  3x5      NecksLevel Rotation ROM  Yellow  15x Yellow 20x      NecksLevel Side Bend ROM   Yellow 20x      NecksLevel Flex/Ext ROM         Pec Stretch 30 " hold   3x      Pec Minor Stretch 30 " hold                  Ther Ex         UBE  120 resist  6 min alt  110 resist  6 min  resist  6 min ALT     Scalene Stretch 30 " hold 3x 3x 3x      Levator Stretch 30 " hold  3x 3x      ER Pulse         Supine 90 degree ER Stretch 30" hold  3x L only 3x L only                                 Ther Activity                           Gait Training                           Modalities

## 2023-07-05 ENCOUNTER — OFFICE VISIT (OUTPATIENT)
Dept: PHYSICAL THERAPY | Facility: CLINIC | Age: 43
End: 2023-07-05
Payer: MEDICARE

## 2023-07-05 DIAGNOSIS — M54.12 CERVICAL RADICULITIS: Primary | ICD-10-CM

## 2023-07-05 PROCEDURE — 97112 NEUROMUSCULAR REEDUCATION: CPT

## 2023-07-05 PROCEDURE — 97140 MANUAL THERAPY 1/> REGIONS: CPT

## 2023-07-05 PROCEDURE — 97110 THERAPEUTIC EXERCISES: CPT

## 2023-07-05 NOTE — PROGRESS NOTES
Daily Note     Today's date: 2023  Patient name: Joss Ross  : 1980  MRN: [id number]  Referring provider: James Melton*  Dx:   Encounter Diagnosis     ICD-10-CM    1. Cervical radiculitis  M54.12                      Subjective: patient stated he felt good after last treatment session with pain going down to a 5/10. Dayana Alcaraz said drive home stiffens him up today. Patient primary c/o pain is on left side cervical with radiculopathy into axillary region. Objective: See treatment diary below      Assessment: patient has difficult with side bend and rotation to left with a painful end feel prompting the added of two cervical mobilization exercises. Cervical and scapular musculature not tight or spasm with palpation. Patient's initial tolerance to cervical distraction was positive. Plan: Continue per plan of care. Progress treatment as tolerated.        Precautions: HTN, TBI, PTSD      Manuals 6/23 6/26   6/27 7/3 7/5    MFR to UT, Levators, Scalenes, SOR, Subscapularis  10' 15' 45' 10'    Cervical P->A mobs         Cervical distraction     5'             Neuro Re-Ed         Cervical Retraction  5x2 2 sets of 5  2-3"hd  3x5  3 sets of 5 reps 3" hd     NecksLevel Rotation ROM  Yellow  15x Yellow 20x  Yellow 20x    NecksLevel Side Bend ROM   Yellow 20x  Yellow 20x     NecksLevel Flex/Ext ROM         Pec Stretch 30 " hold   3x  3x    Pec Minor Stretch 30 " hold         Cervical mobilization rotation with nods      L only  10x     Cervical mobilization side bend and rotate     L only   10x    Prone cervical extension      10x             Ther Ex         UBE  120 resist  6 min alt  110 resist  6 min  resist  6 min  resist  6 min alt     Scalene Stretch 30 " hold 3x 3x 3x  3x    Levator Stretch 30 " hold  3x 3x  3x    ER Pulse         Supine 90 degree ER Stretch 30" hold  3x L only 3x L only  3x L only                               Ther Activity Gait Training                           Modalities

## 2023-07-07 ENCOUNTER — AMB VIDEO VISIT (OUTPATIENT)
Dept: OTHER | Facility: HOSPITAL | Age: 43
End: 2023-07-07

## 2023-07-07 DIAGNOSIS — L25.5 RHUS DERMATITIS: Primary | ICD-10-CM

## 2023-07-07 PROCEDURE — ECARE PR SL URGENT CARE VIRTUAL VISIT: Performed by: PHYSICIAN ASSISTANT

## 2023-07-07 RX ORDER — PREDNISONE 20 MG/1
TABLET ORAL
Qty: 12 TABLET | Refills: 0 | Status: SHIPPED | OUTPATIENT
Start: 2023-07-07 | End: 2023-07-15

## 2023-07-07 NOTE — CARE ANYWHERE EVISITS
Visit Summary for Rupinder Layer - Gender: Male - Date of Birth: [id number]  Date: [id number] - Duration: 6 minutes  Patient: Rupinder Garza  Provider: Avril Torres PA-C    Patient Contact Information  Address  [address]  [facility]; [address]      Visit Topics  Rash [Added Niles Judge - 2023-07-07]    Triage Questions   What is your current physical address in the event of a medical emergency? Answer []  Are you allergic to any medications? Answer []  Are you now or could you be pregnant? Answer []  Do you have any immune system compromise or chronic lung   disease? Answer []  Do you have any vulnerable family members in the home (infant, pregnant, cancer, elderly)? Answer []     Conversation Transcripts  [0A][0A] [Notification] You are connected with Avril Torres PA-C, Urgent Care Specialist.[0A][Notification] [facility] is located in [place]. [0A][Notification] [facility] has shared health history. Ling Epps .[0A]    Diagnosis  Unspecified contact dermatitis due to plants, except food    Procedures  Value: [id number] Code: CPT-4 UNLISTED E&M SERVICE    Medications Prescribed    No prescriptions ordered    Electronically signed by: Jodi Orellana(NPI [id number])

## 2023-07-07 NOTE — PATIENT INSTRUCTIONS
The prescription steroid should help decrease the reaction to the poison ivy.   You can use over-the-counter Caladryl or Ivarest cream to help dry out the rash and help with itching  You could take over-the-counter Claritin, pepcid and Benadryl to help with the itching as well  You can use Zanfel or Technu soap to wash the oil from the plant off the skin  Patient to wash any clothing/shoes/linens that may have come into contact with the oils  Never used steroid cream on the face or genitals  You can use steroid (hydrocortisone) cream in small amounts as needed for itching

## 2023-07-07 NOTE — PROGRESS NOTES
Video Visit - Sarah Grider 44 y.o. male MRN: [id number]    REQUIRED DOCUMENTATION:         1. This service was provided via [employer]. 2. Provider located at [facility]  [address] [address] [phone]. 3. [facility] provider: Clyde Geiger PA-C.  4. Identify all parties in room with patient during [facility] visit:  No one else  5. After connecting through televideo, patient was identified by name and date of birth. Patient was then informed that this was a Telemedicine visit and that the exam was being conducted confidentially over secure lines. My office door was closed. No one else was in the room. Patient acknowledged consent and understanding of privacy and security of the Telemedicine visit. I informed the patient that I have reviewed their record in Epic and presented the opportunity for them to ask any questions regarding the visit today. The patient agreed to participate. HPI  Pt reports poison ivy starting 7/3/23 on hand and legs now LUE. Now feeling itchy to R eyelid. Tried calamine, technu, benadryl. Physical Exam  Constitutional:       General: He is not in acute distress. Appearance: Normal appearance. He is not toxic-appearing. HENT:      Head: Normocephalic and atraumatic. Nose: No rhinorrhea. Mouth/Throat:      Mouth: Mucous membranes are moist.   Eyes:      Conjunctiva/sclera: Conjunctivae normal.   Pulmonary:      Effort: Pulmonary effort is normal. No respiratory distress. Breath sounds: No wheezing (no gross audible wheeze through computer). Musculoskeletal:      Cervical back: Normal range of motion. Skin:     Findings: Rash (multiple patches of erythematous vesicular rash to L arm from elbow to hand) present. Neurological:      Mental Status: He is alert. Cranial Nerves: No dysarthria or facial asymmetry.    Psychiatric:         Mood and Affect: Mood normal.         Behavior: Behavior normal. Diagnoses and all orders for this visit:    Rhus dermatitis  -     predniSONE 20 mg tablet; Take 2.5 tablets (50 mg total) by mouth daily for 2 days, THEN 2 tablets (40 mg total) daily for 2 days, THEN 1 tablet (20 mg total) daily for 2 days, THEN 0.5 tablets (10 mg total) daily for 2 days. Patient Instructions   The prescription steroid should help decrease the reaction to the poison ivy.   You can use over-the-counter Caladryl or Ivarest cream to help dry out the rash and help with itching  You could take over-the-counter Claritin, pepcid and Benadryl to help with the itching as well  You can use Zanfel or Technu soap to wash the oil from the plant off the skin  Patient to wash any clothing/shoes/linens that may have come into contact with the oils  Never used steroid cream on the face or genitals  You can use steroid (hydrocortisone) cream in small amounts as needed for itching

## 2023-07-10 ENCOUNTER — OFFICE VISIT (OUTPATIENT)
Dept: PAIN MEDICINE | Facility: CLINIC | Age: 43
End: 2023-07-10
Payer: MEDICARE

## 2023-07-10 ENCOUNTER — OFFICE VISIT (OUTPATIENT)
Dept: PHYSICAL THERAPY | Facility: CLINIC | Age: 43
End: 2023-07-10
Payer: MEDICARE

## 2023-07-10 VITALS — BODY MASS INDEX: 35.5 KG/M2 | WEIGHT: 253.6 LBS | HEIGHT: 71 IN | RESPIRATION RATE: 16 BRPM

## 2023-07-10 DIAGNOSIS — M54.12 CERVICAL RADICULITIS: ICD-10-CM

## 2023-07-10 DIAGNOSIS — M47.812 CERVICAL SPONDYLOSIS: ICD-10-CM

## 2023-07-10 DIAGNOSIS — G89.4 CHRONIC PAIN SYNDROME: ICD-10-CM

## 2023-07-10 DIAGNOSIS — M54.12 CERVICAL RADICULITIS: Primary | ICD-10-CM

## 2023-07-10 DIAGNOSIS — M48.02 FORAMINAL STENOSIS OF CERVICAL REGION: Primary | ICD-10-CM

## 2023-07-10 PROCEDURE — 99214 OFFICE O/P EST MOD 30 MIN: CPT | Performed by: ANESTHESIOLOGY

## 2023-07-10 PROCEDURE — 97110 THERAPEUTIC EXERCISES: CPT | Performed by: PHYSICAL THERAPIST

## 2023-07-10 PROCEDURE — 97140 MANUAL THERAPY 1/> REGIONS: CPT | Performed by: PHYSICAL THERAPIST

## 2023-07-10 NOTE — PROGRESS NOTES
Assessment:  1. Foraminal stenosis of cervical region    2. Cervical radiculitis    3. Cervical spondylosis    4. Chronic pain syndrome        Plan:  Patient is a 27-year-old male with complaints of neck pain, bilateral shoulder pain, low back pain chronic patient secondary to cervical spondylosis, cervical radiculopathy, myofascial pain syndrome presents to office for follow-up visit. Patient was previously managed by Dr. Radha Aceves and Dr. Castillo Tolentino for neck pain and bilateral arm pain. Patient had multiple cervical epidural steroid injections without any significant alleviation symptoms in office family noted exacerbation of symptoms especially severe pain into the left upper extremity as though he felt like his nerves were being squeezed. At this time we realized that cervical epidurals were not the able to provide patient with significant alleviation of symptoms. He does have some severe narrowing around the foramens. Conservative therapy is noted to fail. We did review spinal cord stimulator as a neck step for alleviation of patient's neck pain and upper extremity symptoms. There is some concern about the patient's level of pain and the pathology noted on the CT cervical spine. Patient is unfortunately unable to get a MRI of the cervical spine secondary to the presence of strap metal.    1.  We will provide patient with a neurosurgical consultation with Dr. Meg Rivera  2. We will start with the spinal cord stimulator process by providing patient with a psychological evaluation in preparation for spinal cord stimulator            History of Present Illness: The patient is a 44 y.o. male who presents for a follow up office visit in regards to Neck Pain. The patient’s current symptoms include 7 out of 10 constant burning, dull sensation, sharp, throbbing, cramping, pressure-like, shooting, nose, pins-and-needles with any particular time pattern. Current pain medications includes: Lyrica.   The patient reports that this regimen is providing 0% pain relief. The patient is reporting no side effects from this pain medication regimen. I have personally reviewed and/or updated the patient's past medical history, past surgical history, family history, social history, current medications, allergies, and vital signs today. Review of Systems  Review of Systems   Gastrointestinal: Positive for nausea. Musculoskeletal: Positive for myalgias and neck pain. All other systems reviewed and are negative.           Past Medical History:   Diagnosis Date   • Fractures    • Head injury    • Hyperlipidemia    • Hypertension    • PTSD (post-traumatic stress disorder)        Past Surgical History:   Procedure Laterality Date   • ANKLE SURGERY     • ARM DEBRIDEMENT     • COLONOSCOPY     • FL MYELOGRAM 2 OR MORE REGIONS  2023   • HIP SURGERY     • KNEE SURGERY     • SHOULDER SURGERY     • WRIST SURGERY         Family History   Adopted: Yes       Social History     Occupational History   • Not on file   Tobacco Use   • Smoking status: Former     Packs/day: 0.50     Years: 4.00     Total pack years: 2.00     Types: Cigarettes     Quit date:      Years since quittin.5   • Smokeless tobacco: Former   Vaping Use   • Vaping Use: Some days   • Substances: CBD   Substance and Sexual Activity   • Alcohol use: Not Currently   • Drug use: No   • Sexual activity: Not on file         Current Outpatient Medications:   •  ALPRAZolam (XANAX) 0.5 mg tablet, Take 0.5 mg by mouth daily, Disp: , Rfl:   •  cetirizine (ZyrTEC) 10 mg tablet, Take 10 mg by mouth daily, Disp: , Rfl:   •  famotidine (PEPCID) 20 mg tablet, 20 mg, Disp: , Rfl:   •  ibuprofen (MOTRIN) 800 mg tablet, Take 1 tablet (800 mg total) by mouth 3 (three) times a day as needed for mild pain, Disp: 90 tablet, Rfl: 0  •  lisinopril-hydrochlorothiazide (PRINZIDE,ZESTORETIC) 10-12.5 MG per tablet, Take 1 tablet by mouth daily, Disp: 90 tablet, Rfl: 5  •  predniSONE 20 mg tablet, Take 2.5 tablets (50 mg total) by mouth daily for 2 days, THEN 2 tablets (40 mg total) daily for 2 days, THEN 1 tablet (20 mg total) daily for 2 days, THEN 0.5 tablets (10 mg total) daily for 2 days. , Disp: 12 tablet, Rfl: 0  •  pregabalin (Lyrica) 150 mg capsule, Take 1 capsule (150 mg total) by mouth 3 (three) times a day (Patient taking differently: Take 100 mg by mouth 3 (three) times a day), Disp: 90 capsule, Rfl: 1  •  sertraline (ZOLOFT) 25 mg tablet, Take 25 mg by mouth daily, Disp: , Rfl:   •  sildenafil (VIAGRA) 100 mg tablet, Take 1 tablet (100 mg total) by mouth daily as needed for erectile dysfunction, Disp: 20 tablet, Rfl: 0  •  ketorolac (TORADOL) 10 mg tablet, Take 1 tablet (10 mg total) by mouth every 6 (six) hours as needed for moderate pain (Patient not taking: Reported on 7/10/2023), Disp: 30 tablet, Rfl: 0  •  loratadine (CLARITIN) 10 mg tablet, , Disp: , Rfl:   •  oxyCODONE (ROXICODONE) 5 immediate release tablet, Take 5 mg by mouth every 4 (four) hours as needed for moderate pain (Patient not taking: Reported on 7/10/2023), Disp: , Rfl:     No Known Allergies    Physical Exam:    Resp 16   Ht 5' 11" (1.803 m)   Wt 115 kg (253 lb 9.6 oz)   BMI 35.37 kg/m²     Constitutional:normal, well developed, well nourished, alert, in no distress and non-toxic and no overt pain behavior. and obese  Eyes:anicteric  HEENT:grossly intact  Neck:supple, symmetric, trachea midline and no masses   Pulmonary:even and unlabored  Cardiovascular:No edema or pitting edema present  Skin:Normal without rashes or lesions and well hydrated  Psychiatric:Mood and affect appropriate  Neurologic:Cranial Nerves II-XII grossly intact  Musculoskeletal:normal     Cervical Spine examination demonstrates. Decreased ROM secondary to pain with lateral rotation to the left/right and bending to the left/right, in addition to neck flexion. 3/5 right upper extremity strength in all muscle groups bilaterally.  Negative Spurling's maneuver to the b/l Ue, sensitivity to light touch intact b/l Ue. Imaging    Study Result    Narrative & Impression   CERVICAL MYELOGRAM     INDICATION: Pain, radiculopathy     COMPARISON: CT cervical spine 7/21/2022        TECHNIQUE:    CT examination of the cervical spine was performed without intravenous contrast.  Contiguous axial images were obtained. Sagittal and coronal reconstructions were performed.       Radiation dose length product (DLP) for this visit:  519 mGy-cm . This examination, like all CT scans performed in the [facility], was performed utilizing techniques to minimize radiation dose exposure, including the use of iterative   reconstruction and automated exposure control.       The Lumbar puncture and the injection of contrast were performed by the PA and are dictated separately and described in the Myelogram report.     IMAGE QUALITY:  Diagnostic.     FINDINGS:     POSTMYELOGRAPHIC PLAIN FILMS:  Smooth transition of intrathecal contrast noted normal radiographic block.     POST MYELOGRAPHIC CT     ALIGNMENT:  There is preserved normal cervical lordosis. Mild dextroscoliotic curvature.     VERTEBRAE:  Vertebrae heights are preserved. No fracture.     DEGENERATIVE CHANGES     C2-C3:  No disc bulge. No canal or foraminal stenosis.     C3-C4: Mild disc bulge. Mild uncovertebral spurring. Mild canal stenosis. Mild left foraminal narrowing.     C4-C5: No significant disc bulge. No canal or significant foraminal stenosis.     C5-C6: Disc osteophyte complex and uncovertebral spurring resulting in left lateral recess narrowing and severe left foraminal narrowing. There is mild canal stenosis. Moderate right foraminal narrowing.     C6-C7: Disc osteophyte complex resulting in mild canal stenosis. Right greater than left uncovertebral spurring resulting in moderate to severe right and mild left foraminal narrowing.     C7-T1: No disc bulge.   No canal or foraminal stenosis.     UPPER THORACIC DISC SPACES:  No significant degenerative change.     PREVERTEBRAL AND PARASPINAL SOFT TISSUES: Normal.     OTHER FINDINGS: Partially imaged mild paranasal sinus disease.     IMPRESSION:     1. Degenerative change pronounced at levels C5-6 and C6-7.     2.  Left lateral recess stenosis and severe left foraminal narrowing at level C5-6. There is mild canal stenosis at this level. Correlate for left C6 radiculopathy.     3.  Mild canal stenosis and moderate to severe right foraminal narrowing at C6-7. Correlate for right C7 radiculopathy.              Workstation performed: [id number]              No orders to display       No orders of the defined types were placed in this encounter.

## 2023-07-10 NOTE — PATIENT INSTRUCTIONS
Neck Exercises   WHAT YOU NEED TO KNOW:   What do I need to know about neck exercises? Neck exercises help reduce neck pain, and improve neck movement and strength. Neck exercises also help prevent long-term neck problems. What do I need to know about neck exercise safety? Move slowly, gently, and smoothly. Avoid fast or jerky motions. Stand and sit the way your healthcare provider shows you. Good posture may reduce your neck pain. Check your posture often, even when you are not doing your neck exercises. Follow the exercise program recommended by your healthcare provider. He or she will tell you which exercises are best for your condition. He or she will also tell you how many repetitions to do and how often you should do the exercises. How do I perform neck exercises safely? Exercise position:  You may sit or stand while you do neck exercises. Face forward. Your shoulders should be straight and relaxed, with a good posture. Head tilts, forward and back:  Gently bow your head and try to touch your chin to your chest. Your healthcare provider may tell you to push on the back of your neck to help bow your head. Raise your chin back to the starting position. Tilt your head back as far as possible so you are looking up at the ceiling. Your healthcare provider may tell you to lift your chin to help tilt your head back. Return your head to the starting position. Head tilts, side to side:  Tilt your head, bringing your ear toward your shoulder. Then tilt your head toward the other shoulder. Head turns:  Turn your head to look over your shoulder. Tilt your chin down and try to touch it to your shoulder. Do not raise your shoulder to your chin. Face forward again. Do the same on the other side. Head rolls:  Slowly bring your chin toward your chest. Next, roll your head to the right. Your ear should be positioned over your shoulder. Hold this position for 5 seconds.  Roll your head back toward your chest and to the left into the same position. Hold for 5 seconds. Gently roll your head back and around in a clockwise [place] 3 times. Next, move your head in the reverse direction (counterclockwise) in a [place] 3 times. Do not shrug your shoulders upwards while you do this exercise. When should I call my doctor? Your pain does not get better, or gets worse. You have questions or concerns about your condition, care, or exercise program.    CARE AGREEMENT:   You have the right to help plan your care. Learn about your health condition and how it may be treated. Discuss treatment options with your healthcare providers to decide what care you want to receive. You always have the right to refuse treatment. The above information is an  only. It is not intended as medical advice for individual conditions or treatments. Talk to your doctor, nurse or pharmacist before following any medical regimen to see if it is safe and effective for you. © Copyright [employer] 2022 Information is for End User's use only and may not be sold, redistributed or otherwise used for commercial purposes.

## 2023-07-10 NOTE — PROGRESS NOTES
Daily Note     Today's date: 7/10/2023  Patient name: Phillip Berrios  : 1980  MRN: [id number]  Referring provider: Leyla Davalos*  Dx:   Encounter Diagnosis     ICD-10-CM    1. Cervical radiculitis  M54.12                      Subjective: Pt reports meeting with the MD today who recommends spinal stim. Pt reports being in excruciating pain the day after his last visit. Objective: See treatment diary below      Assessment: Tolerated treatment well. Patient treatment modified secondary to patient time constraints.       Plan: Increase ER Pulse to 2 1/2 min     Precautions: HTN, TBI, PTSD      Manuals 6/23 6/26   6/27 7/3 7/5 7/10   MFR to UT, Levators, Scalenes, SOR, Subscapularis  10' 15' 45' 10' 10'   Cervical P->A mobs         Cervical distraction     5'             Neuro Re-Ed         Cervical Retraction  5x2 2 sets of 5  2-3"hd  3x5  3 sets of 5 reps 3" hd  20x 3"    NecksLevel Rotation   Yellow  15x Yellow 20x  Yellow 20x Yellow 20x   NecksLevel Side Bend ROM   Yellow 20x  Yellow 20x  Yellow 20x   NecksLevel Flex/Ext ROM         Pec Stretch 30 " hold   3x  3x    Pec Minor Stretch 30 " hold         Cervical mobilization rotation with nods      L only  10x     Cervical mobilization side bend and rotate     L only   10x    Prone cervical extension      10x             Ther Ex         UBE  120 resist  6 min alt  110 resist  6 min  resist  6 min  resist  6 min alt  110 resist  6 min ALT   Scalene Stretch 30 " hold 3x 3x 3x  3x    Levator Stretch 30 " hold  3x 3x  3x    ER Pulse      2 min   Supine 90 degree ER Stretch 30" hold  3x L only 3x L only  3x L only                               Ther Activity                           Gait Training                           Modalities

## 2023-07-11 ENCOUNTER — APPOINTMENT (OUTPATIENT)
Dept: PHYSICAL THERAPY | Facility: CLINIC | Age: 43
End: 2023-07-11
Payer: MEDICARE

## 2023-07-12 PROBLEM — M47.812 CERVICAL SPONDYLOSIS: Status: ACTIVE | Noted: 2023-07-12

## 2023-07-12 PROBLEM — G89.4 CHRONIC PAIN SYNDROME: Status: ACTIVE | Noted: 2023-07-12

## 2023-07-17 ENCOUNTER — OFFICE VISIT (OUTPATIENT)
Dept: PHYSICAL THERAPY | Facility: CLINIC | Age: 43
End: 2023-07-17
Payer: MEDICARE

## 2023-07-17 DIAGNOSIS — M54.12 CERVICAL RADICULITIS: Primary | ICD-10-CM

## 2023-07-17 PROCEDURE — 97112 NEUROMUSCULAR REEDUCATION: CPT

## 2023-07-17 PROCEDURE — 97140 MANUAL THERAPY 1/> REGIONS: CPT

## 2023-07-17 PROCEDURE — 97110 THERAPEUTIC EXERCISES: CPT

## 2023-07-17 NOTE — PROGRESS NOTES
Daily Note     Today's date: 2023  Patient name: Jairo Griffin  : 1980  MRN: [id number]  Referring provider: Beulah Clifton*  Dx:   Encounter Diagnosis     ICD-10-CM    1. Cervical radiculitis  M54.12           Start Time: 0800  Stop Time: 0900  Total time in clinic (min): 60 minutes    Subjective: Patient states that he has "a long drive to get up here" and often exhibits radicular UE sxs after long drive. Patient states that R side was bothering him 2 days prior, but has resolved. Patient states that cervical retractions, although painful during the activity "Really helps" his sxs and "10 min later" relieves sxs. Objective: See treatment diary below      Assessment: Session initiated with UBE Radicular sxs L>R at start of session. Some neuro activities held as document below 2* to previous session worsening sxs from activities. Will trial at later date. VCs and visual cues to improve stretch during scalene stretch and to avoid pain. Scalene stretch on L stopped 2* to increase irritation. Tolerated treatment well. Patient demonstrated fatigue post treatment and would benefit from continued PT to improve cervical strength and mobility. Plan: Continue per plan of care.       Precautions: HTN, TBI, PTSD      Manuals 7/17 6/26   6/27 7/3 7/5 7/10   MFR to UT, Levators, Scalenes, SOR, Subscapularis 15' 10' 15' 45' 10' 10'   Cervical P->A mobs         Cervical distraction     5'             Neuro Re-Ed         Cervical Retraction  20x 3"  2 sets of 5  2-3"hd  3x5  3 sets of 5 reps 3" hd  20x 3"    NecksLevel Rotation  Yellow 20x Yellow  15x Yellow 20x  Yellow 20x Yellow 20x   NecksLevel Side Bend ROM Yellow 20x  Yellow 20x  Yellow 20x  Yellow 20x   NecksLevel Flex/Ext ROM         Pec Stretch 30 " hold 3x  3x  3x 3x   Pec Minor Stretch 30 " hold         Cervical mobilization rotation with nods  HELD    L only  10x     Cervical mobilization side bend and rotate HELD    L only   10x Prone cervical extension  HELD    10x             Ther Ex          resist  6 min  resist  6 min alt  110 resist  6 min  resist  6 min  resist  6 min alt  110 resist  6 min ALT   Scalene Stretch 30 " hold 3x pain on L at 3rd rep so stopped 3x 3x  3x    Levator Stretch 30 " hold 3x 3x 3x  3x    ER Pulse 2 min     2 min   Supine 90 degree ER Stretch 30" hold 3x L only 3x L only 3x L only  3x L only                               Ther Activity                           Gait Training                           Modalities

## 2023-07-19 ENCOUNTER — RA CDI HCC (OUTPATIENT)
Dept: OTHER | Facility: HOSPITAL | Age: 43
End: 2023-07-19

## 2023-07-19 ENCOUNTER — OFFICE VISIT (OUTPATIENT)
Dept: PHYSICAL THERAPY | Facility: CLINIC | Age: 43
End: 2023-07-19
Payer: MEDICARE

## 2023-07-19 DIAGNOSIS — M54.12 CERVICAL RADICULITIS: Primary | ICD-10-CM

## 2023-07-19 PROCEDURE — 97140 MANUAL THERAPY 1/> REGIONS: CPT | Performed by: PHYSICAL THERAPIST

## 2023-07-19 PROCEDURE — 97112 NEUROMUSCULAR REEDUCATION: CPT | Performed by: PHYSICAL THERAPIST

## 2023-07-19 NOTE — PROGRESS NOTES
Daily Note     Today's date: 2023  Patient name: Janna Chang  : 1980  MRN: [id number]  Referring provider: Becky Todd*  Dx:   Encounter Diagnosis     ICD-10-CM    1. Cervical radiculitis  M54.12                      Subjective: Pt reports slight increase in pain since cervical distraction last visit      Objective: See treatment diary below      Assessment: Tolerated treatment well. Patient with increased subscapularis tension which reduced with manual      Plan: Progress treatment as tolerated.        Precautions: HTN, TBI, PTSD      Manuals 7/17 7/19  7/3 7/5 7/10   MFR to UT, Levators, Scalenes, SOR, Subscapularis 15' 15'  45' 10' 10'   Cervical P->A mobs         Cervical distraction     5'             Neuro Re-Ed         Cervical Retraction  20x 3"  20x 3"   3 sets of 5 reps 3" hd  20x 3"    NecksLevel Rotation  Yellow 20x Yellow 20x   Yellow 20x Yellow 20x   NecksLevel Side Bend ROM Yellow 20x Yellow 20x   Yellow 20x  Yellow 20x   NecksLevel Flex/Ext ROM         Pec Stretch 30 " hold 3x 3x   3x 3x   Pec Minor Stretch 30 " hold  3x       Cervical mobilization rotation with nods  HELD    L only  10x     Cervical mobilization side bend and rotate HELD    L only   10x    Prone cervical extension  HELD    10x             Ther Ex          resist  6 min  resist  6 min ALT  110 resist  6 min  resist  6 min alt  110 resist  6 min ALT   Scalene Stretch 30 " hold 3x pain on L at 3rd rep so stopped 3x   3x    Levator Stretch 30 " hold 3x 3x   3x    ER Pulse 2 min 2 min    2 min   Supine 90 degree ER Stretch 30" hold 3x L only 3x L only   3x L only                               Ther Activity                           Gait Training                           Modalities

## 2023-07-19 NOTE — PROGRESS NOTES
[facility] [address] [facility] coding opportunities       Chart reviewed, no opportunity found: CHART REVIEWED, NO OPPORTUNITY FOUND        Patients Insurance     Medicare Insurance: Medicare

## 2023-07-24 ENCOUNTER — APPOINTMENT (OUTPATIENT)
Dept: PHYSICAL THERAPY | Facility: CLINIC | Age: 43
End: 2023-07-24
Payer: MEDICARE

## 2023-07-24 DIAGNOSIS — G89.21 CHRONIC TRAUMATIC PAIN: ICD-10-CM

## 2023-07-24 DIAGNOSIS — M54.12 CERVICAL RADICULITIS: ICD-10-CM

## 2023-07-24 DIAGNOSIS — M54.12 CERVICAL RADICULOPATHY: ICD-10-CM

## 2023-07-24 DIAGNOSIS — M47.812 CERVICAL SPONDYLOSIS: ICD-10-CM

## 2023-07-24 DIAGNOSIS — G89.4 CHRONIC PAIN SYNDROME: Primary | ICD-10-CM

## 2023-07-25 ENCOUNTER — OFFICE VISIT (OUTPATIENT)
Dept: PHYSICAL THERAPY | Facility: CLINIC | Age: 43
End: 2023-07-25
Payer: MEDICARE

## 2023-07-25 DIAGNOSIS — M54.12 CERVICAL RADICULITIS: Primary | ICD-10-CM

## 2023-07-25 PROCEDURE — 97110 THERAPEUTIC EXERCISES: CPT | Performed by: PHYSICAL THERAPIST

## 2023-07-25 PROCEDURE — 97140 MANUAL THERAPY 1/> REGIONS: CPT | Performed by: PHYSICAL THERAPIST

## 2023-07-25 PROCEDURE — 97112 NEUROMUSCULAR REEDUCATION: CPT | Performed by: PHYSICAL THERAPIST

## 2023-07-25 NOTE — PROGRESS NOTES
Daily Note     Today's date: 2023  Patient name: Osiel Rosa  : 1980  MRN: [id number]  Referring provider: Ki Sam*  Dx:   Encounter Diagnosis     ICD-10-CM    1. Cervical radiculitis  M54.12                      Subjective: Pt reports his pain has decreased since starting PT but he continues to have pain. Objective: See treatment diary below      Assessment: Tolerated treatment well.  Patient exhibited good technique with therapeutic exercises      Plan: Increase resistance on NecksLevel rotation     Precautions: HTN, TBI, PTSD      Manuals 7/17 7/19 7/25  7/5 7/10   MFR to UT, Levators, Scalenes, SOR, Subscapularis 15' 15' 15'  10' 10'   Cervical P->A mobs         Cervical distraction     5'             Neuro Re-Ed         Cervical Retraction  20x 3"  20x 3" 20x 3"  3 sets of 5 reps 3" hd  20x 3"    NecksLevel Rotation  Yellow 20x Yellow 20x Yellow 20x Red Yellow 20x Yellow 20x   NecksLevel Side Bend ROM Yellow 20x Yellow 20x Yellow 20x  Yellow 20x  Yellow 20x   NecksLevel Flex/Ext ROM         Pec Stretch 30 " hold 3x 3x 3x  3x 3x   Pec Minor Stretch 30 " hold  3x 3x      Cervical mobilization rotation with nods  HELD    L only  10x     Cervical mobilization side bend and rotate HELD    L only   10x    Prone cervical extension  HELD    10x             Ther Ex          resist  6 min  resist  6 min  resist  6 min ALT  110 resist  6 min alt  110 resist  6 min ALT   Scalene Stretch 30 " hold 3x pain on L at 3rd rep so stopped 3x 3x  3x    Levator Stretch 30 " hold 3x 3x 3x  3x    ER Pulse 2 min 2 min 2 min 2.5  2 min   Supine 90 degree ER Stretch 30" hold 3x L only 3x L only 3x L only  3x L only    Cross Body Stretch 30" hold                           Ther Activity                           Gait Training                           Modalities

## 2023-07-26 ENCOUNTER — OFFICE VISIT (OUTPATIENT)
Dept: FAMILY MEDICINE CLINIC | Facility: CLINIC | Age: 43
End: 2023-07-26
Payer: MEDICARE

## 2023-07-26 VITALS
HEART RATE: 73 BPM | TEMPERATURE: 96.3 F | BODY MASS INDEX: 35.59 KG/M2 | HEIGHT: 71 IN | SYSTOLIC BLOOD PRESSURE: 138 MMHG | DIASTOLIC BLOOD PRESSURE: 72 MMHG | WEIGHT: 254.2 LBS | OXYGEN SATURATION: 97 %

## 2023-07-26 DIAGNOSIS — M54.12 CERVICAL RADICULITIS: Primary | ICD-10-CM

## 2023-07-26 DIAGNOSIS — M54.2 NECK PAIN: ICD-10-CM

## 2023-07-26 DIAGNOSIS — G89.21 CHRONIC TRAUMATIC PAIN: ICD-10-CM

## 2023-07-26 DIAGNOSIS — M50.223 HERNIATION OF INTERVERTEBRAL DISC AT C6-C7 LEVEL: Primary | ICD-10-CM

## 2023-07-26 DIAGNOSIS — S06.9X0S TRAUMATIC BRAIN INJURY, WITHOUT LOSS OF CONSCIOUSNESS, SEQUELA (HCC): ICD-10-CM

## 2023-07-26 DIAGNOSIS — M50.223 HERNIATION OF INTERVERTEBRAL DISC AT C6-C7 LEVEL: ICD-10-CM

## 2023-07-26 PROCEDURE — 99214 OFFICE O/P EST MOD 30 MIN: CPT | Performed by: STUDENT IN AN ORGANIZED HEALTH CARE EDUCATION/TRAINING PROGRAM

## 2023-07-26 PROCEDURE — 80307 DRUG TEST PRSMV CHEM ANLYZR: CPT | Performed by: STUDENT IN AN ORGANIZED HEALTH CARE EDUCATION/TRAINING PROGRAM

## 2023-07-26 RX ORDER — DULOXETIN HYDROCHLORIDE 20 MG/1
20 CAPSULE, DELAYED RELEASE ORAL DAILY
Qty: 30 CAPSULE | Refills: 0 | Status: SHIPPED | OUTPATIENT
Start: 2023-07-26

## 2023-07-26 RX ORDER — ACETAMINOPHEN AND CODEINE PHOSPHATE 300; 30 MG/1; MG/1
1 TABLET ORAL EVERY 4 HOURS PRN
Qty: 30 TABLET | Refills: 0 | Status: SHIPPED | OUTPATIENT
Start: 2023-07-26

## 2023-07-26 RX ORDER — SENNOSIDES 8.6 MG
650 CAPSULE ORAL EVERY 8 HOURS PRN
Qty: 30 TABLET | Refills: 0 | Status: CANCELLED | OUTPATIENT
Start: 2023-07-26

## 2023-07-26 RX ORDER — DULOXETIN HYDROCHLORIDE 20 MG/1
20 CAPSULE, DELAYED RELEASE ORAL DAILY
Qty: 30 CAPSULE | Refills: 0 | Status: CANCELLED | OUTPATIENT
Start: 2023-07-26

## 2023-07-26 NOTE — PROGRESS NOTES
Assessment/Plan/Follow up information       Diagnosis ICD-10-CM Associated Orders   1. Chronic pain syndrome  G89.4       2. Cervical spondylosis  M47.812       3. Chronic traumatic pain  G89.21       4. Cervical radiculopathy  M54.12       5. Cervical radiculitis  M54.12          On chart review patient is established with pain management Dr. Jessica Chance who is deferring steroid injections as they have helped in the past recommending spinal cord stimulator I also placed a referral for neurosurgery for possible surgical intervention given CT findings. Unfortunately patient unable to undergo MRI secondary to strep medical from his time in the [address]. He has been attending physical therapy with mild alleviation of symptoms    Patient in agreement with the plan, all questions and concerns were answered/addressed. Advised to contact me or the office with any concerns or questions. In the event of an emergency, and unable to contact a provider they are to go to the emergency room. Subjective    HPI: This a pleasant 45-year-old male who presents to the office for evaluation of chronic head neck and back pain secondary to his time in the [address]. Patient has been seeing pain management for quite some time with minimal alleviation of symptoms after multiple steroid injections. Most recently he saw Dr. Jessica Chance who recommended potential spinal cord stimulator and referral to neurosurgery given severity of spine pathology. Patient continues to report severe pain in his back and neck but states the neck pain is worse. He was seen by colleague of mine approximately 3 weeks ago for acute on chronic pain and was prescribed Tylenol with codeine with moderate alleviation of symptoms. Review of Systems   Constitutional: Negative for activity change, appetite change, chills, fatigue and fever.    HENT: Negative for congestion, dental problem, drooling, ear discharge, ear pain, facial swelling, postnasal drip, rhinorrhea and sinus pain. Eyes: Negative for photophobia, pain, discharge and itching. Respiratory: Negative for apnea, cough, chest tightness and shortness of breath. Cardiovascular: Negative for chest pain and leg swelling. Gastrointestinal: Negative for abdominal distention, abdominal pain, anal bleeding, constipation, diarrhea and nausea. Endocrine: Negative for cold intolerance, heat intolerance and polydipsia. Genitourinary: Negative for difficulty urinating. Musculoskeletal: Positive for back pain, gait problem, neck pain and neck stiffness. Negative for arthralgias, joint swelling and myalgias. Skin: Negative for color change and pallor. Allergic/Immunologic: Negative for immunocompromised state. Neurological: Negative for dizziness, seizures, facial asymmetry, weakness, light-headedness, numbness and headaches. Psychiatric/Behavioral: Negative for agitation, behavioral problems, confusion, decreased concentration and dysphoric mood. All other systems reviewed and are negative. Objective    There were no vitals filed for this visit. Physical Exam       Portions of the record may have been created with voice recognition software. Occasional wrong word or "sound a like" substitutions may have occurred due to the inherent limitations of voice recognition software. Read the chart carefully and recognize, using context, where substitutions have occurred. Contact me with any questions.        Ann Lowery MD 07/26/23

## 2023-07-26 NOTE — PROGRESS NOTES
Assessment/Plan/Follow up information       Diagnosis ICD-10-CM Associated Orders   1. Cervical radiculitis  M54.12 acetaminophen-codeine (TYLENOL/CODEINE #3) 300-30 MG per tablet     Toxicology screen, urine      2. Herniation of intervertebral disc at C6-C7 level  M50.223 acetaminophen-codeine (TYLENOL/CODEINE #3) 300-30 MG per tablet     Toxicology screen, urine      3. Chronic traumatic pain  G89.21 acetaminophen-codeine (TYLENOL/CODEINE #3) 300-30 MG per tablet     Toxicology screen, urine      4. Neck pain  M54.2 Toxicology screen, urine      5. Traumatic brain injury, without loss of consciousness, sequela ([address])  S06.9X0S          At this point patient has tried and failed all conservative measures for his chronic back and neck pain. He has been compliant with physical therapy as well as follow-up appointments with specialist.  Given his chronic pain as well as underlying anxiety we will start patient on Cymbalta, he is currently on Zoloft so we will perform a cross taper to avoid side effects. Additionally I had a lengthy discussion with the patient regarding narcotic pain medication. She had good alleviation of pain with Tylenol 3 I will write him a prescription for Tylenol 3 we did have a lengthy discussion that this medication should be used as a as needed basis. We discussed possible complications of this including increased tolerance, dependency and also reviewed controlled substance agreement. Urine toxicology CMS collected in the office today. Patient will follow-up in 1 month or sooner for reevaluation      Patient in agreement with the plan, all questions and concerns were answered/addressed. Advised to contact me or the office with any concerns or questions. In the event of an emergency, and unable to contact a provider they are to go to the emergency room. Subjective    HPI: Pleasant 42-year-old male presents the office for evaluation of chronic back and neck pain.   Patient BSSE completed. Chopped and thins recommended. Will continue to monitor. previously served in [employer] and had multiple injuries since then including severe back and neck pain. He is undergoing multiple imaging modalities and has been following with pain management, PT, spine and pain as well as neurology. He has undergone multiple injections with minimal alleviation of symptoms. He states pain management will no longer provide steroid injections as they were ineffective and they are recommending spinal cord stimulator which patient is not interested in at this time. Additionally pain management advised patient to go to neurosurgery for possible surgical intervention however per neurosurgical notes there is no surgical intervention to be performed in a total patient to go back to pain management. Patient is rightly, frustrated with the medical system because he feels like he is being bounced back and forth without any true treatment of his chronic pain. He was seen by a colleague of luda several weeks ago for acute on chronic pain and was prescribed IM Toradol, steroids as well as Tylenol 3 which provided very good symptomatic relief. Patient states he has tried muscle relaxers, Tylenol, NSAIDs, Lyrica, gabapentin as well as multiple other medications in the past with minimal alleviation of symptoms. Review of Systems   Constitutional: Negative for activity change, appetite change, chills, fatigue and fever. HENT: Negative for congestion, dental problem, drooling, ear discharge, ear pain, facial swelling, postnasal drip, rhinorrhea and sinus pain. Eyes: Negative for photophobia, pain, discharge and itching. Respiratory: Negative for apnea, cough, chest tightness and shortness of breath. Cardiovascular: Negative for chest pain and leg swelling. Gastrointestinal: Negative for abdominal distention, abdominal pain, anal bleeding, constipation, diarrhea and nausea. Endocrine: Negative for cold intolerance, heat intolerance and polydipsia.    Genitourinary: Negative for difficulty urinating. Musculoskeletal: Positive for back pain, gait problem, joint swelling, neck pain and neck stiffness. Negative for arthralgias and myalgias. Skin: Negative for color change and pallor. Allergic/Immunologic: Negative for immunocompromised state. Neurological: Negative for dizziness, seizures, facial asymmetry, weakness, light-headedness, numbness and headaches. Psychiatric/Behavioral: Negative for agitation, behavioral problems, confusion, decreased concentration and dysphoric mood. All other systems reviewed and are negative. Objective    Vitals:    07/26/23 0855   BP: 138/72   Pulse: 73   Temp: (!) 96.3 °F (35.7 °C)   SpO2: 97%         Physical Exam  Constitutional:       Appearance: He is well-developed. He is not ill-appearing or diaphoretic. HENT:      Head: Normocephalic. Eyes:      Pupils: Pupils are equal, round, and reactive to light. Cardiovascular:      Rate and Rhythm: Normal rate and regular rhythm. Pulmonary:      Effort: Pulmonary effort is normal.      Breath sounds: Normal breath sounds. Abdominal:      General: Bowel sounds are normal.      Palpations: Abdomen is soft. Musculoskeletal:         General: Normal range of motion. Cervical back: Normal range of motion and neck supple. Skin:     General: Skin is warm. Neurological:      Mental Status: He is alert. Portions of the record may have been created with voice recognition software. Occasional wrong word or "sound a like" substitutions may have occurred due to the inherent limitations of voice recognition software. Read the chart carefully and recognize, using context, where substitutions have occurred. Contact me with any questions.        Kassandra Remy MD 07/26/23

## 2023-07-30 LAB
AMPHETAMINES UR QL SCN: NEGATIVE NG/ML
BARBITURATES UR QL SCN: NEGATIVE NG/ML
BENZODIAZ UR QL: NEGATIVE NG/ML
BZE UR QL: NEGATIVE NG/ML
CANNABINOIDS UR QL SCN: POSITIVE
METHADONE UR QL SCN: NEGATIVE NG/ML
OPIATES UR QL: NEGATIVE NG/ML
PCP UR QL: NEGATIVE NG/ML
PROPOXYPH UR QL SCN: NEGATIVE NG/ML

## 2023-08-02 ENCOUNTER — EVALUATION (OUTPATIENT)
Dept: PHYSICAL THERAPY | Facility: CLINIC | Age: 43
End: 2023-08-02
Payer: MEDICARE

## 2023-08-02 DIAGNOSIS — M54.12 CERVICAL RADICULITIS: Primary | ICD-10-CM

## 2023-08-02 PROCEDURE — 97140 MANUAL THERAPY 1/> REGIONS: CPT | Performed by: PHYSICAL THERAPIST

## 2023-08-02 PROCEDURE — 97110 THERAPEUTIC EXERCISES: CPT | Performed by: PHYSICAL THERAPIST

## 2023-08-02 NOTE — PROGRESS NOTES
PT Re-Evaluation     Today's date: 2023  Patient name: Gracia Clement  : 1980  MRN: [id number]  Referring provider: Moreno Romano*  Dx:   Encounter Diagnosis     ICD-10-CM    1. Cervical radiculitis  M54.12                      Assessment  Assessment details: Gracia Clement is a 44 y.o. male who presents with pain, decreased strength and decreased ROM. Due to these impairments, patient has difficulty performing ADL's, recreational activities, engaging in social activities, lifting/carrying. Patient demonstrates slow steady improvement in objective measures. He presents with less pain with activities. He reports improving function. Patient's clinical presentation is consistent with their referring diagnosis of Cervical radiculitis  (primary encounter diagnosis). Patient reports Sx's similar to ankylosing spondylosis (onset in 20's, decreased Sx's with activity, Sx's do not improve with rest.. ) Patient may benefit from further testing. Patient has been educated in home exercise program and plan of care. Patient would benefit from skilled physical therapy services to address their aforementioned functional limitations and progress towards prior level of function and independence with home exercise program.     Impairments: abnormal muscle firing, abnormal or restricted ROM, activity intolerance, impaired physical strength, lacks appropriate home exercise program and pain with function  Barriers to therapy: Time since onset, numerous injuries  Understanding of Dx/Px/POC: good   Prognosis: good    Goals  Short Term Goals:    1. Initiate and advance HEP - MET  2. AROM cervical ext 50 degrees -   3. MMT Right shoulder flexion 4/5 -     Long Term Goals:    1. Indep with HEP  2. P! 5/10 at worst - Partially MET  3. Rise from Floor - Partially MET  4.  Lift 20 lbs - Not Attempted    Plan  Patient would benefit from: skilled PT  Planned modality interventions: cryotherapy, electrical stimulation/Russian stimulation and thermotherapy: hydrocollator packs  Planned therapy interventions: joint mobilization, manual therapy, patient education, postural training, activity modification, abdominal trunk stabilization, body mechanics training, flexibility, functional ROM exercises, graded exercise, home exercise program, neuromuscular re-education, strengthening, stretching, therapeutic activities, therapeutic exercise, motor coordination training, muscle pump exercises, gait training, balance/weight bearing training and ADL training  Frequency: 1x week  Duration in weeks: 8  Treatment plan discussed with: patient        Subjective Evaluation    History of Present Illness  Mechanism of injury: Pt reports in  insidious onset of right side neck pain while in [employer]. Pt reports 2 years ago he had an injection in his right side which caused pain on his left. Pt reports historically injections improve his Sx's  Quality of life: fair    Patient Goals  Patient goals for therapy: decreased pain, increased strength, independence with ADLs/IADLs, return to sport/leisure activities and return to work    Pain  Current pain ratin  At best pain ratin  At worst pain ratin  Quality: tight, throbbing, knife-like and sharp (vice like)  Aggravating factors: standing and sitting (driving)  Progression: improved      Diagnostic Tests  X-ray: abnormal  Treatments  Previous treatment: physical therapy, medication, injection treatment, chiropractic and massage  Current treatment: chiropractic, massage, medication and physical therapy        Objective     Static Posture     Head  Forward. Shoulders  Rounded.     Active Range of Motion   Cervical/Thoracic Spine       Cervical    Flexion: 24 degrees   Extension: 15 degrees      Left lateral flexion: 12 degrees      Right lateral flexion: 27 degrees      Left rotation: 51 degrees  Right rotation: 56 degrees         Thoracic    Left rotation: WFL  Right rotation:  [facility]  Left Shoulder   Flexion: 152 degrees   Abduction: 140 degrees   External rotation BTH: T3   Internal rotation BTB: T12     Right Shoulder   Flexion: 131 degrees   Abduction: 125 degrees   External rotation BTH: T3   Internal rotation BTB: L2     Strength/Myotome Testing   Cervical Spine   Neck extension: 2+  Neck flexion: 2+    Left   Neck lateral flexion (C3): 2+    Right   Neck lateral flexion (C3): 2+    Left Shoulder     Planes of Motion   Flexion: 4   Abduction: 4   External rotation at 0°: 4   Internal rotation at 0°: 4     Right Shoulder     Planes of Motion   Flexion: 2+   Abduction: 2+   External rotation at 0°: 3+   Internal rotation at 0°: 2+     Tests     Left Shoulder   Negative ULTT1, ULTT3 and ULTT4. Right Shoulder   Negative ULTT1, ULTT3 and ULTT4.               Precautions: HTN, TBI, PTSD        Manuals 7/17 7/19 7/25  8/2  7/10   MFR to UT, Levators, Scalenes, SOR, Subscapularis 15' 15' 15'  25'  10'   Cervical P->A mobs              Cervical distraction                             Neuro Re-Ed              Cervical Retraction  20x 3"  20x 3" 20x 3"    20x 3"    NecksLevel Rotation  Yellow 20x Yellow 20x Yellow 20x Red  Yellow 20x   NecksLevel Side Bend ROM Yellow 20x Yellow 20x Yellow 20x    Yellow 20x   NecksLevel Flex/Ext ROM              Pec Stretch 30 " hold 3x 3x 3x    3x   Pec Minor Stretch 30 " hold   3x 3x        Cervical mobilization rotation with nods  HELD            Cervical mobilization side bend and rotate HELD            Prone cervical extension  HELD                           Ther Ex               resist  6 min  resist  6 min  resist  6 min ALT  100 resist  6 min ALT  110 resist  6 min ALT   Scalene Stretch 30 " hold 3x pain on L at 3rd rep so stopped 3x 3x        Levator Stretch 30 " hold 3x 3x 3x        ER Pulse 2 min 2 min 2 min 2.5  2 min   Supine 90 degree ER Stretch 30" hold 3x L only 3x L only 3x L only        Cross Body Stretch 30" hold                                            Ther Activity                                            Gait Training                                            Modalities

## 2023-08-04 ENCOUNTER — APPOINTMENT (OUTPATIENT)
Dept: PHYSICAL THERAPY | Facility: CLINIC | Age: 43
End: 2023-08-04
Payer: MEDICARE

## 2023-08-07 ENCOUNTER — OFFICE VISIT (OUTPATIENT)
Dept: PHYSICAL THERAPY | Facility: CLINIC | Age: 43
End: 2023-08-07
Payer: MEDICARE

## 2023-08-07 DIAGNOSIS — M54.12 CERVICAL RADICULITIS: Primary | ICD-10-CM

## 2023-08-07 PROCEDURE — 97110 THERAPEUTIC EXERCISES: CPT

## 2023-08-07 PROCEDURE — 97112 NEUROMUSCULAR REEDUCATION: CPT

## 2023-08-07 PROCEDURE — 97140 MANUAL THERAPY 1/> REGIONS: CPT

## 2023-08-07 NOTE — PROGRESS NOTES
Daily Note     Today's date: 2023  Patient name: Nain Coronel  : 1980  MRN: [id number]  Referring provider: Maci Le*  Dx:   Encounter Diagnosis     ICD-10-CM    1. Cervical radiculitis  M54.12                      Subjective: patient stated he was hurting today not sure if its the weather or the fact he had to do some extended driving yesterday. Objective: See treatment diary below      Assessment: No progression with program due to aforementioned subjective comments. Increased pain in left cervical with right scalene stretch. Edema present superior to mid clavicle on left. Patient able tolerate program well despite high pain levels. Will continue to monitor pain levels and progress as able      Plan: Continue per plan of care.       Precautions: HTN, TBI, PTSD        Manuals     MFR to UT, Levators, Scalenes, SOR, Subscapularis 15' 15' 15'  25' 15'    Cervical P->A mobs             Cervical distraction                           Neuro Re-Ed             Cervical Retraction  20x 3"  20x 3" 20x 3"   20x 3" hd    NecksLevel Rotation  Yellow 20x Yellow 20x Yellow 20x Red Red   20x     NecksLevel Side Bend ROM Yellow 20x Yellow 20x Yellow 20x   Yellow   20x     NecksLevel Flex/Ext ROM             Pec Stretch 30 " hold 3x 3x 3x   3x    Pec Minor Stretch 30 " hold   3x 3x   3x    Cervical mobilization rotation with nods  HELD           Cervical mobilization side bend and rotate HELD           Prone cervical extension  HELD                         Ther Ex              resist  6 min  resist  6 min  resist  6 min ALT  100 resist  6 min  resist  6 min alt     Scalene Stretch 30 " hold 3x pain on L at 3rd rep so stopped 3x 3x   3  See note    Levator Stretch 30 " hold 3x 3x 3x   3x    ER Pulse 2 min 2 min 2 min 2.5 2.5 min     Supine 90 degree ER Stretch 30" hold 3x L only 3x L only 3x L only   3x L only     Cross Body Stretch 30" hold                                         Ther Activity                                         Gait Training                                         Modalities

## 2023-08-14 ENCOUNTER — OFFICE VISIT (OUTPATIENT)
Dept: PHYSICAL THERAPY | Facility: CLINIC | Age: 43
End: 2023-08-14
Payer: MEDICARE

## 2023-08-14 DIAGNOSIS — M54.12 CERVICAL RADICULITIS: Primary | ICD-10-CM

## 2023-08-14 PROCEDURE — 97110 THERAPEUTIC EXERCISES: CPT

## 2023-08-14 PROCEDURE — 97140 MANUAL THERAPY 1/> REGIONS: CPT

## 2023-08-14 PROCEDURE — 97112 NEUROMUSCULAR REEDUCATION: CPT

## 2023-08-14 NOTE — PROGRESS NOTES
Daily Note     Today's date: 2023  Patient name: Nj Ferrara  : 1980  MRN: [id number]  Referring provider: Fernando Dominique*  Dx:   Encounter Diagnosis     ICD-10-CM    1. Cervical radiculitis  M54.12                      Subjective: patient stated he was hurting for 3 days after last session. Patient's current pain level 6/10 on pain scale mostly left side. Patient stated the swelling in his left upper trap worstened after last visit for a couple of days. Kibler Alyson said the swelling has subsided but still present. Patient stated something simpl      Objective: See treatment diary below      Assessment: based on patients subjective comments and previous sessions assessment, held scalene stretch to see if the stretch caused the increased pain. Cervical retractions caused increased pain to left side cervical. Patient's pain overall concentrated on left side cervical.       Plan: Continue per plan of care. Progress treatment as tolerated.        Precautions: HTN, TBI, PTSD        Manuals    MFR to UT, Levators, Scalenes, SOR, Subscapularis 15' 15' 15'  25' 15' 15'   Cervical P->A mobs             Cervical distraction                           Neuro Re-Ed             Cervical Retraction  20x 3"  20x 3" 20x 3"   20x 3" hd 20x 3" hd   NecksLevel Rotation  Yellow 20x Yellow 20x Yellow 20x Red Red   20x  Red   20x    NecksLevel Side Bend ROM Yellow 20x Yellow 20x Yellow 20x   Yellow   20x  Yellow   20x    NecksLevel Flex/Ext ROM             Pec Stretch 30 " hold 3x 3x 3x   3x 3x   Pec Minor Stretch 30 " hold   3x 3x   3x 3x   Cervical mobilization rotation with nods  HELD           Cervical mobilization side bend and rotate HELD           Prone cervical extension  HELD                         Ther Ex              resist  6 min  resist  6 min  resist  6 min ALT  100 resist  6 min  resist  6 min alt  100 resist  6 min alt    Upper trap stretch 30" hd      3x   Scalene Stretch 30 " hold 3x pain on L at 3rd rep so stopped 3x 3x   3  See note Hold x1 visit    Levator Stretch 30 " hold 3x 3x 3x   3x 3x   ER Pulse 2 min 2 min 2 min 2.5 2.5 min  2.5 min   Supine 90 degree ER Stretch 30" hold 3x L only 3x L only 3x L only   3x L only  3x L only    Cross Body Stretch 30" hold                                         Ther Activity                                         Gait Training                                         Modalities

## 2023-08-21 ENCOUNTER — APPOINTMENT (OUTPATIENT)
Dept: PHYSICAL THERAPY | Facility: CLINIC | Age: 43
End: 2023-08-21
Payer: MEDICARE

## 2023-08-30 ENCOUNTER — OFFICE VISIT (OUTPATIENT)
Dept: PHYSICAL THERAPY | Facility: CLINIC | Age: 43
End: 2023-08-30
Payer: MEDICARE

## 2023-08-30 DIAGNOSIS — M54.12 CERVICAL RADICULITIS: Primary | ICD-10-CM

## 2023-08-30 DIAGNOSIS — G89.21 CHRONIC TRAUMATIC PAIN: ICD-10-CM

## 2023-08-30 DIAGNOSIS — M54.12 CERVICAL RADICULITIS: ICD-10-CM

## 2023-08-30 DIAGNOSIS — M50.223 HERNIATION OF INTERVERTEBRAL DISC AT C6-C7 LEVEL: ICD-10-CM

## 2023-08-30 PROCEDURE — 97140 MANUAL THERAPY 1/> REGIONS: CPT

## 2023-08-30 PROCEDURE — 97112 NEUROMUSCULAR REEDUCATION: CPT

## 2023-08-30 PROCEDURE — 97110 THERAPEUTIC EXERCISES: CPT

## 2023-08-30 RX ORDER — ACETAMINOPHEN AND CODEINE PHOSPHATE 300; 30 MG/1; MG/1
1 TABLET ORAL EVERY 4 HOURS PRN
Qty: 30 TABLET | Refills: 0 | Status: SHIPPED | OUTPATIENT
Start: 2023-08-30

## 2023-08-30 RX ORDER — DULOXETIN HYDROCHLORIDE 20 MG/1
20 CAPSULE, DELAYED RELEASE ORAL DAILY
Qty: 30 CAPSULE | Refills: 0 | Status: SHIPPED | OUTPATIENT
Start: 2023-08-30

## 2023-09-05 ENCOUNTER — OFFICE VISIT (OUTPATIENT)
Dept: PHYSICAL THERAPY | Facility: CLINIC | Age: 43
End: 2023-09-05
Payer: MEDICARE

## 2023-09-05 DIAGNOSIS — M54.12 CERVICAL RADICULITIS: Primary | ICD-10-CM

## 2023-09-05 PROCEDURE — 97112 NEUROMUSCULAR REEDUCATION: CPT | Performed by: PHYSICAL THERAPIST

## 2023-09-05 PROCEDURE — 97110 THERAPEUTIC EXERCISES: CPT | Performed by: PHYSICAL THERAPIST

## 2023-09-05 PROCEDURE — 97140 MANUAL THERAPY 1/> REGIONS: CPT | Performed by: PHYSICAL THERAPIST

## 2023-09-05 NOTE — PROGRESS NOTES
Daily Note     Today's date: 2023  Patient name: Balaji Adams  : 1980  MRN: [id number]  Referring provider: Jan Castillo*  Dx:   Encounter Diagnosis     ICD-10-CM    1. Cervical radiculitis  M54.12                      Subjective: Pt reports he has been feeling "pretty okay" since last week. Objective: See treatment diary below      Assessment: Tolerated treatment well. Patient exhibited good technique with therapeutic exercises      Plan: Progress treatment as tolerated.        Precautions: HTN, TBI, PTSD        Manuals 8/30 9/3   8/2 8/7 8/14   MFR to UT, Levators, Scalenes, SOR, Subscapularis 15' 15'   25' 15' 15'   Cervical P->A mobs           Cervical distraction           3x            Neuro Re-Ed           Cervical Retraction  20x 3"  20x 3"    20x 3" hd 20x 3" hd   NecksLevel Rotation  Red    20x Red  20x  Red Red   20x  Red   20x    NecksLevel Side Bend ROM Red    20x Red  20x    Yellow   20x  Yellow   20x    NecksLevel Flex/Ext ROM           Pec Stretch 30 " hold 3x 3x    3x 3x   Pec Minor Stretch 30 " hold 3x 3x    3x 3x   Cervical mobilization rotation with nods           Cervical mobilization side bend and rotate          Prone cervical extension                       Ther Ex            resist  6 min  resist  6 min ALT   100 resist  6 min  resist  6 min alt  100 resist  6 min alt    Upper trap stretch 30" hd 3x 3x    3x   Scalene Stretch 30 " hold 3x 3x    3  See note Hold x1 visit    Levator Stretch 30 " hold 3x 3x    3x 3x   ER Pulse 2 min 3 min  2.5 2.5 min  2.5 min   Supine 90 degree ER Stretch 30" hold 3x L only     3x L only  3x L only    Cross Body Stretch 30" hold   3x ea         UTR                       Ther Activity                                   Gait Training                                   Modalities

## 2023-09-13 ENCOUNTER — OFFICE VISIT (OUTPATIENT)
Dept: FAMILY MEDICINE CLINIC | Facility: CLINIC | Age: 43
End: 2023-09-13
Payer: MEDICARE

## 2023-09-13 VITALS
HEIGHT: 70 IN | TEMPERATURE: 97.9 F | WEIGHT: 253.8 LBS | DIASTOLIC BLOOD PRESSURE: 68 MMHG | SYSTOLIC BLOOD PRESSURE: 138 MMHG | BODY MASS INDEX: 36.33 KG/M2

## 2023-09-13 DIAGNOSIS — M50.223 HERNIATION OF INTERVERTEBRAL DISC AT C6-C7 LEVEL: ICD-10-CM

## 2023-09-13 DIAGNOSIS — E78.5 HYPERLIPIDEMIA, UNSPECIFIED HYPERLIPIDEMIA TYPE: ICD-10-CM

## 2023-09-13 DIAGNOSIS — R00.2 PALPITATIONS: ICD-10-CM

## 2023-09-13 DIAGNOSIS — Z00.00 MEDICARE ANNUAL WELLNESS VISIT, SUBSEQUENT: Primary | ICD-10-CM

## 2023-09-13 DIAGNOSIS — I10 ESSENTIAL (PRIMARY) HYPERTENSION: ICD-10-CM

## 2023-09-13 PROCEDURE — 99214 OFFICE O/P EST MOD 30 MIN: CPT | Performed by: STUDENT IN AN ORGANIZED HEALTH CARE EDUCATION/TRAINING PROGRAM

## 2023-09-13 PROCEDURE — G0439 PPPS, SUBSEQ VISIT: HCPCS | Performed by: STUDENT IN AN ORGANIZED HEALTH CARE EDUCATION/TRAINING PROGRAM

## 2023-09-13 RX ORDER — OMEPRAZOLE 20 MG/1
20 CAPSULE, DELAYED RELEASE ORAL 2 TIMES DAILY
COMMUNITY

## 2023-09-13 RX ORDER — DULOXETIN HYDROCHLORIDE 20 MG/1
40 CAPSULE, DELAYED RELEASE ORAL DAILY
Qty: 60 CAPSULE | Refills: 0 | Status: SHIPPED | OUTPATIENT
Start: 2023-09-13 | End: 2023-10-13

## 2023-09-13 RX ORDER — LISINOPRIL AND HYDROCHLOROTHIAZIDE 12.5; 1 MG/1; MG/1
1 TABLET ORAL DAILY
Qty: 90 TABLET | Refills: 5 | Status: SHIPPED | OUTPATIENT
Start: 2023-09-13

## 2023-09-13 NOTE — PROGRESS NOTES
Assessment and Plan:     Problem List Items Addressed This Visit        Cardiovascular and Mediastinum    Essential (primary) hypertension    Relevant Medications    lisinopril-hydrochlorothiazide (PRINZIDE,ZESTORETIC) 10-12.5 MG per tablet       Musculoskeletal and Integument    Herniation of intervertebral disc at C6-C7 level    Relevant Medications    DULoxetine (CYMBALTA) 20 mg capsule   Other Visit Diagnoses     Medicare annual wellness visit, subsequent    -  Primary    Palpitations        Relevant Medications    lisinopril-hydrochlorothiazide (PRINZIDE,ZESTORETIC) 10-12.5 MG per tablet    Hyperlipidemia, unspecified hyperlipidemia type        Relevant Orders    Lipid Panel with Direct LDL reflex    Comprehensive metabolic panel    CBC and differential      Patient reports mild symptomatic improvement in his back pain with current regiment. States he is feeling well on the 20 mg of Cymbalta but is requesting we can increase this dose. This is a fair plan will increase to 40 mg follow-up to see how he does. Depression Screening and Follow-up Plan: Patient's depression screening was positive with a PHQ-2 score of 3. Their PHQ-9 score was 10. Patient assessed for underlying major depression. Brief counseling provided and recommend additional follow-up/re-evaluation next office visit. Patient advised to follow-up with PCP for further management. Preventive health issues were discussed with patient, and age appropriate screening tests were ordered as noted in patient's After Visit Summary. Personalized health advice and appropriate referrals for health education or preventive services given if needed, as noted in patient's After Visit Summary.      History of Present Illness:     Patient presents for a Medicare Wellness Visit    HPI   Patient Care Team:  Claude Epp, MD as PCP - General (Family Medicine)  Nidia Bruno PA-C as Physician Assistant (Physician Assistant)  Lela MURILLO MD [place] (Pain Medicine)     Review of Systems:     Review of Systems   Constitutional: Negative for activity change, appetite change, chills, fatigue and fever. HENT: Negative for congestion, dental problem, drooling, ear discharge, ear pain, facial swelling, postnasal drip, rhinorrhea and sinus pain. Eyes: Negative for photophobia, pain, discharge and itching. Respiratory: Negative for apnea, cough, chest tightness and shortness of breath. Cardiovascular: Negative for chest pain and leg swelling. Gastrointestinal: Negative for abdominal distention, abdominal pain, anal bleeding, constipation, diarrhea and nausea. Endocrine: Negative for cold intolerance, heat intolerance and polydipsia. Genitourinary: Negative for difficulty urinating. Musculoskeletal: Negative for arthralgias, gait problem, joint swelling and myalgias. Skin: Negative for color change and pallor. Allergic/Immunologic: Negative for immunocompromised state. Neurological: Negative for dizziness, seizures, facial asymmetry, weakness, light-headedness, numbness and headaches. Psychiatric/Behavioral: Negative for agitation, behavioral problems, confusion, decreased concentration and dysphoric mood. All other systems reviewed and are negative.        Problem List:     Patient Active Problem List   Diagnosis   • Degenerative tear of acetabular labrum of right hip   • Essential (primary) hypertension   • ADHD (attention deficit hyperactivity disorder)   • Cervical radiculitis   • Gastroesophageal reflux disease without esophagitis   • Hearing loss   • Herniation of intervertebral disc at C6-C7 level   • Injury of radial nerve   • Internal derangement of knee   • Obesity   • Foraminal stenosis of cervical region   • PTSD (post-traumatic stress disorder)   • TBI (traumatic brain injury) ([address])   • Tinnitus   • Benign essential hypertension   • Obesity, morbid (HCC)   • Cervical radiculopathy   • Chronic traumatic pain   • Neck pain   • Chronic pain syndrome   • Cervical spondylosis      Past Medical and Surgical History:     Past Medical History:   Diagnosis Date   • Fractures    • Head injury    • Hyperlipidemia    • Hypertension    • PTSD (post-traumatic stress disorder)      Past Surgical History:   Procedure Laterality Date   • ANKLE SURGERY     • ARM DEBRIDEMENT     • COLONOSCOPY     • FL MYELOGRAM 2 OR MORE REGIONS  2023   • HIP SURGERY     • KNEE SURGERY     • SHOULDER SURGERY     • WRIST SURGERY        Family History:     Family History   Adopted: Yes      Social History:     Social History     Socioeconomic History   • Marital status: /Civil Union     Spouse name: None   • Number of children: None   • Years of education: None   • Highest education level: None   Occupational History   • None   Tobacco Use   • Smoking status: Former     Packs/day: 0.50     Years: 4.00     Total pack years: 2.00     Types: Cigarettes     Quit date:      Years since quittin.7   • Smokeless tobacco: Former   Vaping Use   • Vaping Use: Some days   • Substances: CBD   Substance and Sexual Activity   • Alcohol use: Not Currently   • Drug use: No   • Sexual activity: None   Other Topics Concern   • None   Social History Narrative   • None     Social Determinants of Health     Financial Resource Strain: Low Risk  (2023)    Overall Financial Resource Strain (CARDIA)    • Difficulty of Paying Living Expenses: Not hard at all   Food Insecurity: Not on file   Transportation Needs: No Transportation Needs (2023)    PRAPARE - Transportation    • Lack of Transportation (Medical): No    • Lack of Transportation (Non-Medical):  No   Physical Activity: Not on file   Stress: Not on file   Social Connections: Not on file   Intimate Partner Violence: Not on file   Housing Stability: Not on file      Medications and Allergies:     Current Outpatient Medications   Medication Sig Dispense Refill   • acetaminophen-codeine (TYLENOL/CODEINE #3) 300-30 MG per tablet Take 1 tablet by mouth every 4 (four) hours as needed for moderate pain 30 tablet 0   • cetirizine (ZyrTEC) 10 mg tablet Take 10 mg by mouth daily     • DULoxetine (CYMBALTA) 20 mg capsule Take 2 capsules (40 mg total) by mouth daily 60 capsule 0   • ibuprofen (MOTRIN) 800 mg tablet Take 1 tablet (800 mg total) by mouth 3 (three) times a day as needed for mild pain 90 tablet 0   • lisinopril-hydrochlorothiazide (PRINZIDE,ZESTORETIC) 10-12.5 MG per tablet Take 1 tablet by mouth daily 90 tablet 5   • loratadine (CLARITIN) 10 mg tablet      • omeprazole (PriLOSEC) 20 mg delayed release capsule Take 20 mg by mouth 2 (two) times a day     • sildenafil (VIAGRA) 100 mg tablet Take 1 tablet (100 mg total) by mouth daily as needed for erectile dysfunction 20 tablet 0   • famotidine (PEPCID) 20 mg tablet 20 mg       No current facility-administered medications for this visit. No Known Allergies   Immunizations:     Immunization History   Administered Date(s) Administered   • DTaP,unspecified 04/18/2013   • INFLUENZA 10/09/2008, 09/09/2013, 09/20/2013, 11/01/2014, 10/01/2017, 10/25/2018, 10/28/2019   • Influenza Split High Dose Preservative Free IM 11/01/2014   • Influenza, seasonal, injectable, preservative free 11/02/2016   • Tdap 01/01/2014, 06/30/2021      Health Maintenance:         Topic Date Due   • Hepatitis C Screening  Never done   • HIV Screening  Never done         Topic Date Due   • COVID-19 Vaccine (1) Never done   • Hepatitis A Vaccine (1 of 2 - Risk 2-dose series) Never done   • Influenza Vaccine (1) 09/01/2023      Medicare Screening Tests and Risk Assessments:     Anaid Soriano is here for his Subsequent Wellness visit. Health Risk Assessment:   Patient rates overall health as fair. Patient feels that their physical health rating is same. Patient is satisfied with their life. Eyesight was rated as same. Hearing was rated as slightly worse.  Patient feels that their emotional and mental health rating is same. Patients states they are never, rarely angry. Patient states they are sometimes unusually tired/fatigued. Pain experienced in the last 7 days has been a lot. Patient's pain rating has been 6/10. Patient states that he has experienced no weight loss or gain in last 6 months. Fall Risk Screening: In the past year, patient has experienced: no history of falling in past year      Home Safety:  Patient does not have trouble with stairs inside or outside of their home. Patient has working smoke alarms and has working carbon monoxide detector. Home safety hazards include: none. Medications:   Patient is not currently taking any over-the-counter supplements. Patient is able to manage medications. Activities of Daily Living (ADLs)/Instrumental Activities of Daily Living (IADLs):   Walk and transfer into and out of bed and chair?: Yes  Dress and groom yourself?: Yes    Bathe or shower yourself?: Yes    Feed yourself? Yes  Do your laundry/housekeeping?: Yes  Manage your money, pay your bills and track your expenses?: Yes  Make your own meals?: Yes    Do your own shopping?: Yes    Previous Hospitalizations:   Any hospitalizations or ED visits within the last 12 months?: No      Advance Care Planning:   Living will: Yes    Durable POA for healthcare:  Yes    Advanced directive: Yes    Advanced directive counseling given: Yes    Five wishes given: Yes    Patient declined ACP directive: No    End of Life Decisions reviewed with patient: Yes    Provider agrees with end of life decisions: Yes      PREVENTIVE SCREENINGS      Cardiovascular Screening:    General: Screening Current      Diabetes Screening:     General: Screening Current      Prostate Cancer Screening:    General: Screening Not Indicated      Abdominal Aortic Aneurysm (AAA) Screening:    Risk factors include: tobacco use        Lung Cancer Screening:     General: Screening Not Indicated    Screening, Brief Intervention, and Referral to Treatment (SBIRT)    Screening      Single Item Drug Screening:  How often have you used an illegal drug (including marijuana) or a prescription medication for non-medical reasons in the past year? never    Single Item Drug Screen Score: 0  Interpretation: Negative screen for possible drug use disorder    Review of Current Opioid Use    Opioid Risk Tool (ORT) Interpretation: Complete Opioid Risk Tool (ORT)    No results found. Physical Exam:     /68 (BP Location: Left arm, Patient Position: Sitting, Cuff Size: Adult)   Temp 97.9 °F (36.6 °C) (Tympanic)   Ht 5' 9.5" (1.765 m)   Wt 115 kg (253 lb 12.8 oz)   BMI 36.94 kg/m²     Physical Exam  Constitutional:       Appearance: He is well-developed. He is obese. He is not ill-appearing or diaphoretic. HENT:      Head: Normocephalic. Right Ear: Tympanic membrane normal.      Left Ear: Tympanic membrane normal.   Eyes:      Pupils: Pupils are equal, round, and reactive to light. Cardiovascular:      Rate and Rhythm: Normal rate and regular rhythm. Pulmonary:      Effort: Pulmonary effort is normal.      Breath sounds: Normal breath sounds. Abdominal:      General: Bowel sounds are normal.      Palpations: Abdomen is soft. Musculoskeletal:         General: Normal range of motion. Cervical back: Normal range of motion and neck supple. Skin:     General: Skin is warm. Neurological:      Mental Status: He is alert.           Zackery Boyer MD

## 2023-09-13 NOTE — PATIENT INSTRUCTIONS
Medicare Preventive Visit Patient Instructions  Thank you for completing your Welcome to Medicare Visit or Medicare Annual Wellness Visit today. Your next wellness visit will be due in one year (9/13/2024). The screening/preventive services that you may require over the next 5-10 years are detailed below. Some tests may not apply to you based off risk factors and/or age. Screening tests ordered at today's visit but not completed yet may show as past due. Also, please note that scanned in results may not display below. Preventive Screenings:  Service Recommendations Previous Testing/Comments   Colorectal Cancer Screening  · Colonoscopy    · Fecal Occult Blood Test (FOBT)/Fecal Immunochemical Test (FIT)  · Fecal DNA/Cologuard Test  · Flexible Sigmoidoscopy Age: 43-73 years old   Colonoscopy: every 10 years (May be performed more frequently if at higher risk)  OR  FOBT/FIT: every 1 year  OR  Cologuard: every 3 years  OR  Sigmoidoscopy: every 5 years  Screening may be recommended earlier than age 39 if at higher risk for colorectal cancer. Also, an individualized decision between you and your healthcare provider will decide whether screening between the ages of 77-80 would be appropriate.  Colonoscopy: Not on file  FOBT/FIT: Not on file  Cologuard: Not on file  Sigmoidoscopy: Not on file          Prostate Cancer Screening Individualized decision between patient and health care provider in men between ages of 53-66   Medicare will cover every 12 months beginning on the day after your 50th birthday PSA: No results in last 5 years     Screening Not Indicated     Hepatitis C Screening Once for adults born between [address] [place] [address]  More frequently in patients at high risk for Hepatitis C Hep C Antibody: Not on file        Diabetes Screening 1-2 times per year if you're at risk for diabetes or have pre-diabetes Fasting glucose: No results in last 5 years (No results in last 5 years)  A1C: No results in last 5 years (No results in last 5 years)  Screening Current   Cholesterol Screening Once every 5 years if you don't have a lipid disorder. May order more often based on risk factors. Lipid panel: 09/19/2022  Screening Current      Other Preventive Screenings Covered by Medicare:  1. Abdominal Aortic Aneurysm (AAA) Screening: covered once if your at risk. You're considered to be at risk if you have a family history of AAA or a male between the age of 70-76 who smoking at least 100 cigarettes in your lifetime. 2. Lung Cancer Screening: covers low dose CT scan once per year if you meet all of the following conditions: (1) Age 48-67; (2) No signs or symptoms of lung cancer; (3) Current smoker or have quit smoking within the last 15 years; (4) You have a tobacco smoking history of at least 20 pack years (packs per day x number of years you smoked); (5) You get a written order from a healthcare provider. 3. Glaucoma Screening: covered annually if you're considered high risk: (1) You have diabetes OR (2) Family history of glaucoma OR (3)  aged 48 and older OR (3)  American aged 72 and older  3. Osteoporosis Screening: covered every 2 years if you meet one of the following conditions: (1) Have a vertebral abnormality; (2) On glucocorticoid therapy for more than 3 months; (3) Have primary hyperparathyroidism; (4) On osteoporosis medications and need to assess response to drug therapy. 5. HIV Screening: covered annually if you're between the age of 14-79. Also covered annually if you are younger than 13 and older than 72 with risk factors for HIV infection. For pregnant patients, it is covered up to 3 times per pregnancy.     Immunizations:  Immunization Recommendations   Influenza Vaccine Annual influenza vaccination during flu season is recommended for all persons aged >= 6 months who do not have contraindications   Pneumococcal Vaccine   * Pneumococcal conjugate vaccine = PCV13 (Prevnar 13), PCV15 (Vaxneuvance), PCV20 (Prevnar 20)  * Pneumococcal polysaccharide vaccine = PPSV23 (Pneumovax) Adults 2364 years old: 1-3 doses may be recommended based on certain risk factors  Adults 72 years old: 1-2 doses may be recommended based off what pneumonia vaccine you previously received   Hepatitis B Vaccine 3 dose series if at intermediate or high risk (ex: diabetes, end stage renal disease, liver disease)   Tetanus (Td) Vaccine - COST NOT COVERED BY MEDICARE PART B Following completion of primary series, a booster dose should be given every 10 years to maintain immunity against tetanus. Td may also be given as tetanus wound prophylaxis. Tdap Vaccine - COST NOT COVERED BY MEDICARE PART B Recommended at least once for all adults. For pregnant patients, recommended with each pregnancy. Shingles Vaccine (Shingrix) - COST NOT COVERED BY MEDICARE PART B  2 shot series recommended in those aged 48 and above     Health Maintenance Due:      Topic Date Due   • Hepatitis C Screening  Never done   • HIV Screening  Never done     Immunizations Due:      Topic Date Due   • COVID-19 Vaccine (1) Never done   • Hepatitis A Vaccine (1 of 2 - Risk 2-dose series) Never done   • Influenza Vaccine (1) 09/01/2023     Advance Directives   What are advance directives? Advance directives are legal documents that state your wishes and plans for medical care. These plans are made ahead of time in case you lose your ability to make decisions for yourself. Advance directives can apply to any medical decision, such as the treatments you want, and if you want to donate organs. What are the types of advance directives? There are many types of advance directives, and each state has rules about how to use them. You may choose a combination of any of the following:  · Living will: This is a written record of the treatment you want. You can also choose which treatments you do not want, which to limit, and which to stop at a certain time.  This includes surgery, medicine, IV fluid, and tube feedings. · Durable power of  for healthcare [facility]): This is a written record that states who you want to make healthcare choices for you when you are unable to make them for yourself. This person, called a proxy, is usually a family member or a friend. You may choose more than 1 proxy. · Do not resuscitate (DNR) order:  A DNR order is used in case your heart stops beating or you stop breathing. It is a request not to have certain forms of treatment, such as CPR. A DNR order may be included in other types of advance directives. · Medical directive: This covers the care that you want if you are in a coma, near death, or unable to make decisions for yourself. You can list the treatments you want for each condition. Treatment may include pain medicine, surgery, blood transfusions, dialysis, IV or tube feedings, and a ventilator (breathing machine). · Values history: This document has questions about your views, beliefs, and how you feel and think about life. This information can help others choose the care that you would choose. Why are advance directives important? An advance directive helps you control your care. Although spoken wishes may be used, it is better to have your wishes written down. Spoken wishes can be misunderstood, or not followed. Treatments may be given even if you do not want them. An advance directive may make it easier for your family to make difficult choices about your care. Depression   Depression  is a medical condition that causes feelings of sadness or hopelessness that do not go away. Depression may cause you to lose interest in things you used to enjoy. These feelings may interfere with your daily life. Call your local emergency number (911 in the [address]) if:   · You think about harming yourself or someone else. · You have done something on purpose to hurt yourself.   The following resources are available at any time to help you, if needed:   · [place]: 1-710-400-840-101-5143 (6-734-892-NNCU)   · Suicide Hotline: 5-156.681.9339 (4-675-CBIJDSC)   · For a list of international numbers: https://save.org/find-help/international-resources/  Treatment for depression may include medicine to relieve depression. Medicine is often used together with therapy. Therapy is a way for you to talk about your feelings and anything that may be causing depression. Therapy can be done alone or in a group. It may also be done with family members or a significant other. · Get regular physical activity. · Create a regular sleep schedule. · Eat a variety of healthy foods. · Do not drink alcohol or use drugs. Weight Management   Why it is important to manage your weight:  Being overweight increases your risk of health conditions such as heart disease, high blood pressure, type 2 diabetes, and certain types of cancer. It can also increase your risk for osteoarthritis, sleep apnea, and other respiratory problems. Aim for a slow, steady weight loss. Even a small amount of weight loss can lower your risk of health problems. How to lose weight safely:  A safe and healthy way to lose weight is to eat fewer calories and get regular exercise. You can lose up about 1 pound a week by decreasing the number of calories you eat by 500 calories each day. Healthy meal plan for weight management:  A healthy meal plan includes a variety of foods, contains fewer calories, and helps you stay healthy. A healthy meal plan includes the following:  · Eat whole-grain foods more often. A healthy meal plan should contain fiber. Fiber is the part of grains, fruits, and vegetables that is not broken down by your body. Whole-grain foods are healthy and provide extra fiber in your diet. Some examples of whole-grain foods are whole-wheat breads and pastas, oatmeal, brown rice, and bulgur. · Eat a variety of vegetables every day.   Include dark, leafy greens such as spinach, kale, henrique greens, and mustard greens. Eat yellow and orange vegetables such as carrots, sweet potatoes, and winter squash. · Eat a variety of fruits every day. Choose fresh or canned fruit (canned in its own juice or light syrup) instead of juice. Fruit juice has very little or no fiber. · Eat low-fat dairy foods. Drink fat-free (skim) milk or 1% milk. Eat fat-free yogurt and low-fat cottage cheese. Try low-fat cheeses such as mozzarella and other reduced-fat cheeses. · Choose meat and other protein foods that are low in fat. Choose beans or other legumes such as split peas or lentils. Choose fish, skinless poultry (chicken or turkey), or lean cuts of red meat (beef or pork). Before you cook meat or poultry, cut off any visible fat. · Use less fat and oil. Try baking foods instead of frying them. Add less fat, such as margarine, sour cream, regular salad dressing and mayonnaise to foods. Eat fewer high-fat foods. Some examples of high-fat foods include french fries, doughnuts, ice cream, and cakes. · Eat fewer sweets. Limit foods and drinks that are high in sugar. This includes candy, cookies, regular soda, and sweetened drinks. Exercise:  Exercise at least 30 minutes per day on most days of the week. Some examples of exercise include walking, biking, dancing, and swimming. You can also fit in more physical activity by taking the stairs instead of the elevator or parking farther away from stores. Ask your healthcare provider about the best exercise plan for you.    Narcotic (Opioid) Safety    Use narcotics safely:  · Take prescribed narcotics exactly as directed  · Do not give narcotics to others or take narcotics that belong to someone else  · Do not mix narcotics without medicines or alcohol  · Do not drive or operate heavy machinery after you take the narcotic  · Monitor for side effects and notify your healthcare provider if you experienced side effects such as nausea, sleepiness, itching, or trouble thinking clearly. Manage constipation:    Constipation is the most common side effect of narcotic medicine. Constipation is when you have hard, dry bowel movements, or you go longer than usual between bowel movements. Tell your healthcare provider about all changes in your bowel movements while you are taking narcotics. He or she may recommend laxative medicine to help you have a bowel movement. He or she may also change the kind of narcotic you are taking, or change when you take it. The following are more ways you can prevent or relieve constipation:    · Drink liquids as directed. You may need to drink extra liquids to help soften and move your bowels. Ask how much liquid to drink each day and which liquids are best for you. · Eat high-fiber foods. This may help decrease constipation by adding bulk to your bowel movements. High-fiber foods include fruits, vegetables, whole-grain breads and cereals, and beans. Your healthcare provider or dietitian can help you create a high-fiber meal plan. Your provider may also recommend a fiber supplement if you cannot get enough fiber from food. · Exercise regularly. Regular physical activity can help stimulate your intestines. Walking is a good exercise to prevent or relieve constipation. Ask which exercises are best for you. · Schedule a time each day to have a bowel movement. This may help train your body to have regular bowel movements. Bend forward while you are on the toilet to help move the bowel movement out. Sit on the toilet for at least 10 minutes, even if you do not have a bowel movement. Store narcotics safely:   · Store narcotics where others cannot easily get them. Keep them in a locked cabinet or secure area. Do not  keep them in a purse or other bag you carry with you. A person may be looking for something else and find the narcotics. · Make sure narcotics are stored out of the reach of children.   A child can easily overdose on narcotics. Narcotics may look like candy to a small child. The best way to dispose of narcotics: The laws vary by country and area. In the [facility], the best way is to return the narcotics through a take-back program. This program is offered by the [employer] ([employer]). The following are options for using the program:  · Take the narcotics to a [employer] collection site. The site is often a law enforcement center. Call your local law enforcement center for scheduled take-back days in your area. You will be given information on where to go if the collection site is in a different location. · Take the narcotics to an approved pharmacy or hospital.  A pharmacy or hospital may be set up as a collection site. You will need to ask if it is a [employer] collection site if you were not directed there. A pharmacy or doctor's office may not be able to take back narcotics unless it is a [employer] site. · Use a mail-back system. This means you are given containers to put the narcotics into. You will then mail them in the containers. · Use a take-back drop box. This is a place to leave the narcotics at any time. People and animals will not be able to get into the box. Your local law enforcement agency can tell you where to find a drop box in your area. Other ways to manage pain:   · Ask your healthcare provider about non-narcotic medicines to control pain. Nonprescription medicines include NSAIDs (such as ibuprofen) and acetaminophen. Prescription medicines include muscle relaxers, antidepressants, and steroids. · Pain may be managed without any medicines. Some ways to relieve pain include massage, aromatherapy, or meditation. Physical or occupational therapy may also help. For more information:   · Drug Enforcement Administration  [address] [facility] , [address]  Phone: 1- 354 - [phone]  Web Address: [employer]/drug_disposal/    · [address] and Drug Administration  [address] [place] [address]  Phone: 3- 011 - [phone]  Web Address: http://[employer]/     © Copyright [address] 2018 Information is for End User's use only and may not be sold, redistributed or otherwise used for commercial purposes.  All illustrations and images included in CareNotes® are the copyrighted property of [employer] Inc. or [address] [place] [address]

## 2023-09-14 ENCOUNTER — EVALUATION (OUTPATIENT)
Dept: PHYSICAL THERAPY | Facility: CLINIC | Age: 43
End: 2023-09-14
Payer: MEDICARE

## 2023-09-14 ENCOUNTER — LAB (OUTPATIENT)
Dept: LAB | Facility: CLINIC | Age: 43
End: 2023-09-14
Payer: MEDICARE

## 2023-09-14 DIAGNOSIS — M54.12 CERVICAL RADICULITIS: Primary | ICD-10-CM

## 2023-09-14 DIAGNOSIS — E78.5 HYPERLIPIDEMIA, UNSPECIFIED HYPERLIPIDEMIA TYPE: ICD-10-CM

## 2023-09-14 DIAGNOSIS — I10 ESSENTIAL (PRIMARY) HYPERTENSION: ICD-10-CM

## 2023-09-14 DIAGNOSIS — R00.2 PALPITATIONS: ICD-10-CM

## 2023-09-14 DIAGNOSIS — Z11.4 SCREENING FOR HUMAN IMMUNODEFICIENCY VIRUS: ICD-10-CM

## 2023-09-14 DIAGNOSIS — Z11.59 NEED FOR HEPATITIS C SCREENING TEST: ICD-10-CM

## 2023-09-14 LAB
ALBUMIN SERPL BCP-MCNC: 4.5 G/DL (ref 3.5–5)
ALP SERPL-CCNC: 68 U/L (ref 34–104)
ALT SERPL W P-5'-P-CCNC: 34 U/L (ref 7–52)
ANION GAP SERPL CALCULATED.3IONS-SCNC: 9 MMOL/L
AST SERPL W P-5'-P-CCNC: 20 U/L (ref 13–39)
BASOPHILS # BLD AUTO: 0.06 THOUSANDS/ÂΜL (ref 0–0.1)
BASOPHILS NFR BLD AUTO: 1 % (ref 0–1)
BILIRUB SERPL-MCNC: 0.43 MG/DL (ref 0.2–1)
BUN SERPL-MCNC: 18 MG/DL (ref 5–25)
CALCIUM SERPL-MCNC: 9.3 MG/DL (ref 8.4–10.2)
CHLORIDE SERPL-SCNC: 103 MMOL/L (ref 96–108)
CHOLEST SERPL-MCNC: 244 MG/DL
CO2 SERPL-SCNC: 27 MMOL/L (ref 21–32)
CREAT SERPL-MCNC: 0.84 MG/DL (ref 0.6–1.3)
CREAT UR-MCNC: 143.2 MG/DL
EOSINOPHIL # BLD AUTO: 0.29 THOUSAND/ÂΜL (ref 0–0.61)
EOSINOPHIL NFR BLD AUTO: 4 % (ref 0–6)
ERYTHROCYTE [DISTWIDTH] IN BLOOD BY AUTOMATED COUNT: 12.8 % (ref 11.6–15.1)
GFR SERPL CREATININE-BSD FRML MDRD: 107 ML/MIN/1.73SQ M
GLUCOSE P FAST SERPL-MCNC: 101 MG/DL (ref 65–99)
HCT VFR BLD AUTO: 45.8 % (ref 36.5–49.3)
HCV AB SER QL: NORMAL
HDLC SERPL-MCNC: 39 MG/DL
HGB BLD-MCNC: 15.5 G/DL (ref 12–17)
HIV 1+2 AB+HIV1 P24 AG SERPL QL IA: NORMAL
HIV 2 AB SERPL QL IA: NORMAL
HIV1 AB SERPL QL IA: NORMAL
HIV1 P24 AG SERPL QL IA: NORMAL
IMM GRANULOCYTES # BLD AUTO: 0.01 THOUSAND/UL (ref 0–0.2)
IMM GRANULOCYTES NFR BLD AUTO: 0 % (ref 0–2)
LDLC SERPL CALC-MCNC: 172 MG/DL (ref 0–100)
LYMPHOCYTES # BLD AUTO: 2.05 THOUSANDS/ÂΜL (ref 0.6–4.47)
LYMPHOCYTES NFR BLD AUTO: 29 % (ref 14–44)
MCH RBC QN AUTO: 29 PG (ref 26.8–34.3)
MCHC RBC AUTO-ENTMCNC: 33.8 G/DL (ref 31.4–37.4)
MCV RBC AUTO: 86 FL (ref 82–98)
MICROALBUMIN UR-MCNC: <7 MG/L
MICROALBUMIN/CREAT 24H UR: <5 MG/G CREATININE (ref 0–30)
MONOCYTES # BLD AUTO: 0.56 THOUSAND/ÂΜL (ref 0.17–1.22)
MONOCYTES NFR BLD AUTO: 8 % (ref 4–12)
NEUTROPHILS # BLD AUTO: 4.06 THOUSANDS/ÂΜL (ref 1.85–7.62)
NEUTS SEG NFR BLD AUTO: 58 % (ref 43–75)
NRBC BLD AUTO-RTO: 0 /100 WBCS
PLATELET # BLD AUTO: 367 THOUSANDS/UL (ref 149–390)
PMV BLD AUTO: 9.8 FL (ref 8.9–12.7)
POTASSIUM SERPL-SCNC: 4.3 MMOL/L (ref 3.5–5.3)
PROT SERPL-MCNC: 7 G/DL (ref 6.4–8.4)
RBC # BLD AUTO: 5.34 MILLION/UL (ref 3.88–5.62)
SODIUM SERPL-SCNC: 139 MMOL/L (ref 135–147)
TRIGL SERPL-MCNC: 165 MG/DL
WBC # BLD AUTO: 7.03 THOUSAND/UL (ref 4.31–10.16)

## 2023-09-14 PROCEDURE — 86803 HEPATITIS C AB TEST: CPT

## 2023-09-14 PROCEDURE — 80061 LIPID PANEL: CPT

## 2023-09-14 PROCEDURE — 97110 THERAPEUTIC EXERCISES: CPT | Performed by: PHYSICAL THERAPIST

## 2023-09-14 PROCEDURE — 85025 COMPLETE CBC W/AUTO DIFF WBC: CPT

## 2023-09-14 PROCEDURE — 97140 MANUAL THERAPY 1/> REGIONS: CPT | Performed by: PHYSICAL THERAPIST

## 2023-09-14 PROCEDURE — 80053 COMPREHEN METABOLIC PANEL: CPT

## 2023-09-14 PROCEDURE — 36415 COLL VENOUS BLD VENIPUNCTURE: CPT

## 2023-09-14 PROCEDURE — 87389 HIV-1 AG W/HIV-1&-2 AB AG IA: CPT

## 2023-09-18 DIAGNOSIS — E78.5 HYPERLIPIDEMIA, UNSPECIFIED HYPERLIPIDEMIA TYPE: Primary | ICD-10-CM

## 2023-09-18 RX ORDER — ATORVASTATIN CALCIUM 40 MG/1
40 TABLET, FILM COATED ORAL DAILY
Qty: 90 TABLET | Refills: 0 | Status: SHIPPED | OUTPATIENT
Start: 2023-09-18

## 2023-09-20 ENCOUNTER — OFFICE VISIT (OUTPATIENT)
Dept: PHYSICAL THERAPY | Facility: CLINIC | Age: 43
End: 2023-09-20
Payer: MEDICARE

## 2023-09-20 DIAGNOSIS — M54.12 CERVICAL RADICULITIS: Primary | ICD-10-CM

## 2023-09-20 PROCEDURE — 97140 MANUAL THERAPY 1/> REGIONS: CPT

## 2023-09-20 PROCEDURE — 97112 NEUROMUSCULAR REEDUCATION: CPT

## 2023-09-20 PROCEDURE — 97110 THERAPEUTIC EXERCISES: CPT

## 2023-09-20 NOTE — PROGRESS NOTES
Daily Note     Today's date: 2023  Patient name: Dominick Barnes  : 1980  MRN: [id number]  Referring provider: Daja Giron*  Dx:   Encounter Diagnosis     ICD-10-CM    1. Cervical radiculitis  M54.12                      Subjective: patient reported that the pain has shifted all to the right side and not the left. The pain on the left side that he was experiencing since the injection has subsided and the pain on the right is his typical pain. Tre Martinez mention he did notice some swelling on his left side clavical region but not as bad as it has been       Objective: See treatment diary below      Assessment: patient has been working on exercises as indicated in [address] [place] [address] focusing on cervical pain. Patient continues to be challenged by current program. Patient demonstrated the proper amount of fatigue for program. Will continue to monitor pain and endurance and adjust program as needed      Plan: Continue per plan of care. Progress treatment as tolerated.        Precautions: HTN, TBI, PTSD        Manuals 8/30 9/3  9/14 9/20  8/14   MFR to UT, Levators, Scalenes, SOR, Subscapularis 15' 15'  15' and right thoracic paraspinals 15' right side only  15'   Cervical P->A mobs             Cervical distraction             3x              Neuro Re-Ed             Cervical Retraction  20x 3"  20x 3"   20x 3" hd  supine  20x 3" hd   NecksLevel Rotation  Red    20x Red  20x   Red   20x  Red   20x    NecksLevel Side Bend ROM Red    20x Red  20x   Red   20x   Yellow   20x    NecksLevel Flex/Ext ROM             Pec Stretch 30 " hold 3x 3x   3x  3x   Pec Minor Stretch 30 " hold 3x 3x   3x  3x   Cervical mobilization rotation with nods              Cervical mobilization side bend and rotate             Prone cervical extension                            Ther Ex              resist  6 min  resist  6 min ALT  100 resist  6 min  resist  6 min alt   100 resist  6 min alt    Upper trap stretch 30" hd 3x 3x   3x  3x   Scalene Stretch 30 " hold 3x 3x   3x  Hold x1 visit    Levator Stretch 30 " hold 3x 3x   3x  3x   ER Pulse 2 min 3 min  3 min 3 min  2.5 min   Supine 90 degree ER Stretch 30" hold 3x L only     3x L only   3x L only    Cross Body Stretch 30" hold   3x ea  3x ea 3x       UTR                           Ther Activity                                         Gait Training                                         Modalities

## 2023-09-28 ENCOUNTER — OFFICE VISIT (OUTPATIENT)
Dept: PHYSICAL THERAPY | Facility: CLINIC | Age: 43
End: 2023-09-28
Payer: MEDICARE

## 2023-09-28 DIAGNOSIS — M54.12 CERVICAL RADICULITIS: Primary | ICD-10-CM

## 2023-09-28 PROCEDURE — 97140 MANUAL THERAPY 1/> REGIONS: CPT

## 2023-09-28 PROCEDURE — 97110 THERAPEUTIC EXERCISES: CPT

## 2023-09-28 PROCEDURE — 97112 NEUROMUSCULAR REEDUCATION: CPT

## 2023-09-28 NOTE — PROGRESS NOTES
Daily Note     Today's date: 2023  Patient name: Rashaad Castro  : 1980  MRN: [id number]  Referring provider: Isha Lawrence*  Dx:   Encounter Diagnosis     ICD-10-CM    1. Cervical radiculitis  M54.12                      Subjective: patient stated she is feeling pretty good today but the drive to the clinic flares him up. Patient stated it takes usually about 15 minutes of driving before his right side hurts him. Patient stated he does not use right arm to drive but he just rests it on arm rest and it pushes up his shoulder up which causes pain. Patient stated he puts his arm up on the passenger seat and applies pressure to get some relief      Objective: See treatment diary below      Assessment: added posterior capsule stretch to program to address subjective comments. Patient indicated the stretch was hitting the areas he feels pain. No adverse effects from addition of stretch. Overall patient did well with rest of program      Plan: Continue per plan of care. Progress treatment as tolerated.        Precautions: HTN, TBI, PTSD        Manuals 8/30 9/3  9/14 9/20 9/28    MFR to UT, Levators, Scalenes, SOR, Subscapularis 15' 15'  15' and right thoracic paraspinals 15' right side only     Cervical P->A mobs            Cervical distraction            3x             Neuro Re-Ed            Cervical Retraction  20x 3"  20x 3"   20x 3" hd  supine 20x 3" hd  Supine      NecksLevel Rotation  Red    20x Red  20x   Red   20x     NecksLevel Side Bend ROM Red    20x Red  20x   Red   20x      NecksLevel Flex/Ext ROM            Pec Stretch 30 " hold 3x 3x   3x 3x    Pec Minor Stretch 30 " hold 3x 3x   3x 3x    Cervical mobilization rotation with nods             Cervical mobilization side bend and rotate            Prone cervical extension                          Ther Ex             resist  6 min  resist  6 min ALT  100 resist  6 min  resist  6 min alt  100 resist  6 min alt Upper trap stretch 30" hd 3x 3x   3x 3x    Scalene Stretch 30 " hold 3x 3x   3x 3x    Levator Stretch 30 " hold 3x 3x   3x 3x    ER Pulse 2 min 3 min  3 min 3 min 3 min    Supine 90 degree ER Stretch 30" hold 3x L only     3x L only  3x    Cross Body Stretch 30" hold   3x ea  3x ea 3x 3x     UTR             posterior capsule stretch 30" hd right only        3x    Ther Activity                                      Gait Training                                       Modalities

## 2023-10-02 DIAGNOSIS — G89.21 CHRONIC TRAUMATIC PAIN: ICD-10-CM

## 2023-10-02 DIAGNOSIS — M50.223 HERNIATION OF INTERVERTEBRAL DISC AT C6-C7 LEVEL: ICD-10-CM

## 2023-10-02 DIAGNOSIS — M54.12 CERVICAL RADICULITIS: ICD-10-CM

## 2023-10-03 ENCOUNTER — EVALUATION (OUTPATIENT)
Dept: PHYSICAL THERAPY | Facility: CLINIC | Age: 43
End: 2023-10-03
Payer: MEDICARE

## 2023-10-03 DIAGNOSIS — M54.12 CERVICAL RADICULITIS: Primary | ICD-10-CM

## 2023-10-03 PROCEDURE — 97110 THERAPEUTIC EXERCISES: CPT | Performed by: PHYSICAL THERAPIST

## 2023-10-03 PROCEDURE — 97140 MANUAL THERAPY 1/> REGIONS: CPT | Performed by: PHYSICAL THERAPIST

## 2023-10-03 RX ORDER — ACETAMINOPHEN AND CODEINE PHOSPHATE 300; 30 MG/1; MG/1
1 TABLET ORAL EVERY 4 HOURS PRN
Qty: 30 TABLET | Refills: 0 | Status: SHIPPED | OUTPATIENT
Start: 2023-10-03

## 2023-10-03 NOTE — PROGRESS NOTES
PT Re-Evaluation     Today's date: 10/3/2023  Patient name: Viki Cai  : 1980  MRN: [id number]  Referring provider: Lavonia Prader*  Dx:   Encounter Diagnosis     ICD-10-CM    1. Cervical radiculitis  M54.12                      Assessment  Assessment details: Viki Cai is a 44 y.o. male who presents with pain, decreased strength and decreased ROM. Patient reports generally improved Sx's stating his is happy with his current level of function and pain. He is ready to be discharged to [address]. Impairments: abnormal muscle firing, abnormal or restricted ROM, activity intolerance, impaired physical strength, lacks appropriate home exercise program and pain with function  Barriers to therapy: Time since onset, numerous injuries  Understanding of Dx/Px/POC: good   Prognosis: good    Goals  Short Term Goals:    1. Initiate and advance HEP - MET  2. AROM cervical ext 50 degrees - Partially MET  3. MMT Right shoulder flexion 4/5 - Partially MET    Long Term Goals:    1. Indep with HEP  2. P! 5/10 at worst - Partially MET  3. Rise from Floor - Not Attempted  4. Lift 20 lbs - Not Attempted    Plan  Plan details: [facility]  Planned therapy interventions: home exercise program  Treatment plan discussed with: patient        Subjective Evaluation    History of Present Illness  Mechanism of injury: Pt reports in  insidious onset of right side neck pain while in [place]. Pt reports 2 years ago he had an injection in his right side which caused pain on his left.   Pt reports historically injections improve his Sx's  Quality of life: fair    Patient Goals  Patient goals for therapy: decreased pain, increased strength, independence with ADLs/IADLs, return to sport/leisure activities and return to work    Pain  Current pain ratin  At best pain ratin  At worst pain ratin  Quality: tight, throbbing, knife-like and sharp  Aggravating factors: standing and sitting (driving)  Progression: improved      Diagnostic Tests  X-ray: abnormal  Treatments  Previous treatment: physical therapy, medication, injection treatment, chiropractic and massage  Current treatment: chiropractic, massage, medication and physical therapy        Objective     Static Posture     Head  Forward. Shoulders  Rounded. Active Range of Motion   Cervical/Thoracic Spine       Cervical    Flexion: 19 degrees   Extension: 21 degrees      Left lateral flexion: 12 degrees      Right lateral flexion: 20 degrees      Left rotation: 60 degrees  Right rotation: 63 degrees         Thoracic    Left rotation:  WFL  Right rotation:  WFL  Left Shoulder   Flexion: 152 degrees   Abduction: 140 degrees   External rotation BTH: T3   Internal rotation BTB: T8     Right Shoulder   Flexion: 144 degrees   Abduction: 113 degrees   External rotation BTH: T3   Internal rotation BTB: T12     Strength/Myotome Testing   Cervical Spine   Neck extension: 2+  Neck flexion: 2+    Left   Neck lateral flexion (C3): 2+    Right   Neck lateral flexion (C3): 2+    Left Shoulder     Planes of Motion   Flexion: 4   Abduction: 4   External rotation at 0°: 4   Internal rotation at 0°: 4     Right Shoulder     Planes of Motion   Flexion: 2+   Abduction: 2+   External rotation at 0°: 4-   Internal rotation at 0°: 4-     Tests     Left Shoulder   Negative ULTT1, ULTT3 and ULTT4.      Right Shoulder   Negative ULTT1, ULTT3 and ULTT4.               Precautions: HTN, TBI, PTSD        Manuals 8/30 9/3  9/14 9/20 9/28  10/3   MFR to UT, Levators, Scalenes, SOR, 10/3Subscapularis 15' 15'  15' and right thoracic paraspinals 15' right side only       Cervical P->A mobs               Cervical distraction               3x                Neuro Re-Ed               Cervical Retraction  20x 3"  20x 3"   20x 3" hd  supine 20x 3" hd  Supine        NecksLevel Rotation  Red    20x Red  20x   Red   20x       NecksLevel Side Bend ROM Red    20x Red  20x   Red   20x        NecksLevel Flex/Ext ROM               Pec Stretch 30 " hold 3x 3x   3x 3x     Pec Minor Stretch 30 " hold 3x 3x   3x 3x     Cervical mobilization rotation with nods                Cervical mobilization side bend and rotate               Prone cervical extension                                Ther Ex                resist  6 min  resist  6 min ALT  100 resist  6 min  resist  6 min alt  100 resist  6 min alt   100 resist  6 min ALT   Upper trap stretch 30" hd 3x 3x   3x 3x     Scalene Stretch 30 " hold 3x 3x   3x 3x     Levator Stretch 30 " hold 3x 3x   3x 3x     ER Pulse 2 min 3 min  3 min 3 min 3 min     Supine 90 degree ER Stretch 30" hold 3x L only     3x L only  3x     Cross Body Stretch 30" hold   3x ea  3x ea 3x 3x      UTR                posterior capsule stretch 30" hd right only         3x     Ther Activity                                               Gait Training                                               Modalities

## 2023-11-02 DIAGNOSIS — M54.12 CERVICAL RADICULITIS: ICD-10-CM

## 2023-11-02 DIAGNOSIS — G89.21 CHRONIC TRAUMATIC PAIN: ICD-10-CM

## 2023-11-02 DIAGNOSIS — M50.223 HERNIATION OF INTERVERTEBRAL DISC AT C6-C7 LEVEL: ICD-10-CM

## 2023-11-02 RX ORDER — ACETAMINOPHEN AND CODEINE PHOSPHATE 300; 30 MG/1; MG/1
1 TABLET ORAL EVERY 4 HOURS PRN
Qty: 30 TABLET | Refills: 0 | Status: SHIPPED | OUTPATIENT
Start: 2023-11-02

## 2023-12-04 DIAGNOSIS — G89.21 CHRONIC TRAUMATIC PAIN: ICD-10-CM

## 2023-12-04 DIAGNOSIS — M50.223 HERNIATION OF INTERVERTEBRAL DISC AT C6-C7 LEVEL: ICD-10-CM

## 2023-12-04 DIAGNOSIS — M54.12 CERVICAL RADICULITIS: ICD-10-CM

## 2023-12-04 RX ORDER — ACETAMINOPHEN AND CODEINE PHOSPHATE 300; 30 MG/1; MG/1
1 TABLET ORAL EVERY 4 HOURS PRN
Qty: 30 TABLET | Refills: 0 | Status: SHIPPED | OUTPATIENT
Start: 2023-12-04

## 2024-01-03 DIAGNOSIS — G89.21 CHRONIC TRAUMATIC PAIN: ICD-10-CM

## 2024-01-03 DIAGNOSIS — M54.12 CERVICAL RADICULITIS: ICD-10-CM

## 2024-01-03 DIAGNOSIS — M50.223 HERNIATION OF INTERVERTEBRAL DISC AT C6-C7 LEVEL: ICD-10-CM

## 2024-01-03 RX ORDER — ACETAMINOPHEN AND CODEINE PHOSPHATE 300; 30 MG/1; MG/1
1 TABLET ORAL EVERY 4 HOURS PRN
Qty: 30 TABLET | Refills: 0 | Status: SHIPPED | OUTPATIENT
Start: 2024-01-03 | End: 2024-01-04 | Stop reason: SDUPTHER

## 2024-01-03 NOTE — TELEPHONE ENCOUNTER
Will refill 1 last time however patient is overdue for his 3-month controlled substance appointment.

## 2024-01-03 NOTE — TELEPHONE ENCOUNTER
Hi, this is [facility] in [place], Phone number [phone], calling regarding a prescription that was sent in for patient Manuel Kwok birthday to 1980 for the acetaminophen with codeine 300 slash 30 milligrams. I do not have this medication in stock. It's been on a back order and our pharmacy cannot get it in stock on the prescription can't be transferred since it's an original. So it would have to be sent into a pharmacy that actually has it in stock. Please give me a call with any questions. Again, [facility] [phone]. Thank you.  aarti Alegre afternoon. This is Manuel Salas. You guys had put through a medication refill for me this morning. I got a call from the pharmacy, the [facility] and they said that they were out of the Tylenol with Codeine and she also said I guess it's on like long term back order. Didn't know that was really a thing with the medication. But she said she did reach out to you guys as well and I've left a message. So just reaching out and just kind of seeing where how we kind of deal with that [phone]. Thank you so much.

## 2024-01-05 RX ORDER — ACETAMINOPHEN AND CODEINE PHOSPHATE 300; 30 MG/1; MG/1
1 TABLET ORAL EVERY 4 HOURS PRN
Qty: 30 TABLET | Refills: 0 | Status: SHIPPED | OUTPATIENT
Start: 2024-01-05

## 2024-02-05 DIAGNOSIS — G89.21 CHRONIC TRAUMATIC PAIN: ICD-10-CM

## 2024-02-05 DIAGNOSIS — M50.223 HERNIATION OF INTERVERTEBRAL DISC AT C6-C7 LEVEL: ICD-10-CM

## 2024-02-05 DIAGNOSIS — M54.12 CERVICAL RADICULITIS: ICD-10-CM

## 2024-02-05 RX ORDER — ACETAMINOPHEN AND CODEINE PHOSPHATE 300; 30 MG/1; MG/1
1 TABLET ORAL EVERY 4 HOURS PRN
Qty: 30 TABLET | Refills: 0 | OUTPATIENT
Start: 2024-02-05

## 2024-02-07 ENCOUNTER — OFFICE VISIT (OUTPATIENT)
Dept: FAMILY MEDICINE CLINIC | Facility: CLINIC | Age: 44
End: 2024-02-07
Payer: MEDICARE

## 2024-02-07 VITALS
HEIGHT: 70 IN | DIASTOLIC BLOOD PRESSURE: 90 MMHG | TEMPERATURE: 96.6 F | HEART RATE: 64 BPM | BODY MASS INDEX: 35.07 KG/M2 | WEIGHT: 245 LBS | OXYGEN SATURATION: 98 % | SYSTOLIC BLOOD PRESSURE: 118 MMHG

## 2024-02-07 DIAGNOSIS — M48.02 FORAMINAL STENOSIS OF CERVICAL REGION: ICD-10-CM

## 2024-02-07 DIAGNOSIS — M54.12 CERVICAL RADICULOPATHY: ICD-10-CM

## 2024-02-07 DIAGNOSIS — G89.4 CHRONIC PAIN SYNDROME: ICD-10-CM

## 2024-02-07 DIAGNOSIS — G89.21 CHRONIC TRAUMATIC PAIN: ICD-10-CM

## 2024-02-07 DIAGNOSIS — M54.12 CERVICAL RADICULITIS: ICD-10-CM

## 2024-02-07 DIAGNOSIS — S06.9X0S TRAUMATIC BRAIN INJURY, WITHOUT LOSS OF CONSCIOUSNESS, SEQUELA (HCC): ICD-10-CM

## 2024-02-07 DIAGNOSIS — M50.223 HERNIATION OF INTERVERTEBRAL DISC AT C6-C7 LEVEL: ICD-10-CM

## 2024-02-07 DIAGNOSIS — I10 ESSENTIAL (PRIMARY) HYPERTENSION: ICD-10-CM

## 2024-02-07 DIAGNOSIS — F11.20 CONTINUOUS OPIOID DEPENDENCE (HCC): ICD-10-CM

## 2024-02-07 DIAGNOSIS — Z23 ENCOUNTER FOR IMMUNIZATION: Primary | ICD-10-CM

## 2024-02-07 DIAGNOSIS — E66.01 OBESITY, MORBID (HCC): ICD-10-CM

## 2024-02-07 PROCEDURE — 99214 OFFICE O/P EST MOD 30 MIN: CPT | Performed by: STUDENT IN AN ORGANIZED HEALTH CARE EDUCATION/TRAINING PROGRAM

## 2024-02-07 RX ORDER — ACETAMINOPHEN AND CODEINE PHOSPHATE 300; 30 MG/1; MG/1
1 TABLET ORAL EVERY 4 HOURS PRN
Qty: 30 TABLET | Refills: 0 | Status: SHIPPED | OUTPATIENT
Start: 2024-02-07

## 2024-02-07 NOTE — PROGRESS NOTES
Name: Manuel Salas      : 1980      MRN: [id number]  Encounter Provider: Gigi Ritchie MD  Encounter Date: 2024   Encounter department: [facility] PRIMARY CARE    Assessment & Plan     1. Encounter for immunization    2. Essential (primary) hypertension    3. Cervical radiculitis    4. Foraminal stenosis of cervical region    5. Cervical radiculopathy    6. Chronic pain syndrome    7. Continuous opioid dependence (HCC)    8. Obesity, morbid (HCC)    9. Traumatic brain injury, without loss of consciousness, sequela (HCC)    10. Herniation of intervertebral disc at C6-C7 level    11. Chronic traumatic pain           Subjective      HPI    Reports stable back symptoms/pain.  No new issues or concerns. Has been going to PT with good improvement.   Takes tylenol 3 as needed, uses about 1 a day. No constipation/sedation/dizziness.       Review of Systems   Constitutional:  Negative for activity change, appetite change, chills, fatigue and fever.   HENT:  Negative for congestion, dental problem, drooling, ear discharge, ear pain, facial swelling, postnasal drip, rhinorrhea and sinus pain.    Eyes:  Negative for photophobia, pain, discharge and itching.   Respiratory:  Negative for apnea, cough, chest tightness and shortness of breath.    Cardiovascular:  Negative for chest pain and leg swelling.   Gastrointestinal:  Negative for abdominal distention, abdominal pain, anal bleeding, constipation, diarrhea and nausea.   Endocrine: Negative for cold intolerance, heat intolerance and polydipsia.   Genitourinary:  Negative for difficulty urinating.   Musculoskeletal:  Positive for back pain and gait problem. Negative for arthralgias, joint swelling and myalgias.   Skin:  Negative for color change and pallor.   Allergic/Immunologic: Negative for immunocompromised state.   Neurological:  Negative for dizziness, seizures, facial asymmetry, weakness, light-headedness, numbness and headaches.  "  Psychiatric/Behavioral:  Negative for agitation, behavioral problems, confusion, decreased concentration and dysphoric mood.    All other systems reviewed and are negative.      Current Outpatient Medications on File Prior to Visit   Medication Sig    acetaminophen-codeine (TYLENOL/CODEINE #3) 300-30 MG per tablet Take 1 tablet by mouth every 4 (four) hours as needed for moderate pain    cetirizine (ZyrTEC) 10 mg tablet Take 10 mg by mouth daily    DULoxetine (CYMBALTA) 20 mg capsule Take 2 capsules (40 mg total) by mouth daily    ibuprofen (MOTRIN) 800 mg tablet Take 1 tablet (800 mg total) by mouth 3 (three) times a day as needed for mild pain    loratadine (CLARITIN) 10 mg tablet     omeprazole (PriLOSEC) 20 mg delayed release capsule Take 20 mg by mouth 2 (two) times a day    sildenafil (VIAGRA) 100 mg tablet Take 1 tablet (100 mg total) by mouth daily as needed for erectile dysfunction    [DISCONTINUED] atorvastatin (LIPITOR) 40 mg tablet Take 1 tablet (40 mg total) by mouth daily (Patient not taking: Reported on 2/7/2024)    [DISCONTINUED] famotidine (PEPCID) 20 mg tablet 20 mg (Patient not taking: Reported on 2/7/2024)    [DISCONTINUED] lisinopril-hydrochlorothiazide (PRINZIDE,ZESTORETIC) 10-12.5 MG per tablet Take 1 tablet by mouth daily (Patient not taking: Reported on 2/7/2024)       Objective     /90 (BP Location: Left arm, Patient Position: Sitting, Cuff Size: Large)   Pulse 64   Temp (!) 96.6 °F (35.9 °C) (Tympanic)   Ht 5' 9.5\" (1.765 m)   Wt 111 kg (245 lb)   SpO2 98%   BMI 35.66 kg/m²     Physical Exam  Vitals and nursing note reviewed.   Constitutional:       Appearance: He is well-developed.   HENT:      Head: Normocephalic and atraumatic.   Eyes:      Conjunctiva/sclera: Conjunctivae normal.      Pupils: Pupils are equal, round, and reactive to light.   Neck:      Thyroid: No thyromegaly.      Vascular: No JVD.      Trachea: No tracheal deviation.   Cardiovascular:      Rate and " Rhythm: Normal rate and regular rhythm.   Pulmonary:      Effort: Pulmonary effort is normal.      Breath sounds: Normal breath sounds.   Abdominal:      General: Bowel sounds are normal.      Palpations: Abdomen is soft.   Musculoskeletal:         General: No tenderness or deformity. Normal range of motion.      Cervical back: Normal range of motion and neck supple.   Lymphadenopathy:      Cervical: No cervical adenopathy.   Skin:     General: Skin is warm and dry.      Capillary Refill: Capillary refill takes less than 2 seconds.   Neurological:      Mental Status: He is alert and oriented to person, place, and time.      Cranial Nerves: No cranial nerve deficit.      Coordination: Coordination normal.      Deep Tendon Reflexes: Reflexes normal.   Psychiatric:         Behavior: Behavior normal.       Gigi Ritchie MD

## 2024-03-04 DIAGNOSIS — M50.223 HERNIATION OF INTERVERTEBRAL DISC AT C6-C7 LEVEL: ICD-10-CM

## 2024-03-04 DIAGNOSIS — G89.21 CHRONIC TRAUMATIC PAIN: ICD-10-CM

## 2024-03-04 DIAGNOSIS — M54.12 CERVICAL RADICULITIS: ICD-10-CM

## 2024-03-04 RX ORDER — ACETAMINOPHEN AND CODEINE PHOSPHATE 300; 30 MG/1; MG/1
1 TABLET ORAL EVERY 4 HOURS PRN
Qty: 30 TABLET | Refills: 0 | Status: SHIPPED | OUTPATIENT
Start: 2024-03-04

## 2024-04-03 DIAGNOSIS — M54.12 CERVICAL RADICULITIS: ICD-10-CM

## 2024-04-03 DIAGNOSIS — G89.21 CHRONIC TRAUMATIC PAIN: ICD-10-CM

## 2024-04-03 DIAGNOSIS — M50.223 HERNIATION OF INTERVERTEBRAL DISC AT C6-C7 LEVEL: ICD-10-CM

## 2024-04-03 RX ORDER — ACETAMINOPHEN AND CODEINE PHOSPHATE 300; 30 MG/1; MG/1
1 TABLET ORAL EVERY 4 HOURS PRN
Qty: 30 TABLET | Refills: 0 | Status: SHIPPED | OUTPATIENT
Start: 2024-04-03

## 2024-05-03 DIAGNOSIS — M54.12 CERVICAL RADICULITIS: ICD-10-CM

## 2024-05-03 DIAGNOSIS — M50.223 HERNIATION OF INTERVERTEBRAL DISC AT C6-C7 LEVEL: ICD-10-CM

## 2024-05-03 DIAGNOSIS — G89.21 CHRONIC TRAUMATIC PAIN: ICD-10-CM

## 2024-05-06 RX ORDER — ACETAMINOPHEN AND CODEINE PHOSPHATE 300; 30 MG/1; MG/1
1 TABLET ORAL EVERY 4 HOURS PRN
Qty: 30 TABLET | Refills: 0 | Status: SHIPPED | OUTPATIENT
Start: 2024-05-06

## 2024-06-05 DIAGNOSIS — G89.21 CHRONIC TRAUMATIC PAIN: ICD-10-CM

## 2024-06-05 DIAGNOSIS — M50.223 HERNIATION OF INTERVERTEBRAL DISC AT C6-C7 LEVEL: ICD-10-CM

## 2024-06-05 DIAGNOSIS — M54.12 CERVICAL RADICULITIS: ICD-10-CM

## 2024-06-05 RX ORDER — ACETAMINOPHEN AND CODEINE PHOSPHATE 300; 30 MG/1; MG/1
1 TABLET ORAL EVERY 4 HOURS PRN
Qty: 30 TABLET | Refills: 0 | Status: SHIPPED | OUTPATIENT
Start: 2024-06-05

## 2024-06-11 ENCOUNTER — OFFICE VISIT (OUTPATIENT)
Dept: URGENT CARE | Facility: CLINIC | Age: 44
End: 2024-06-11
Payer: MEDICARE

## 2024-06-11 ENCOUNTER — APPOINTMENT (OUTPATIENT)
Dept: RADIOLOGY | Facility: CLINIC | Age: 44
End: 2024-06-11
Payer: MEDICARE

## 2024-06-11 VITALS
RESPIRATION RATE: 18 BRPM | SYSTOLIC BLOOD PRESSURE: 149 MMHG | HEART RATE: 77 BPM | DIASTOLIC BLOOD PRESSURE: 95 MMHG | OXYGEN SATURATION: 98 % | TEMPERATURE: 97.3 F

## 2024-06-11 DIAGNOSIS — S69.90XA: ICD-10-CM

## 2024-06-11 DIAGNOSIS — S69.90XA: Primary | ICD-10-CM

## 2024-06-11 PROBLEM — Z77.29 EXPOSURE TO POTENTIALLY HAZARDOUS SUBSTANCE: Status: ACTIVE | Noted: 2024-06-11

## 2024-06-11 PROBLEM — H90.3 SENSORINEURAL HEARING LOSS, BILATERAL: Status: ACTIVE | Noted: 2024-06-11

## 2024-06-11 PROBLEM — S62.109A WRIST FRACTURE, CLOSED: Status: ACTIVE | Noted: 2024-06-11

## 2024-06-11 PROBLEM — Z13.810 ENCOUNTER FOR SCREENING FOR UPPER GASTROINTESTINAL DISORDER: Status: ACTIVE | Noted: 2024-06-11

## 2024-06-11 PROBLEM — S46.219A BICEPS TENDON TEAR: Status: ACTIVE | Noted: 2024-06-11

## 2024-06-11 PROBLEM — Z98.890 HISTORY OF CARPAL TUNNEL REPAIR: Status: ACTIVE | Noted: 2024-06-11

## 2024-06-11 PROBLEM — S92.109A TALUS FRACTURE: Status: ACTIVE | Noted: 2024-06-11

## 2024-06-11 PROCEDURE — G0463 HOSPITAL OUTPT CLINIC VISIT: HCPCS

## 2024-06-11 PROCEDURE — 10120 INC&RMVL FB SUBQ TISS SMPL: CPT

## 2024-06-11 PROCEDURE — 99213 OFFICE O/P EST LOW 20 MIN: CPT

## 2024-06-11 PROCEDURE — 12001 RPR S/N/AX/GEN/TRNK 2.5CM/<: CPT

## 2024-06-11 PROCEDURE — 73130 X-RAY EXAM OF HAND: CPT

## 2024-06-11 RX ORDER — IBUPROFEN 200 MG
600 TABLET ORAL ONCE
Status: COMPLETED | OUTPATIENT
Start: 2024-06-11 | End: 2024-06-11

## 2024-06-11 RX ORDER — DULOXETIN HYDROCHLORIDE 20 MG/1
CAPSULE, DELAYED RELEASE ORAL
COMMUNITY
Start: 2024-01-02 | End: 2024-09-06

## 2024-06-11 RX ADMIN — Medication 600 MG: at 12:43

## 2024-06-11 NOTE — PATIENT INSTRUCTIONS
Keep area covered with band-aid for next 24 hours or while active; leave open to air at night. May apply topical antibiotic ointment (bacitracin) to suture bed) as needed. May take tylenol/ibuprofen every 4-6 hours as needed for pain. Return to the clinic in 7-10 days for suture removal. Report to ER if symptoms worsen or develop worsening pain, redness, or swelling around site of injury.

## 2024-06-11 NOTE — PROGRESS NOTES
[facility]        NAME: Manuel Salas is a 44 y.o. male  : 1980    MRN: [id number]  DATE: 2024  TIME: 12:36 PM    Assessment and Plan   Fish hook in dorsum of hand [S69.90XA]  1. Fish hook in dorsum of hand  ibuprofen (MOTRIN) tablet 600 mg          Patient UTD on Tetanus as of .     Patient Instructions       Follow up with PCP in 3-5 days.  Proceed to  ER if symptoms worsen.    If tests are performed, our office will contact you with results only if changes need to made to the care plan discussed with you at the visit. You can review your full results on [facility].    Chief Complaint     Chief Complaint   Patient presents with    Laceration     Patient with fish hook in right hand that happened about 35 minutes ago. Reports its a tuna fishing hook.         History of Present Illness       Patient presenting with a fish hook to the dorsum of the right hand distal to the right thumb. This occurred about 35 minutes ago. He was reaching into his bag when the hook became imbedded. States it is a tuna fishing hook. He did cut part out but the tip is imbedded into the palm.    Laceration         Review of Systems   Review of Systems   Constitutional: Negative.    Respiratory: Negative.     Cardiovascular: Negative.    Skin:  Positive for wound.        Fish hook right palm         Current Medications       Current Outpatient Medications:     acetaminophen-codeine (TYLENOL/CODEINE #3) 300-30 MG per tablet, Take 1 tablet by mouth every 4 (four) hours as needed for moderate pain, Disp: 30 tablet, Rfl: 0    cetirizine (ZyrTEC) 10 mg tablet, Take 10 mg by mouth daily, Disp: , Rfl:     ibuprofen (MOTRIN) 800 mg tablet, Take 1 tablet (800 mg total) by mouth 3 (three) times a day as needed for mild pain, Disp: 90 tablet, Rfl: 0    omeprazole (PriLOSEC) 20 mg delayed release capsule, Take 20 mg by mouth 2 (two) times a day, Disp: , Rfl:     sildenafil (VIAGRA) 100 mg tablet, Take 1  tablet (100 mg total) by mouth daily as needed for erectile dysfunction, Disp: 20 tablet, Rfl: 0    DULoxetine (CYMBALTA) 20 mg capsule, Take 2 capsules (40 mg total) by mouth daily (Patient not taking: Reported on 6/11/2024), Disp: 60 capsule, Rfl: 0    loratadine (CLARITIN) 10 mg tablet, , Disp: , Rfl:     Current Facility-Administered Medications:     ibuprofen (MOTRIN) tablet 600 mg, 600 mg, Oral, Once,     Current Allergies     Allergies as of 06/11/2024    (No Known Allergies)            The following portions of the patient's history were reviewed and updated as appropriate: allergies, current medications, past family history, past medical history, past social history, past surgical history and problem list.     Past Medical History:   Diagnosis Date    Fractures     Head injury     Hyperlipidemia     Hypertension     PTSD (post-traumatic stress disorder)        Past Surgical History:   Procedure Laterality Date    ANKLE SURGERY      ARM DEBRIDEMENT      COLONOSCOPY      FL MYELOGRAM 2 OR MORE REGIONS  1/26/2023    HIP SURGERY      KNEE SURGERY      SHOULDER SURGERY      WRIST SURGERY         Family History   Adopted: Yes         Medications have been verified.        Objective   /95   Pulse 77   Temp (!) 97.3 °F (36.3 °C)   Resp 18   SpO2 98%        Physical Exam     Physical Exam  Vitals reviewed.   Cardiovascular:      Rate and Rhythm: Normal rate and regular rhythm.      Pulses: Normal pulses.      Heart sounds: Normal heart sounds.   Pulmonary:      Effort: Pulmonary effort is normal. No respiratory distress.      Breath sounds: Normal breath sounds.   Musculoskeletal:         General: Swelling, tenderness and signs of injury present.   Skin:     Comments: Fish hook, distal to right thumb, dorsum aspect  Dried blood present   Neurological:      General: No focal deficit present.      Mental Status: He is alert.      Sensory: No sensory deficit.

## 2024-06-11 NOTE — PROGRESS NOTES
[facility]        NAME: Manuel Salas is a 44 y.o. male  : 1980    MRN: [id number]  DATE: 2024  TIME: 1:31 PM    Assessment and Plan   Fish hook in dorsum of hand [S69.90XA]  1. Fish hook in dorsum of hand  ibuprofen (MOTRIN) tablet 600 mg    XR hand 3+ vw right    CANCELED: XR hand 3+ vw left        Fish hook removed (see procedure documentation) - confirmed with x-ray. 2 sutures placed to incision site following removal. Wound care instructions provided. Last tdap was in .      Patient Instructions     Keep area covered with band-aid for next 24 hours or while active; leave open to air at night. May apply topical antibiotic ointment (bacitracin) to suture bed) as needed. May take tylenol/ibuprofen every 4-6 hours as needed for pain. Return to the clinic in 7-10 days for suture removal. Report to ER if symptoms worsen or develop worsening pain, redness, or swelling around site of injury.     Chief Complaint     Chief Complaint   Patient presents with    Laceration     Patient with fish hook in right hand that happened about 35 minutes ago. Reports its a tuna fishing hook.         History of Present Illness       44 year old male presents for evaluation of fish hook stuck in his right hand that happened 30 minutes PTA. He relates he stuck his hand in a bag containing fishing hooks and got a tuna hook caught onto his hand. He attempted removal by cutting the ends of the hook without success. His last tetanus was in . He reports mild swelling and pain around where the fish hook is imbedded. He denies numbness/tingling to his digits. He is not diabetic. His last tetanus was in .    Foreign Body in Skin  This is a new problem. The current episode started today. The problem occurs constantly. The problem has been unchanged. Pertinent negatives include no abdominal pain, anorexia, arthralgias, change in bowel habit, chest pain, chills, congestion, coughing, fatigue, fever,  headaches, joint swelling, myalgias, nausea, neck pain, numbness, rash, sore throat, swollen glands, urinary symptoms, vertigo, visual change, vomiting or weakness. The symptoms are aggravated by bending. Treatments tried: Attempted removal. The treatment provided no relief.       Review of Systems   Review of Systems   Constitutional:  Negative for activity change, appetite change, chills, fatigue and fever.   HENT:  Negative for congestion and sore throat.    Respiratory:  Negative for cough, chest tightness and shortness of breath.    Cardiovascular:  Negative for chest pain and palpitations.   Gastrointestinal:  Negative for abdominal pain, anorexia, change in bowel habit, constipation, diarrhea, nausea and vomiting.   Musculoskeletal:  Negative for arthralgias, back pain, joint swelling, myalgias and neck pain.   Skin:  Positive for color change and wound. Negative for pallor and rash.   Neurological:  Negative for dizziness, vertigo, weakness, light-headedness, numbness and headaches.         Current Medications       Current Outpatient Medications:     acetaminophen-codeine (TYLENOL/CODEINE #3) 300-30 MG per tablet, Take 1 tablet by mouth every 4 (four) hours as needed for moderate pain, Disp: 30 tablet, Rfl: 0    cetirizine (ZyrTEC) 10 mg tablet, Take 10 mg by mouth daily, Disp: , Rfl:     ibuprofen (MOTRIN) 800 mg tablet, Take 1 tablet (800 mg total) by mouth 3 (three) times a day as needed for mild pain, Disp: 90 tablet, Rfl: 0    omeprazole (PriLOSEC) 20 mg delayed release capsule, Take 20 mg by mouth 2 (two) times a day, Disp: , Rfl:     sildenafil (VIAGRA) 100 mg tablet, Take 1 tablet (100 mg total) by mouth daily as needed for erectile dysfunction, Disp: 20 tablet, Rfl: 0    DULoxetine (CYMBALTA) 20 mg capsule, Take 2 capsules (40 mg total) by mouth daily (Patient not taking: Reported on 6/11/2024), Disp: 60 capsule, Rfl: 0    loratadine (CLARITIN) 10 mg tablet, , Disp: , Rfl:   No current  facility-administered medications for this visit.    Current Allergies     Allergies as of 06/11/2024    (No Known Allergies)            The following portions of the patient's history were reviewed and updated as appropriate: allergies, current medications, past family history, past medical history, past social history, past surgical history and problem list.     Past Medical History:   Diagnosis Date    Fractures     Head injury     Hyperlipidemia     Hypertension     PTSD (post-traumatic stress disorder)        Past Surgical History:   Procedure Laterality Date    ANKLE SURGERY      ARM DEBRIDEMENT      COLONOSCOPY      FL MYELOGRAM 2 OR MORE REGIONS  1/26/2023    HIP SURGERY      KNEE SURGERY      SHOULDER SURGERY      WRIST SURGERY         Family History   Adopted: Yes         Medications have been verified.        Objective   /95   Pulse 77   Temp (!) 97.3 °F (36.3 °C)   Resp 18   SpO2 98%        Physical Exam     Physical Exam  Vitals and nursing note reviewed.   Constitutional:       General: He is awake. He is not in acute distress.     Appearance: Normal appearance. He is well-developed and normal weight.   HENT:      Head: Normocephalic and atraumatic.   Cardiovascular:      Rate and Rhythm: Normal rate and regular rhythm.      Pulses: Normal pulses.   Pulmonary:      Effort: Pulmonary effort is normal.   Musculoskeletal:      Cervical back: Normal range of motion.   Skin:     General: Skin is warm and dry.      Findings: Wound present.      Comments: Imbedded fish hook in palmar surface of right hand    Neurological:      General: No focal deficit present.      Mental Status: He is alert and oriented to person, place, and time.   Psychiatric:         Mood and Affect: Mood normal.         Behavior: Behavior normal. Behavior is cooperative.         Thought Content: Thought content normal.         Judgment: Judgment normal.       Universal Protocol:  Consent: Verbal consent obtained.  Risks and  benefits: risks, benefits and alternatives were discussed  Consent given by: patient  Patient understanding: patient states understanding of the procedure being performed  Patient consent: the patient's understanding of the procedure matches consent given  Required items: required blood products, implants, devices, and special equipment available  Patient identity confirmed: verbally with patient  Foreign body removal    Date/Time: 6/11/2024 12:00 PM    Performed by: BRANDON Marinelli  Authorized by: BRANDON Marinelli  Body area: skin  General location: upper extremity  Location details: right hand  Anesthesia: local infiltration    Anesthesia:  Local Anesthetic: lidocaine 1% without epinephrine  Anesthetic total: 2 mL  Patient restrained: no  Patient cooperative: yes  Localization method: serial x-rays and visualized  Removal mechanism: irrigation and scalpel  Dressing: dressing applied and antibiotic ointment  Tendon involvement: none  Depth: subcutaneous  Complexity: simple  1 objects recovered.  Objects recovered: Fish hook  Post-procedure assessment: foreign body removed  Patient tolerance: patient tolerated the procedure well with no immediate complications  Comments: Incision made with 11 blade scalpel. Fish hook removed - confirmed with x-ray.     Universal Protocol:  Consent: Verbal consent obtained.  Risks and benefits: risks, benefits and alternatives were discussed  Consent given by: patient  Patient understanding: patient states understanding of the procedure being performed  Patient consent: the patient's understanding of the procedure matches consent given  Required items: required blood products, implants, devices, and special equipment available  Patient identity confirmed: verbally with patient  Laceration repair    Date/Time: 6/11/2024 12:00 PM    Performed by: BRANDON Marinelli  Authorized by: BRANDON Marinelli  Body area: upper extremity  Location details: right  hand  Laceration length: 1 cm  Foreign bodies: no foreign bodies  Tendon involvement: none  Nerve involvement: none  Vascular damage: no  Anesthesia: local infiltration    Anesthesia:  Local Anesthetic: lidocaine 1% without epinephrine  Anesthetic total: 2 mL    Sedation:  Patient sedated: no      Wound Dehiscence:  Superficial Wound Dehiscence: simple closure      Procedure Details:  Preparation: Patient was prepped and draped in the usual sterile fashion.  Irrigation solution: saline  Irrigation method: syringe  Amount of cleaning: standard  Debridement: none  Degree of undermining: none  Skin closure: 4-0 nylon  Number of sutures: 2  Technique: simple  Approximation: close  Approximation difficulty: simple  Dressing: antibiotic ointment (Band-aid)  Patient tolerance: patient tolerated the procedure well with no immediate complications  Cleaning details: other particulate matter

## 2024-07-05 DIAGNOSIS — M54.12 CERVICAL RADICULITIS: ICD-10-CM

## 2024-07-05 DIAGNOSIS — M50.223 HERNIATION OF INTERVERTEBRAL DISC AT C6-C7 LEVEL: ICD-10-CM

## 2024-07-05 DIAGNOSIS — G89.21 CHRONIC TRAUMATIC PAIN: ICD-10-CM

## 2024-07-05 RX ORDER — ACETAMINOPHEN AND CODEINE PHOSPHATE 300; 30 MG/1; MG/1
1 TABLET ORAL EVERY 4 HOURS PRN
Qty: 30 TABLET | Refills: 0 | Status: SHIPPED | OUTPATIENT
Start: 2024-07-05

## 2024-08-08 DIAGNOSIS — G89.21 CHRONIC TRAUMATIC PAIN: ICD-10-CM

## 2024-08-08 DIAGNOSIS — M54.12 CERVICAL RADICULITIS: ICD-10-CM

## 2024-08-08 DIAGNOSIS — M50.223 HERNIATION OF INTERVERTEBRAL DISC AT C6-C7 LEVEL: ICD-10-CM

## 2024-08-09 RX ORDER — ACETAMINOPHEN AND CODEINE PHOSPHATE 300; 30 MG/1; MG/1
1 TABLET ORAL EVERY 4 HOURS PRN
Qty: 30 TABLET | Refills: 0 | Status: SHIPPED | OUTPATIENT
Start: 2024-08-09

## 2024-08-15 ENCOUNTER — RA CDI HCC (OUTPATIENT)
Dept: OTHER | Facility: HOSPITAL | Age: 44
End: 2024-08-15

## 2024-08-22 ENCOUNTER — OFFICE VISIT (OUTPATIENT)
Dept: FAMILY MEDICINE CLINIC | Facility: CLINIC | Age: 44
End: 2024-08-22
Payer: MEDICARE

## 2024-08-22 VITALS
OXYGEN SATURATION: 98 % | BODY MASS INDEX: 34.3 KG/M2 | TEMPERATURE: 97.3 F | HEIGHT: 70 IN | HEART RATE: 70 BPM | WEIGHT: 239.6 LBS | SYSTOLIC BLOOD PRESSURE: 142 MMHG | DIASTOLIC BLOOD PRESSURE: 84 MMHG

## 2024-08-22 DIAGNOSIS — R63.4 WEIGHT LOSS: ICD-10-CM

## 2024-08-22 DIAGNOSIS — G89.4 CHRONIC PAIN SYNDROME: ICD-10-CM

## 2024-08-22 DIAGNOSIS — F43.10 PTSD (POST-TRAUMATIC STRESS DISORDER): ICD-10-CM

## 2024-08-22 DIAGNOSIS — I10 ESSENTIAL (PRIMARY) HYPERTENSION: Primary | ICD-10-CM

## 2024-08-22 DIAGNOSIS — R70.0 ESR RAISED: ICD-10-CM

## 2024-08-22 DIAGNOSIS — M50.223 HERNIATION OF INTERVERTEBRAL DISC AT C6-C7 LEVEL: ICD-10-CM

## 2024-08-22 PROCEDURE — 99214 OFFICE O/P EST MOD 30 MIN: CPT | Performed by: STUDENT IN AN ORGANIZED HEALTH CARE EDUCATION/TRAINING PROGRAM

## 2024-08-22 PROCEDURE — G2211 COMPLEX E/M VISIT ADD ON: HCPCS | Performed by: STUDENT IN AN ORGANIZED HEALTH CARE EDUCATION/TRAINING PROGRAM

## 2024-08-22 RX ORDER — LOSARTAN POTASSIUM 25 MG/1
25 TABLET ORAL DAILY
COMMUNITY

## 2024-08-22 NOTE — PROGRESS NOTES
Ambulatory Visit  Name: Manuel Salas      : 1980      MRN: [id number]  Encounter Provider: Gigi Ritchie MD  Encounter Date: 2024   Encounter department: [facility]    Assessment & Plan   1. Essential (primary) hypertension  Assessment & Plan:  [facility]  /84  Continue current dose of Losartan  Will check CBC, CMP, TSH, A1C, and Microalbumin.  Orders:  -     Lipid panel; Future  -     CBC and differential; Future  -     Comprehensive metabolic panel; Future  2. Herniation of intervertebral disc at C6-C7 level  Assessment & Plan:  Updated controlled substance agreement  Urine tox screen collected.  Will continue with Cymbalta and Tylenol 3.  3. Chronic pain syndrome  -     MM DT_Alprazolam Definitive Test  -     MM DT_Amphetamine Definitive Test  -     MM DT_Aripiprazole Definitive Test  -     MM DT_Bath Salts Definitive Test  -     MM DT_Buprenorphine Definitive Test  -     MM DT_Bupropion Definitive Test  -     MM DT_Butalbital Definitive Test  -     MM DT_Carisoprodol Definitive Test  -     MM DT_Clonazepam Definitive Test  -     MM DT_Clozapine Definitive Test  -     MM DT_Cocaine Definitive Test  -     MM DT_Codeine Definitive Test  -     MM DT_Dextromethorphan Definitive Test  -     MM Diazepam Definitive Test  -     MM DT_Ethyl Glucuronide/Ethyl Sulfate Definitive Test  -     MM DT_Fentanyl Definitive Test  -     MM DT_Haloperidol Definitive Test  -     MM DT_Heroin Definitive Test  -     MM DT_Hydrocodone Definitive Test  -     MM DT_Hydromorphone Definitive Test  -     MM DT_Kratom Definitive Test  -     MM Lorazepam Definitive Test  -     MM DT_MDMA Definitive Test  -     MM DT_Methadone Definitive Test  -     MM DT_Methamphetamine Definitive Test  -     MM DT_Morphine Definitive Test  -     MM DT_Oxazepam Definitive Test  -     MM DT_Oxycodone Definitive Test  -     MM DT_Oxymorphone Definitive Test  -     MM DT_Pentobarbital  Definitive Test  -     MM DT_Phencyclidine Definitive Test  -     MM DT_Phenobarbital Definitive Test  -     MM DT_Phentermine Definitive Test  -     MM DT_Spice Definitive Test  -     MM DT_Tapentadol Definitive Test  -     MM DT_Temazapam Definitive Test  -     MM DT_THC Definitive Test  -     MM DT_Tramadol Definitive Test  -     MM DT_Xylazine Definitive Test  4. PTSD (post-traumatic stress disorder)  Assessment & Plan:  Continue with Cymbalta.  5. Weight loss  Assessment & Plan:  Differential includes hypo/hyperthyroidism vs malignancy vs autoimmune disease vs anemia  Will check CBC, CMP, ESR, CRP, TSH  Orders:  -     TSH, 3rd generation with Free T4 reflex; Future  -     C-reactive protein; Future  -     Sedimentation rate, automated; Future  6. ESR raised       History of Present Illness     44 year old male with PMH of chronic pain syndrome, ADHD, PTSD, cervical disc herniation, GERD, and HTN presents to the office for a 6 month follow up visit. Reports that he lost about 25 lbs over the past 2.5 weeks. Has also been more fatigued than usual. Reports that he was falling asleep at the table while he was losing the weight. Reports that he weighed 240 lbs today. Reports that his stool was dark black and was thinner than usual. Also reports increased sensitivity to temperature changes and anxiety attacks. Also reports localized tenderness over the left side of his back. Patient reports that his pain is relieved with Tylenol 3 and Cymbalta. Reports that he has only been taking 1 Tylenol 3 per day. Also reports that his PTSD and ADHD is well controlled with Cymbalta. Patient previously served in the  and sustained multiple injuries. Patient reports that he is adopted and does not know about his family history. Reports he had a colonoscopy 2-3 years ago and was benign. [facility] and the findings were benign.         Review of Systems   Constitutional:  Positive for diaphoresis,  "fatigue and unexpected weight change. Negative for chills and fever.   HENT:  Negative for congestion, ear pain, rhinorrhea, sinus pressure and sore throat.    Eyes:  Negative for pain and visual disturbance.   Respiratory:  Negative for cough, chest tightness, shortness of breath and wheezing.    Cardiovascular:  Negative for chest pain and palpitations.   Gastrointestinal:  Negative for abdominal distention, abdominal pain, anal bleeding, blood in stool, constipation, diarrhea, nausea and vomiting.   Endocrine: Positive for cold intolerance and heat intolerance.   Genitourinary:  Negative for dysuria and hematuria.   Musculoskeletal:  Positive for back pain and neck pain. Negative for arthralgias.   Skin:  Negative for color change and rash.   Neurological:  Negative for dizziness, tremors, seizures, syncope, weakness and headaches.   Psychiatric/Behavioral:  The patient is nervous/anxious.    All other systems reviewed and are negative.      Objective     /84 (BP Location: Left arm, Patient Position: Sitting, Cuff Size: Large)   Pulse 70   Temp (!) 97.3 °F (36.3 °C) (Tympanic)   Ht 5' 9.5\" (1.765 m)   Wt 109 kg (239 lb 9.6 oz)   SpO2 98%   BMI 34.88 kg/m²     Physical Exam  Vitals and nursing note reviewed.   Constitutional:       General: He is not in acute distress.     Appearance: Normal appearance. He is well-developed. He is diaphoretic. He is not ill-appearing.   HENT:      Head: Normocephalic and atraumatic.      Mouth/Throat:      Mouth: Mucous membranes are moist.      Pharynx: No oropharyngeal exudate or posterior oropharyngeal erythema.   Eyes:      Conjunctiva/sclera: Conjunctivae normal.      Pupils: Pupils are equal, round, and reactive to light.   Cardiovascular:      Rate and Rhythm: Normal rate and regular rhythm.      Pulses: Normal pulses.      Heart sounds: No murmur heard.  Pulmonary:      Effort: Pulmonary effort is normal. No respiratory distress.      Breath sounds: Normal " breath sounds.   Abdominal:      General: Bowel sounds are normal. There is no distension.      Palpations: Abdomen is soft.      Tenderness: There is no abdominal tenderness.   Musculoskeletal:         General: No swelling.      Cervical back: Neck supple.   Lymphadenopathy:      Cervical: No cervical adenopathy.   Skin:     General: Skin is warm.      Capillary Refill: Capillary refill takes less than 2 seconds.      Coloration: Skin is not pale.      Findings: No erythema or rash.   Neurological:      General: No focal deficit present.      Mental Status: He is alert.   Psychiatric:         Mood and Affect: Mood normal.       Administrative Statements

## 2024-08-22 NOTE — ASSESSMENT & PLAN NOTE
Differential includes hypo/hyperthyroidism vs malignancy vs autoimmune disease vs anemia  Will check CBC, CMP, ESR, CRP, TSH

## 2024-08-22 NOTE — ASSESSMENT & PLAN NOTE
[facility].  /84  Continue current dose of Losartan  Will check CBC, CMP, TSH, A1C, and Microalbumin.

## 2024-08-22 NOTE — ASSESSMENT & PLAN NOTE
Updated controlled substance agreement  Urine tox screen collected.  Will continue with Cymbalta and Tylenol 3.

## 2024-08-31 LAB
4OH-XYLAZINE UR QL CFM: NEGATIVE NG/ML
6MAM UR QL CFM: NEGATIVE NG/ML
7AMINOCLONAZEPAM UR QL CFM: NEGATIVE NG/ML
A-OH ALPRAZ UR QL CFM: NEGATIVE NG/ML
AMPHET UR QL CFM: NEGATIVE NG/ML
AMPHET UR QL CFM: NEGATIVE NG/ML
BUPRENORPHINE UR QL CFM: NEGATIVE NG/ML
BUTALBITAL UR QL CFM: NEGATIVE NG/ML
BZE UR QL CFM: NEGATIVE NG/ML
CARISOPRODOL UR QL CFM: NEGATIVE NG/ML
CODEINE UR QL CFM: ABNORMAL NG/ML
EDDP UR QL CFM: NEGATIVE NG/ML
ETHYL GLUCURONIDE UR QL CFM: NEGATIVE NG/ML
ETHYL SULFATE UR QL SCN: NEGATIVE NG/ML
EUTYLONE UR QL: NEGATIVE NG/ML
FENTANYL UR QL CFM: NEGATIVE NG/ML
GLIADIN IGG SER IA-ACNC: NEGATIVE NG/ML
GLUCOSE 30M P 50 G LAC PO SERPL-MCNC: NEGATIVE NG/ML
HYDROCODONE UR QL CFM: NEGATIVE NG/ML
HYDROCODONE UR QL CFM: NEGATIVE NG/ML
HYDROMORPHONE UR QL CFM: NEGATIVE NG/ML
LORAZEPAM UR QL CFM: NEGATIVE NG/ML
MDMA UR QL CFM: NEGATIVE NG/ML
MEPROBAMATE UR QL CFM: NEGATIVE NG/ML
METHADONE UR QL CFM: NEGATIVE NG/ML
METHAMPHET UR QL CFM: NEGATIVE NG/ML
MORPHINE UR QL CFM: ABNORMAL NG/ML
MORPHINE UR QL CFM: ABNORMAL NG/ML
NORBUPRENORPHINE UR QL CFM: NEGATIVE NG/ML
NORDIAZEPAM UR QL CFM: NEGATIVE NG/ML
NORFENTANYL UR QL CFM: NEGATIVE NG/ML
NORHYDROCODONE UR QL CFM: NEGATIVE NG/ML
NORHYDROCODONE UR QL CFM: NEGATIVE NG/ML
NOROXYCODONE UR QL CFM: NEGATIVE NG/ML
OPC-3373 QUANTIFICATION: NEGATIVE NG/ML
OXAZEPAM UR QL CFM: NEGATIVE NG/ML
OXYCODONE UR QL CFM: NEGATIVE NG/ML
OXYMORPHONE UR QL CFM: NEGATIVE NG/ML
OXYMORPHONE UR QL CFM: NEGATIVE NG/ML
PARA-FLUOROFENTANYL QUANTIFICATION: NORMAL NG/ML
PCP UR QL CFM: NEGATIVE NG/ML
PENTOBARB UR QL CFM: NEGATIVE NG/ML
PHENOBARB UR QL CFM: NEGATIVE NG/ML
SL AMB 5F-ADB-M7 METABOLITE QUANTIFICATION: NEGATIVE NG/ML
SL AMB 7-OH-MITRAGYNINE (KRATOM ALKALOID) QUANTIFICATION: NEGATIVE NG/ML
SL AMB AB-FUBINACA-M3 METABOLITE QUANTIFICATION: NEGATIVE NG/ML
SL AMB ACETYL FENTANYL QUANTIFICATION: NORMAL NG/ML
SL AMB ACETYL NORFENTANYL QUANTIFICATION: NORMAL NG/ML
SL AMB ACRYL FENTANYL QUANTIFICATION: NORMAL NG/ML
SL AMB CARFENTANIL QUANTIFICATION: NORMAL NG/ML
SL AMB CLOZAPINE QUANTIFICATION: NEGATIVE NG/ML
SL AMB CTHC (MARIJUANA METABOLITE) QUANTIFICATION: ABNORMAL NG/ML
SL AMB DEXTROMETHORPHAN QUANTIFICATION: NEGATIVE NG/ML
SL AMB DEXTRORPHAN (DEXTROMETHORPHAN METABOLITE) QUANT: NEGATIVE NG/ML
SL AMB HALOPERIDOL  QUANTIFICATION: NEGATIVE NG/ML
SL AMB HALOPERIDOL METABOLITE QUANTIFICATION: NEGATIVE NG/ML
SL AMB HYDROXYBUPROPION QUANTIFICATION: NEGATIVE NG/ML
SL AMB JWH018 METABOLITE QUANTIFICATION: NEGATIVE NG/ML
SL AMB JWH073 METABOLITE QUANTIFICATION: NEGATIVE NG/ML
SL AMB MDMB-FUBINACA-M1 METABOLITE QUANTIFICATION: NEGATIVE NG/ML
SL AMB METHYLONE QUANTIFICATION: NEGATIVE NG/ML
SL AMB N-DESMETHYL-TRAMADOL QUANTIFICATION: NEGATIVE NG/ML
SL AMB N-DESMETHYLCLOZAPINE QUANTIFICATION: NEGATIVE NG/ML
SL AMB PHENTERMINE QUANTIFICATION: NEGATIVE NG/ML
SL AMB RCS4 METABOLITE QUANTIFICATION: NEGATIVE NG/ML
SPECIMEN DRAWN SERPL: NEGATIVE NG/ML
TAPENTADOL UR QL CFM: NEGATIVE NG/ML
TEMAZEPAM UR QL CFM: NEGATIVE NG/ML
TEMAZEPAM UR QL CFM: NEGATIVE NG/ML
TRAMADOL UR QL CFM: NEGATIVE NG/ML
URATE/CREAT 24H UR: NEGATIVE NG/ML
XYLAZINE UR QL CFM: NEGATIVE NG/ML

## 2024-09-05 DIAGNOSIS — G89.21 CHRONIC TRAUMATIC PAIN: ICD-10-CM

## 2024-09-05 DIAGNOSIS — M50.223 HERNIATION OF INTERVERTEBRAL DISC AT C6-C7 LEVEL: ICD-10-CM

## 2024-09-05 DIAGNOSIS — M54.12 CERVICAL RADICULITIS: ICD-10-CM

## 2024-09-06 RX ORDER — ACETAMINOPHEN AND CODEINE PHOSPHATE 300; 30 MG/1; MG/1
1 TABLET ORAL EVERY 4 HOURS PRN
Qty: 30 TABLET | Refills: 0 | Status: SHIPPED | OUTPATIENT
Start: 2024-09-06

## 2024-09-09 ENCOUNTER — CONSULT (OUTPATIENT)
Dept: GASTROENTEROLOGY | Facility: CLINIC | Age: 44
End: 2024-09-09
Payer: MEDICARE

## 2024-09-09 VITALS
OXYGEN SATURATION: 99 % | SYSTOLIC BLOOD PRESSURE: 138 MMHG | HEART RATE: 69 BPM | WEIGHT: 233 LBS | HEIGHT: 69 IN | TEMPERATURE: 97.5 F | BODY MASS INDEX: 34.51 KG/M2 | DIASTOLIC BLOOD PRESSURE: 70 MMHG

## 2024-09-09 DIAGNOSIS — R68.81 EARLY SATIETY: ICD-10-CM

## 2024-09-09 DIAGNOSIS — R63.4 UNINTENTIONAL WEIGHT LOSS: Primary | ICD-10-CM

## 2024-09-09 DIAGNOSIS — K21.9 GASTROESOPHAGEAL REFLUX DISEASE WITHOUT ESOPHAGITIS: ICD-10-CM

## 2024-09-09 PROCEDURE — 99204 OFFICE O/P NEW MOD 45 MIN: CPT | Performed by: INTERNAL MEDICINE

## 2024-09-09 NOTE — PROGRESS NOTES
[facility] - Outpatient Note  Manuel Salas 44 y.o. male MRN: [id number]  Encounter: [id number]      ASSESSMENT AND PLAN:    Manuel Salas is a 44 y.o. old pleasant male with PMH of PTSD, marijuana use, chronic narcotics, TBI, musculoskeletal issues who presents for unintentional weight loss, abdominal comfort and chest discomfort      Unintentional weight loss  Epigastric/chest discomfort-unclear etiology differential includes gastroparesis, malignancy (less likely), functional disorder, esophageal spasm, medication side effect versus nerve issues from previous TBI/work in the  among other causes.  Plan for EGD and colonoscopy  Check CT chest abdomen pelvis  Check GES  Will consider 24hr ph/manomtry testing  Consider Elavil  Continue Gas-X      There are no diagnoses linked to this encounter.  ______________________________________________________________________    SUBJECTIVE:      Lost 30lbs in 2 years    2 years ago, slow progression, gas was causing bad panic attacks      Constant epigastric rumbling/discomfort, poor appetiie, hot and cold sweats, somes day fine and some times not fine, a/w lethargy, gas fills up chest and spreads    Once very couple months initiatlly but now more frequently, now happening symptoms last for three days    A/w sweaty, dizziness, hot and cold sweats, breathingheavy,     States had full wokrup with cardioligst among other specialist with no source of chest fluttering/discomfort    One BM per day, formed     GERD controlled    Colonoscopy and EGD 3 years ago    EGD with possible Gandhi's esophagus but negative biopsies.  Repeat 1 year.  [facility].      Tries Gas-x    Omeprazole 20mg 1-2 x per day        I reviewed prior external notes    I reviewed previous lab results and images      REVIEW OF SYSTEMS:     REVIEW OF ALL OTHER SYSTEMS IS OTHERWISE NEGATIVE.      Historical Information   Past Medical  "History:   Diagnosis Date    Anxiety 2004    Arthritis 2006    Depression 2004    Fractures     GERD (gastroesophageal reflux disease)     Head injury     Headache(784.0) 2004    HL (hearing loss)     Hyperlipidemia     Hypertension     PTSD (post-traumatic stress disorder)      Past Surgical History:   Procedure Laterality Date    ANKLE SURGERY      ARM DEBRIDEMENT      COLONOSCOPY      FL MYELOGRAM 2 OR MORE REGIONS  2023    FRACTURE SURGERY  2004    HIP SURGERY      KNEE SURGERY      SHOULDER SURGERY      WRIST SURGERY       Social History   Social History     Substance and Sexual Activity   Alcohol Use Not Currently     Social History     Substance and Sexual Activity   Drug Use No     Social History     Tobacco Use   Smoking Status Former    Current packs/day: 0.00    Average packs/day: 0.7 packs/day for 7.0 years (5.0 ttl pk-yrs)    Types: Cigarettes    Start date: 2002    Quit date:     Years since quittin.7   Smokeless Tobacco Former    Types: Chew     Family History   Adopted: Yes       Meds/Allergies       Current Outpatient Medications:     acetaminophen-codeine (TYLENOL/CODEINE #3) 300-30 MG per tablet    cetirizine (ZyrTEC) 10 mg tablet    DULoxetine (CYMBALTA) 20 mg capsule    ibuprofen (MOTRIN) 800 mg tablet    losartan (COZAAR) 25 mg tablet    omeprazole (PriLOSEC) 20 mg delayed release capsule    sildenafil (VIAGRA) 100 mg tablet    No Known Allergies        Objective     Blood pressure 138/70, pulse 69, temperature 97.5 °F (36.4 °C), temperature source Temporal, height 5' 9\" (1.753 m), weight 106 kg (233 lb), SpO2 99%. Body mass index is 34.41 kg/m².      PHYSICAL EXAM:      General Appearance:   Alert, cooperative, no distress   HEENT:   Normocephalic, atraumatic, anicteric.     Neck:  Supple, symmetrical, trachea midline   Lungs:   Clear to auscultation bilaterally; no rales, rhonchi or wheezing; respirations unlabored    Heart::   Regular rate and rhythm; no murmur, " rub, or gallop.   Abdomen:   Soft, non-tender, non-distended; normal bowel sounds; no masses, no organomegaly    Genitalia:   Deferred    Rectal:   Deferred    Extremities:  No cyanosis, clubbing or edema    Pulses:  2+ and symmetric    Skin:  No jaundice, rashes, or lesions    Lymph nodes:  No palpable cervical lymphadenopathy        Lab Results:   No visits with results within 1 Day(s) from this visit.   Latest known visit with results is:   Office Visit on 08/22/2024   Component Date Value    Alpha-Hydroxyalprazolam * 08/22/2024 negative     Amphetamine Quantificati* 08/22/2024 negative     Aripiprazole Quantificat* 08/22/2024 negative     OPC-3373 QUANTIFICATION 08/22/2024 negative     EUTYLONE QUANTIFICATION 08/22/2024 negative     METHYLONE QUANTIFICATION 08/22/2024 negative     Buprenorphine Quantifica* 08/22/2024 negative     Norbuprenorphine Quantif* 08/22/2024 negative     Hydroxybupropion Quantif* 08/22/2024 negative     Butalbital Quantification 08/22/2024 negative     Carisoprodol Quantificat* 08/22/2024 negative     Meprobamate Quantificati* 08/22/2024 negative     7-Amino-Clonazepam Quant* 08/22/2024 negative     Clozapine Quantification 08/22/2024 negative     N-desmethylclozapine Fortino* 08/22/2024 negative     Cocaine metabolite Quant* 08/22/2024 negative     Codeine Quantification 08/22/2024 positive-> 96723-P (A)     Morphine Quantification 08/22/2024 positive-4875.777-I (A)     Hydrocodone Quantificati* 08/22/2024 negative     Norhydrocodone Quantific* 08/22/2024 negative     Hydromorphone Quantifica* 08/22/2024 negative     Dextromethorphan Quantif* 08/22/2024 negative     Dextrorphan (Dextrometho* 08/22/2024 negative     Nordiazepam Quantificati* 08/22/2024 negative     Temazepam Quantification 08/22/2024 negative     Oxazepam Quantification 08/22/2024 negative     Ethyl Glucuronide Quanti* 08/22/2024 negative     Ethyl Sulfate Quantifica* 08/22/2024 negative     Fentanyl Quantification  08/22/2024 negative     Norfentanyl Quantificati* 08/22/2024 negative     Acetyl fentanyl Quantifi* 08/22/2024 Fen Neg     Acetyl norfentanyl Quant* 08/22/2024 Fen Neg     Acryl fentanyl Quantific* 08/22/2024 Fen Neg     Carfentanil Quantificati* 08/22/2024 Fen Neg     Para-fluorofentanyl Rudy* 08/22/2024 Fen Neg     Haloperidol  Quantificat* 08/22/2024 negative     Haloperidol metabolite Q* 08/22/2024 negative     6-MAYRA (Heroin metabolite* 08/22/2024 negative     Hydrocodone Quantificati* 08/22/2024 negative     Norhydrocodone Quantific* 08/22/2024 negative     Hydromorphone Quantifica* 08/22/2024 negative     Hydromorphone Quantifica* 08/22/2024 negative     Mitragynine (Kratom yulisa* 08/22/2024 negative     5-KC-Nueestvngxl (Kratom* 08/22/2024 negative     Lorazepam Quantification 08/22/2024 negative     MDMA Quantification 08/22/2024 negative     Methadone Quantification 08/22/2024 negative     EDDP (Methadone metaboli* 08/22/2024 negative     Amphetamine Quantificati* 08/22/2024 negative     Methamphetamine Quantifi* 08/22/2024 negative     Morphine Quantification 08/22/2024 positive-4875.777-I (A)     Hydromorphone Quantifica* 08/22/2024 negative     Oxazepam Quantification 08/22/2024 negative     Oxycodone Quantification 08/22/2024 negative     Noroxycodone Quantificat* 08/22/2024 negative     Oxymorphone Quantificati* 08/22/2024 negative     Oxymorphone Quantificati* 08/22/2024 negative     PENTOBARBITAL QUANTIFICA* 08/22/2024 negative     Phencyclidine Quantifica* 08/22/2024 negative     Phenobarbital Quantifica* 08/22/2024 negative     PHENTERMINE QUANTIFICATI* 08/22/2024 negative     5F-ADB-M7 08/22/2024 negative     MP-QRCLFOEE-Q7 METABOLIT* 08/22/2024 negative     HRNT-JEGPLSUG-E3 METABOL* 08/22/2024 negative     VRK893 metabolite Quanti* 08/22/2024 negative     IYX246 metabolite Quanti* 08/22/2024 negative     RCS4 METABOLITE QUANTIFI* 08/22/2024 negative     XLR11/ METABOLITE Q* 08/22/2024  negative     Tapentadol Quantification 08/22/2024 negative     Temazepam Quantification 08/22/2024 negative     Oxazepam Quantification 08/22/2024 negative     cTHC (Marijuana metaboli* 08/22/2024 positive-768.668-I (A)     Tramadol Quantification 08/22/2024 negative     O-desmethyl-tramadol Fortino* 08/22/2024 negative     N-DESMETHYL-TRAMADOL FORTINO* 08/22/2024 negative     XYLAZINE 08/22/2024 negative     4-HYDROXY XYLAZINE 08/22/2024 negative          Radiology Results:   No results found.    Answers submitted by the patient for this visit:  Abdominal Pain Questionnaire (Submitted on 9/9/2024)  Chief Complaint: Abdominal pain  Frequency: every several days  Episode duration: 4 Days  Progression since onset: gradually worsening  Pain location: LUQ, epigastric region  Pain - numeric: 2/10  Pain quality: dull  Radiates to: epigastric region, left shoulder, right shoulder, chest  anorexia: Yes  arthralgias: Yes  belching: Yes  constipation: No  diarrhea: No  dysuria: No  fever: No  flatus: Yes  frequency: No  headaches: Yes  hematochezia: No  hematuria: No  melena: No  myalgias: Yes  nausea: No  weight loss: Yes  vomiting: No  Aggravated by: being still, bowel movement, certain positions  Relieved by: certain positions, sitting up, standing

## 2024-09-09 NOTE — H&P (VIEW-ONLY)
[facility] - Outpatient Note  Manuel Salas 44 y.o. male MRN: [id number]  Encounter: [id number]      ASSESSMENT AND PLAN:    Manuel Salas is a 44 y.o. old pleasant male with PMH of PTSD, marijuana use, chronic narcotics, TBI, musculoskeletal issues who presents for unintentional weight loss, abdominal comfort and chest discomfort      Unintentional weight loss  Epigastric/chest discomfort-unclear etiology differential includes gastroparesis, malignancy (less likely), functional disorder, esophageal spasm, medication side effect versus nerve issues from previous TBI/work in the  among other causes.  Plan for EGD and colonoscopy  Check CT chest abdomen pelvis  Check GES  Will consider 24hr ph/manomtry testing  Consider Elavil  Continue Gas-X      There are no diagnoses linked to this encounter.  ______________________________________________________________________    SUBJECTIVE:      Lost 30lbs in 2 years    2 years ago, slow progression, gas was causing bad panic attacks      Constant epigastric rumbling/discomfort, poor appetiie, hot and cold sweats, somes day fine and some times not fine, a/w lethargy, gas fills up chest and spreads    Once very couple months initiatlly but now more frequently, now happening symptoms last for three days    A/w sweaty, dizziness, hot and cold sweats, breathingheavy,     States had full wokrup with cardioligst among other specialist with no source of chest fluttering/discomfort    One BM per day, formed     GERD controlled    Colonoscopy and EGD 3 years ago    EGD with possible Gandhi's esophagus but negative biopsies.  Repeat 1 year.  [facility] with [facility].      Tries Gas-x    Omeprazole 20mg 1-2 x per day        I reviewed prior external notes    I reviewed previous lab results and images      REVIEW OF SYSTEMS:     REVIEW OF ALL OTHER SYSTEMS IS OTHERWISE NEGATIVE.      Historical Information   Past Medical  "History:   Diagnosis Date    Anxiety 2004    Arthritis 2006    Depression 2004    Fractures     GERD (gastroesophageal reflux disease)     Head injury     Headache(784.0) 2004    HL (hearing loss)     Hyperlipidemia     Hypertension     PTSD (post-traumatic stress disorder)      Past Surgical History:   Procedure Laterality Date    ANKLE SURGERY      ARM DEBRIDEMENT      COLONOSCOPY      FL MYELOGRAM 2 OR MORE REGIONS  2023    FRACTURE SURGERY  2004    HIP SURGERY      KNEE SURGERY      SHOULDER SURGERY      WRIST SURGERY       Social History   Social History     Substance and Sexual Activity   Alcohol Use Not Currently     Social History     Substance and Sexual Activity   Drug Use No     Social History     Tobacco Use   Smoking Status Former    Current packs/day: 0.00    Average packs/day: 0.7 packs/day for 7.0 years (5.0 ttl pk-yrs)    Types: Cigarettes    Start date: 2002    Quit date:     Years since quittin.7   Smokeless Tobacco Former    Types: Chew     Family History   Adopted: Yes       Meds/Allergies       Current Outpatient Medications:     acetaminophen-codeine (TYLENOL/CODEINE #3) 300-30 MG per tablet    cetirizine (ZyrTEC) 10 mg tablet    DULoxetine (CYMBALTA) 20 mg capsule    ibuprofen (MOTRIN) 800 mg tablet    losartan (COZAAR) 25 mg tablet    omeprazole (PriLOSEC) 20 mg delayed release capsule    sildenafil (VIAGRA) 100 mg tablet    No Known Allergies        Objective     Blood pressure 138/70, pulse 69, temperature 97.5 °F (36.4 °C), temperature source Temporal, height 5' 9\" (1.753 m), weight 106 kg (233 lb), SpO2 99%. Body mass index is 34.41 kg/m².      PHYSICAL EXAM:      General Appearance:   Alert, cooperative, no distress   HEENT:   Normocephalic, atraumatic, anicteric.     Neck:  Supple, symmetrical, trachea midline   Lungs:   Clear to auscultation bilaterally; no rales, rhonchi or wheezing; respirations unlabored    Heart::   Regular rate and rhythm; no murmur, " rub, or gallop.   Abdomen:   Soft, non-tender, non-distended; normal bowel sounds; no masses, no organomegaly    Genitalia:   Deferred    Rectal:   Deferred    Extremities:  No cyanosis, clubbing or edema    Pulses:  2+ and symmetric    Skin:  No jaundice, rashes, or lesions    Lymph nodes:  No palpable cervical lymphadenopathy        Lab Results:   No visits with results within 1 Day(s) from this visit.   Latest known visit with results is:   Office Visit on 08/22/2024   Component Date Value    Alpha-Hydroxyalprazolam * 08/22/2024 negative     Amphetamine Quantificati* 08/22/2024 negative     Aripiprazole Quantificat* 08/22/2024 negative     OPC-3373 QUANTIFICATION 08/22/2024 negative     EUTYLONE QUANTIFICATION 08/22/2024 negative     METHYLONE QUANTIFICATION 08/22/2024 negative     Buprenorphine Quantifica* 08/22/2024 negative     Norbuprenorphine Quantif* 08/22/2024 negative     Hydroxybupropion Quantif* 08/22/2024 negative     Butalbital Quantification 08/22/2024 negative     Carisoprodol Quantificat* 08/22/2024 negative     Meprobamate Quantificati* 08/22/2024 negative     7-Amino-Clonazepam Quant* 08/22/2024 negative     Clozapine Quantification 08/22/2024 negative     N-desmethylclozapine Fortino* 08/22/2024 negative     Cocaine metabolite Quant* 08/22/2024 negative     Codeine Quantification 08/22/2024 positive-> 98011-B (A)     Morphine Quantification 08/22/2024 positive-4875.777-I (A)     Hydrocodone Quantificati* 08/22/2024 negative     Norhydrocodone Quantific* 08/22/2024 negative     Hydromorphone Quantifica* 08/22/2024 negative     Dextromethorphan Quantif* 08/22/2024 negative     Dextrorphan (Dextrometho* 08/22/2024 negative     Nordiazepam Quantificati* 08/22/2024 negative     Temazepam Quantification 08/22/2024 negative     Oxazepam Quantification 08/22/2024 negative     Ethyl Glucuronide Quanti* 08/22/2024 negative     Ethyl Sulfate Quantifica* 08/22/2024 negative     Fentanyl Quantification  08/22/2024 negative     Norfentanyl Quantificati* 08/22/2024 negative     Acetyl fentanyl Quantifi* 08/22/2024 Fen Neg     Acetyl norfentanyl Quant* 08/22/2024 Fen Neg     Acryl fentanyl Quantific* 08/22/2024 Fen Neg     Carfentanil Quantificati* 08/22/2024 Fen Neg     Para-fluorofentanyl Rudy* 08/22/2024 Fen Neg     Haloperidol  Quantificat* 08/22/2024 negative     Haloperidol metabolite Q* 08/22/2024 negative     6-MAYRA (Heroin metabolite* 08/22/2024 negative     Hydrocodone Quantificati* 08/22/2024 negative     Norhydrocodone Quantific* 08/22/2024 negative     Hydromorphone Quantifica* 08/22/2024 negative     Hydromorphone Quantifica* 08/22/2024 negative     Mitragynine (Kratom yulisa* 08/22/2024 negative     1-TI-Khlszbsiiel (Kratom* 08/22/2024 negative     Lorazepam Quantification 08/22/2024 negative     MDMA Quantification 08/22/2024 negative     Methadone Quantification 08/22/2024 negative     EDDP (Methadone metaboli* 08/22/2024 negative     Amphetamine Quantificati* 08/22/2024 negative     Methamphetamine Quantifi* 08/22/2024 negative     Morphine Quantification 08/22/2024 positive-4875.777-I (A)     Hydromorphone Quantifica* 08/22/2024 negative     Oxazepam Quantification 08/22/2024 negative     Oxycodone Quantification 08/22/2024 negative     Noroxycodone Quantificat* 08/22/2024 negative     Oxymorphone Quantificati* 08/22/2024 negative     Oxymorphone Quantificati* 08/22/2024 negative     PENTOBARBITAL QUANTIFICA* 08/22/2024 negative     Phencyclidine Quantifica* 08/22/2024 negative     Phenobarbital Quantifica* 08/22/2024 negative     PHENTERMINE QUANTIFICATI* 08/22/2024 negative     5F-ADB-M7 08/22/2024 negative     BY-FQOTIWWL-T1 METABOLIT* 08/22/2024 negative     ILRX-GNWTDYLU-B1 METABOL* 08/22/2024 negative     OXL131 metabolite Quanti* 08/22/2024 negative     TOU230 metabolite Quanti* 08/22/2024 negative     RCS4 METABOLITE QUANTIFI* 08/22/2024 negative     XLR11/ METABOLITE Q* 08/22/2024  negative     Tapentadol Quantification 08/22/2024 negative     Temazepam Quantification 08/22/2024 negative     Oxazepam Quantification 08/22/2024 negative     cTHC (Marijuana metaboli* 08/22/2024 positive-768.668-I (A)     Tramadol Quantification 08/22/2024 negative     O-desmethyl-tramadol Fortino* 08/22/2024 negative     N-DESMETHYL-TRAMADOL FORTINO* 08/22/2024 negative     XYLAZINE 08/22/2024 negative     4-HYDROXY XYLAZINE 08/22/2024 negative          Radiology Results:   No results found.    Answers submitted by the patient for this visit:  Abdominal Pain Questionnaire (Submitted on 9/9/2024)  Chief Complaint: Abdominal pain  Frequency: every several days  Episode duration: 4 Days  Progression since onset: gradually worsening  Pain location: LUQ, epigastric region  Pain - numeric: 2/10  Pain quality: dull  Radiates to: epigastric region, left shoulder, right shoulder, chest  anorexia: Yes  arthralgias: Yes  belching: Yes  constipation: No  diarrhea: No  dysuria: No  fever: No  flatus: Yes  frequency: No  headaches: Yes  hematochezia: No  hematuria: No  melena: No  myalgias: Yes  nausea: No  weight loss: Yes  vomiting: No  Aggravated by: being still, bowel movement, certain positions  Relieved by: certain positions, sitting up, standing

## 2024-09-10 ENCOUNTER — LAB (OUTPATIENT)
Dept: LAB | Facility: CLINIC | Age: 44
End: 2024-09-10
Payer: MEDICARE

## 2024-09-10 DIAGNOSIS — R63.4 WEIGHT LOSS: ICD-10-CM

## 2024-09-10 DIAGNOSIS — I10 ESSENTIAL (PRIMARY) HYPERTENSION: ICD-10-CM

## 2024-09-10 LAB
ALBUMIN SERPL BCG-MCNC: 4.9 G/DL (ref 3.5–5)
ALP SERPL-CCNC: 74 U/L (ref 34–104)
ALT SERPL W P-5'-P-CCNC: 37 U/L (ref 7–52)
ANION GAP SERPL CALCULATED.3IONS-SCNC: 9 MMOL/L (ref 4–13)
AST SERPL W P-5'-P-CCNC: 24 U/L (ref 13–39)
BASOPHILS # BLD AUTO: 0.06 THOUSANDS/ÂΜL (ref 0–0.1)
BASOPHILS NFR BLD AUTO: 1 % (ref 0–1)
BILIRUB SERPL-MCNC: 0.7 MG/DL (ref 0.2–1)
BUN SERPL-MCNC: 18 MG/DL (ref 5–25)
CALCIUM SERPL-MCNC: 9.9 MG/DL (ref 8.4–10.2)
CHLORIDE SERPL-SCNC: 101 MMOL/L (ref 96–108)
CHOLEST SERPL-MCNC: 224 MG/DL
CO2 SERPL-SCNC: 29 MMOL/L (ref 21–32)
CREAT SERPL-MCNC: 0.88 MG/DL (ref 0.6–1.3)
CRP SERPL QL: 1.5 MG/L
EOSINOPHIL # BLD AUTO: 0.4 THOUSAND/ÂΜL (ref 0–0.61)
EOSINOPHIL NFR BLD AUTO: 5 % (ref 0–6)
ERYTHROCYTE [DISTWIDTH] IN BLOOD BY AUTOMATED COUNT: 12.9 % (ref 11.6–15.1)
ERYTHROCYTE [SEDIMENTATION RATE] IN BLOOD: 14 MM/HOUR (ref 0–14)
GFR SERPL CREATININE-BSD FRML MDRD: 105 ML/MIN/1.73SQ M
GLUCOSE P FAST SERPL-MCNC: 99 MG/DL (ref 65–99)
HCT VFR BLD AUTO: 47.6 % (ref 36.5–49.3)
HDLC SERPL-MCNC: 40 MG/DL
HGB BLD-MCNC: 16.2 G/DL (ref 12–17)
IMM GRANULOCYTES # BLD AUTO: 0.01 THOUSAND/UL (ref 0–0.2)
IMM GRANULOCYTES NFR BLD AUTO: 0 % (ref 0–2)
LDLC SERPL CALC-MCNC: 153 MG/DL (ref 0–100)
LYMPHOCYTES # BLD AUTO: 2.27 THOUSANDS/ÂΜL (ref 0.6–4.47)
LYMPHOCYTES NFR BLD AUTO: 29 % (ref 14–44)
MCH RBC QN AUTO: 28.9 PG (ref 26.8–34.3)
MCHC RBC AUTO-ENTMCNC: 34 G/DL (ref 31.4–37.4)
MCV RBC AUTO: 85 FL (ref 82–98)
MONOCYTES # BLD AUTO: 0.66 THOUSAND/ÂΜL (ref 0.17–1.22)
MONOCYTES NFR BLD AUTO: 8 % (ref 4–12)
NEUTROPHILS # BLD AUTO: 4.48 THOUSANDS/ÂΜL (ref 1.85–7.62)
NEUTS SEG NFR BLD AUTO: 57 % (ref 43–75)
NONHDLC SERPL-MCNC: 184 MG/DL
NRBC BLD AUTO-RTO: 0 /100 WBCS
PLATELET # BLD AUTO: 397 THOUSANDS/UL (ref 149–390)
PMV BLD AUTO: 9.8 FL (ref 8.9–12.7)
POTASSIUM SERPL-SCNC: 4.5 MMOL/L (ref 3.5–5.3)
PROT SERPL-MCNC: 7.7 G/DL (ref 6.4–8.4)
RBC # BLD AUTO: 5.61 MILLION/UL (ref 3.88–5.62)
SODIUM SERPL-SCNC: 139 MMOL/L (ref 135–147)
TRIGL SERPL-MCNC: 154 MG/DL
TSH SERPL DL<=0.05 MIU/L-ACNC: 1.91 UIU/ML (ref 0.45–4.5)
WBC # BLD AUTO: 7.88 THOUSAND/UL (ref 4.31–10.16)

## 2024-09-10 PROCEDURE — 84443 ASSAY THYROID STIM HORMONE: CPT

## 2024-09-10 PROCEDURE — 86140 C-REACTIVE PROTEIN: CPT

## 2024-09-10 PROCEDURE — 85025 COMPLETE CBC W/AUTO DIFF WBC: CPT

## 2024-09-10 PROCEDURE — 36415 COLL VENOUS BLD VENIPUNCTURE: CPT

## 2024-09-10 PROCEDURE — 85652 RBC SED RATE AUTOMATED: CPT

## 2024-09-10 PROCEDURE — 80061 LIPID PANEL: CPT

## 2024-09-10 PROCEDURE — 80053 COMPREHEN METABOLIC PANEL: CPT

## 2024-09-11 ENCOUNTER — TELEPHONE (OUTPATIENT)
Age: 44
End: 2024-09-11

## 2024-09-11 NOTE — TELEPHONE ENCOUNTER
Scheduled date of EGD/colonoscopy (as of today):09.17.24    Physician performing EGD/colonoscopy:Ubaldo    Location of EGD/colonoscopy:[facility]    Pt called to accept sooner appt offered at [facility]    Pt concerned about prep, we discussed some tips to try to help him with that.

## 2024-09-16 ENCOUNTER — HOSPITAL ENCOUNTER (EMERGENCY)
Facility: HOSPITAL | Age: 44
Discharge: HOME | End: 2024-09-16
Attending: EMERGENCY MEDICINE
Payer: COMMERCIAL

## 2024-09-16 ENCOUNTER — TELEPHONE (OUTPATIENT)
Age: 44
End: 2024-09-16

## 2024-09-16 VITALS
WEIGHT: 219.36 LBS | HEART RATE: 78 BPM | OXYGEN SATURATION: 97 % | DIASTOLIC BLOOD PRESSURE: 100 MMHG | RESPIRATION RATE: 18 BRPM | BODY MASS INDEX: 30.59 KG/M2 | SYSTOLIC BLOOD PRESSURE: 156 MMHG | TEMPERATURE: 98.4 F

## 2024-09-16 DIAGNOSIS — R63.4 WEIGHT LOSS: ICD-10-CM

## 2024-09-16 DIAGNOSIS — R10.9 ABDOMINAL PAIN: Primary | ICD-10-CM

## 2024-09-16 DIAGNOSIS — R63.0 DECREASED APPETITE: ICD-10-CM

## 2024-09-16 LAB
ALBUMIN SERPL BCG-MCNC: 4.9 G/DL (ref 3.5–5)
ALP SERPL-CCNC: 80 U/L (ref 34–104)
ALT SERPL W P-5'-P-CCNC: 41 U/L (ref 7–52)
ANION GAP SERPL CALCULATED.3IONS-SCNC: 9 MMOL/L (ref 4–13)
AST SERPL W P-5'-P-CCNC: 22 U/L (ref 13–39)
BASOPHILS # BLD AUTO: 0.06 THOUSANDS/ΜL (ref 0–0.1)
BASOPHILS NFR BLD AUTO: 1 % (ref 0–1)
BILIRUB SERPL-MCNC: 0.7 MG/DL (ref 0.2–1)
BUN SERPL-MCNC: 20 MG/DL (ref 5–25)
CALCIUM SERPL-MCNC: 9.6 MG/DL (ref 8.4–10.2)
CHLORIDE SERPL-SCNC: 104 MMOL/L (ref 96–108)
CO2 SERPL-SCNC: 26 MMOL/L (ref 21–32)
CREAT SERPL-MCNC: 0.94 MG/DL (ref 0.6–1.3)
EOSINOPHIL # BLD AUTO: 0.33 THOUSAND/ΜL (ref 0–0.61)
EOSINOPHIL NFR BLD AUTO: 5 % (ref 0–6)
ERYTHROCYTE [DISTWIDTH] IN BLOOD BY AUTOMATED COUNT: 12.9 % (ref 11.6–15.1)
GFR SERPL CREATININE-BSD FRML MDRD: 98 ML/MIN/1.73SQ M
GLUCOSE SERPL-MCNC: 104 MG/DL (ref 65–140)
HCT VFR BLD AUTO: 47.5 % (ref 36.5–49.3)
HGB BLD-MCNC: 16.2 G/DL (ref 12–17)
IMM GRANULOCYTES # BLD AUTO: 0.01 THOUSAND/UL (ref 0–0.2)
IMM GRANULOCYTES NFR BLD AUTO: 0 % (ref 0–2)
LIPASE SERPL-CCNC: 23 U/L (ref 11–82)
LYMPHOCYTES # BLD AUTO: 1.69 THOUSANDS/ΜL (ref 0.6–4.47)
LYMPHOCYTES NFR BLD AUTO: 24 % (ref 14–44)
MCH RBC QN AUTO: 28.5 PG (ref 26.8–34.3)
MCHC RBC AUTO-ENTMCNC: 34.1 G/DL (ref 31.4–37.4)
MCV RBC AUTO: 84 FL (ref 82–98)
MONOCYTES # BLD AUTO: 0.65 THOUSAND/ΜL (ref 0.17–1.22)
MONOCYTES NFR BLD AUTO: 9 % (ref 4–12)
NEUTROPHILS # BLD AUTO: 4.22 THOUSANDS/ΜL (ref 1.85–7.62)
NEUTS SEG NFR BLD AUTO: 61 % (ref 43–75)
NRBC BLD AUTO-RTO: 0 /100 WBCS
PLATELET # BLD AUTO: 376 THOUSANDS/UL (ref 149–390)
PMV BLD AUTO: 9 FL (ref 8.9–12.7)
POTASSIUM SERPL-SCNC: 4.4 MMOL/L (ref 3.5–5.3)
PROT SERPL-MCNC: 7.9 G/DL (ref 6.4–8.4)
RBC # BLD AUTO: 5.68 MILLION/UL (ref 3.88–5.62)
SODIUM SERPL-SCNC: 139 MMOL/L (ref 135–147)
WBC # BLD AUTO: 6.96 THOUSAND/UL (ref 4.31–10.16)

## 2024-09-16 PROCEDURE — 36415 COLL VENOUS BLD VENIPUNCTURE: CPT

## 2024-09-16 PROCEDURE — 99284 EMERGENCY DEPT VISIT MOD MDM: CPT

## 2024-09-16 PROCEDURE — 83690 ASSAY OF LIPASE: CPT

## 2024-09-16 PROCEDURE — 80053 COMPREHEN METABOLIC PANEL: CPT

## 2024-09-16 PROCEDURE — 96361 HYDRATE IV INFUSION ADD-ON: CPT

## 2024-09-16 PROCEDURE — 99285 EMERGENCY DEPT VISIT HI MDM: CPT | Performed by: EMERGENCY MEDICINE

## 2024-09-16 PROCEDURE — 96374 THER/PROPH/DIAG INJ IV PUSH: CPT

## 2024-09-16 PROCEDURE — 93005 ELECTROCARDIOGRAM TRACING: CPT

## 2024-09-16 PROCEDURE — 85025 COMPLETE CBC W/AUTO DIFF WBC: CPT

## 2024-09-16 RX ORDER — PANTOPRAZOLE SODIUM 40 MG/10ML
40 INJECTION, POWDER, LYOPHILIZED, FOR SOLUTION INTRAVENOUS ONCE
Status: COMPLETED | OUTPATIENT
Start: 2024-09-16 | End: 2024-09-16

## 2024-09-16 RX ORDER — DICYCLOMINE HCL 20 MG
20 TABLET ORAL ONCE
Status: COMPLETED | OUTPATIENT
Start: 2024-09-16 | End: 2024-09-16

## 2024-09-16 RX ORDER — FAMOTIDINE 20 MG/1
20 TABLET, FILM COATED ORAL ONCE
Status: COMPLETED | OUTPATIENT
Start: 2024-09-16 | End: 2024-09-16

## 2024-09-16 RX ORDER — DICYCLOMINE HCL 20 MG
20 TABLET ORAL 2 TIMES DAILY
Qty: 20 TABLET | Refills: 0 | Status: SHIPPED | OUTPATIENT
Start: 2024-09-16

## 2024-09-16 RX ADMIN — PANTOPRAZOLE SODIUM 40 MG: 40 INJECTION, POWDER, FOR SOLUTION INTRAVENOUS at 16:18

## 2024-09-16 RX ADMIN — SODIUM CHLORIDE 1000 ML: 0.9 INJECTION, SOLUTION INTRAVENOUS at 16:16

## 2024-09-16 RX ADMIN — FAMOTIDINE 20 MG: 20 TABLET, FILM COATED ORAL at 16:18

## 2024-09-16 RX ADMIN — DICYCLOMINE HYDROCHLORIDE 20 MG: 20 TABLET ORAL at 17:17

## 2024-09-16 NOTE — ED PROVIDER NOTES
1. Abdominal pain    2. Weight loss    3. Decreased appetite      ED Disposition       ED Disposition   Discharge    Condition   Stable    Date/Time   Mon Sep 16, 2024  5:15 PM    Comment   Manuel Salas discharge to home/self care.                   Assessment & Plan       Medical Decision Making  Patient seen and examined.  There is epigastric tenderness.  Differential includes but is not limited to PUD, gastritis, autoimmune disease, pancreatitis, cholecystitis, colitis; doubt ACS, volvulus, SBO.  Appropriate labs and meds ordered.  Will defer scan to outpatient scan at this time.    Labs WNL.  Comfort slightly improved with Bentyl.  Patient is agreeable to discharge home with prescription for Bentyl and instructions to take bowel prep as instructed by gastroenterologist.  All questions answered and return precautions discussed.    Amount and/or Complexity of Data Reviewed  Labs: ordered.    Risk  Prescription drug management.                        Medications   famotidine (PEPCID) tablet 20 mg (20 mg Oral Given 9/16/24 1618)   pantoprazole (PROTONIX) injection 40 mg (40 mg Intravenous Given 9/16/24 1618)   sodium chloride 0.9 % bolus 1,000 mL (0 mL Intravenous Stopped 9/16/24 1727)   dicyclomine (BENTYL) tablet 20 mg (20 mg Oral Given 9/16/24 1717)       History of Present Illness       44-year-old male complaining of upper abdominal gnawing and wrenching sensation, decreased appetite, weight loss.  He states since the beginning of summer he has had discomfort in the abdomen abdomen.  He is scheduled for colonoscopy and endoscopy tomorrow.  He denies pain and just says it is a discomfort.  Also notes approximately 35 pound weight loss during this time which he attributes to significantly decreased appetite.  At this time he is eating mostly water and bread with very small amounts of protein.  Discomfort is worsened by laying down.  He denies fevers.  He does endorse episodes of chills with sweating that  have been ongoing for 3+ weeks and typically happen when he stands up.  He denies chest pain, shortness of breath, vomiting, diarrhea.  He states last bowel movement was this morning and he is having normal bowel movements however they are smaller in volume.  He states his gastroenterologist put him on Protonix which he does not think is helping.  CT chest abdomen pelvis is scheduled for next week.      History provided by:  Patient          Review of Systems   Constitutional:  Positive for appetite change, chills, fatigue and unexpected weight change. Negative for fever.   HENT:  Negative for congestion, ear pain, postnasal drip, rhinorrhea, sinus pain and sore throat.    Eyes:  Negative for pain and visual disturbance.   Respiratory:  Negative for cough, chest tightness and shortness of breath.    Cardiovascular:  Negative for chest pain and palpitations.   Gastrointestinal:  Positive for abdominal pain. Negative for constipation, diarrhea, nausea and vomiting.   Genitourinary:  Negative for difficulty urinating, dysuria and hematuria.   Musculoskeletal:  Negative for back pain.   Skin:  Negative for color change and rash.   Neurological:  Negative for dizziness, seizures, syncope, weakness, light-headedness, numbness and headaches.   All other systems reviewed and are negative.          Objective     ED Triage Vitals [09/16/24 1513]   Temperature Pulse Blood Pressure Respirations SpO2 Patient Position - Orthostatic VS   98.4 °F (36.9 °C) (!) 109 (!) 187/111 18 98 % Standing      Temp Source Heart Rate Source BP Location FiO2 (%) Pain Score    Oral Monitor Right arm -- No Pain        Physical Exam  Constitutional:       General: He is not in acute distress.     Appearance: He is not diaphoretic.   HENT:      Head: Normocephalic and atraumatic.      Nose: No congestion or rhinorrhea.      Mouth/Throat:      Mouth: Mucous membranes are moist.      Pharynx: No oropharyngeal exudate.   Eyes:      General: No scleral  icterus.  Cardiovascular:      Rate and Rhythm: Normal rate and regular rhythm.      Heart sounds: Normal heart sounds. No murmur heard.     No friction rub. No gallop.   Pulmonary:      Effort: No respiratory distress.      Breath sounds: Normal breath sounds. No wheezing, rhonchi or rales.   Abdominal:      General: Abdomen is flat. There is no distension.      Palpations: Abdomen is soft.      Tenderness: There is abdominal tenderness in the epigastric area. There is no right CVA tenderness, left CVA tenderness, guarding or rebound. Negative signs include Gallardo's sign and McBurney's sign.   Lymphadenopathy:      Cervical: No cervical adenopathy.   Skin:     General: Skin is warm and dry.      Capillary Refill: Capillary refill takes less than 2 seconds.   Neurological:      General: No focal deficit present.      Mental Status: He is alert and oriented to person, place, and time.         Labs Reviewed   CBC AND DIFFERENTIAL - Abnormal       Result Value    WBC 6.96      RBC 5.68 (*)     Hemoglobin 16.2      Hematocrit 47.5      MCV 84      MCH 28.5      MCHC 34.1      RDW 12.9      MPV 9.0      Platelets 376      nRBC 0      Segmented % 61      Immature Grans % 0      Lymphocytes % 24      Monocytes % 9      Eosinophils Relative 5      Basophils Relative 1      Absolute Neutrophils 4.22      Absolute Immature Grans 0.01      Absolute Lymphocytes 1.69      Absolute Monocytes 0.65      Eosinophils Absolute 0.33      Basophils Absolute 0.06     LIPASE - Normal    Lipase 23     COMPREHENSIVE METABOLIC PANEL    Sodium 139      Potassium 4.4      Chloride 104      CO2 26      ANION GAP 9      BUN 20      Creatinine 0.94      Glucose 104      Calcium 9.6      AST 22      ALT 41      Alkaline Phosphatase 80      Total Protein 7.9      Albumin 4.9      Total Bilirubin 0.70      eGFR 98      Narrative:     National Kid[employer] Foundation guidelines for Chronic Kidney Disease (CKD):     Stage 1 with normal or high GFR  (GFR > 90 mL/min/1.73 square meters)    Stage 2 Mild CKD (GFR = 60-89 mL/min/1.73 square meters)    Stage 3A Moderate CKD (GFR = 45-59 mL/min/1.73 square meters)    Stage 3B Moderate CKD (GFR = 30-44 mL/min/1.73 square meters)    Stage 4 Severe CKD (GFR = 15-29 mL/min/1.73 square meters)    Stage 5 End Stage CKD (GFR <15 mL/min/1.73 square meters)  Note: GFR calculation is accurate only with a steady state creatinine     No orders to display       ECG 12 Lead Documentation Only    Date/Time: 9/16/2024 6:29 PM    Performed by: Miguel Bailey MD  Authorized by: Miguel Bailey MD    Indications / Diagnosis:  Epigastric pain  ECG reviewed by me, the ED Provider: yes    Patient location:  ED  Previous ECG:     Previous ECG:  Compared to current    Similarity:  No change    Comparison to cardiac monitor: Yes    Interpretation:     Interpretation: normal    Rate:     ECG rate:  70    ECG rate assessment: normal    Rhythm:     Rhythm: sinus rhythm    Ectopy:     Ectopy: none    QRS:     QRS axis:  Normal    QRS intervals:  Normal  Conduction:     Conduction: normal    ST segments:     ST segments:  Normal  T waves:     T waves: normal             Miguel Bailey MD  09/16/24 [id number]

## 2024-09-16 NOTE — DISCHARGE INSTRUCTIONS
Please take Bentyl 2 times per day as needed for abdominal pain.    Please take a bowel prep as instructed by gastroenterologist and attend colonoscopy/endoscopy tomorrow.

## 2024-09-16 NOTE — TELEPHONE ENCOUNTER
Pt's wife called in regard to pt's arrival time for procedures. Due to consent form not updated was not able to inform pt's spouse of time.

## 2024-09-17 ENCOUNTER — HOSPITAL ENCOUNTER (OUTPATIENT)
Dept: GASTROENTEROLOGY | Facility: HOSPITAL | Age: 44
Setting detail: OUTPATIENT SURGERY
Discharge: HOME | End: 2024-09-17
Attending: INTERNAL MEDICINE
Payer: MEDICARE

## 2024-09-17 ENCOUNTER — ANESTHESIA EVENT (OUTPATIENT)
Dept: GASTROENTEROLOGY | Facility: HOSPITAL | Age: 44
End: 2024-09-17
Payer: MEDICARE

## 2024-09-17 ENCOUNTER — ANESTHESIA (OUTPATIENT)
Dept: GASTROENTEROLOGY | Facility: HOSPITAL | Age: 44
End: 2024-09-17
Payer: MEDICARE

## 2024-09-17 VITALS
DIASTOLIC BLOOD PRESSURE: 96 MMHG | HEIGHT: 71 IN | SYSTOLIC BLOOD PRESSURE: 141 MMHG | OXYGEN SATURATION: 97 % | RESPIRATION RATE: 16 BRPM | BODY MASS INDEX: 31.11 KG/M2 | WEIGHT: 222.22 LBS | TEMPERATURE: 97.9 F | HEART RATE: 60 BPM

## 2024-09-17 DIAGNOSIS — K21.9 GASTROESOPHAGEAL REFLUX DISEASE WITHOUT ESOPHAGITIS: ICD-10-CM

## 2024-09-17 DIAGNOSIS — R63.4 UNINTENTIONAL WEIGHT LOSS: ICD-10-CM

## 2024-09-17 PROBLEM — F17.200 SMOKING: Status: ACTIVE | Noted: 2024-09-17

## 2024-09-17 PROBLEM — IMO0001 SMOKING: Status: ACTIVE | Noted: 2024-09-17

## 2024-09-17 PROCEDURE — 43239 EGD BIOPSY SINGLE/MULTIPLE: CPT | Performed by: INTERNAL MEDICINE

## 2024-09-17 PROCEDURE — 88305 TISSUE EXAM BY PATHOLOGIST: CPT | Performed by: PATHOLOGY

## 2024-09-17 PROCEDURE — 45380 COLONOSCOPY AND BIOPSY: CPT | Performed by: INTERNAL MEDICINE

## 2024-09-17 RX ORDER — GLYCOPYRROLATE 0.2 MG/ML
INJECTION INTRAMUSCULAR; INTRAVENOUS AS NEEDED
Status: DISCONTINUED | OUTPATIENT
Start: 2024-09-17 | End: 2024-09-17

## 2024-09-17 RX ORDER — PROPOFOL 10 MG/ML
INJECTION, EMULSION INTRAVENOUS AS NEEDED
Status: DISCONTINUED | OUTPATIENT
Start: 2024-09-17 | End: 2024-09-17

## 2024-09-17 RX ORDER — KETAMINE HYDROCHLORIDE 50 MG/ML
INJECTION, SOLUTION INTRAMUSCULAR; INTRAVENOUS AS NEEDED
Status: DISCONTINUED | OUTPATIENT
Start: 2024-09-17 | End: 2024-09-17

## 2024-09-17 RX ORDER — LIDOCAINE HYDROCHLORIDE 20 MG/ML
INJECTION, SOLUTION EPIDURAL; INFILTRATION; INTRACAUDAL; PERINEURAL AS NEEDED
Status: DISCONTINUED | OUTPATIENT
Start: 2024-09-17 | End: 2024-09-17

## 2024-09-17 RX ORDER — SODIUM CHLORIDE, SODIUM LACTATE, POTASSIUM CHLORIDE, CALCIUM CHLORIDE 600; 310; 30; 20 MG/100ML; MG/100ML; MG/100ML; MG/100ML
INJECTION, SOLUTION INTRAVENOUS CONTINUOUS PRN
Status: DISCONTINUED | OUTPATIENT
Start: 2024-09-17 | End: 2024-09-17

## 2024-09-17 RX ADMIN — PROPOFOL 20 MG: 10 INJECTION, EMULSION INTRAVENOUS at 11:16

## 2024-09-17 RX ADMIN — PROPOFOL 20 MG: 10 INJECTION, EMULSION INTRAVENOUS at 11:19

## 2024-09-17 RX ADMIN — LIDOCAINE HYDROCHLORIDE 100 MG: 20 INJECTION, SOLUTION EPIDURAL; INFILTRATION; INTRACAUDAL; PERINEURAL at 11:11

## 2024-09-17 RX ADMIN — PROPOFOL 150 MG: 10 INJECTION, EMULSION INTRAVENOUS at 11:11

## 2024-09-17 RX ADMIN — SODIUM CHLORIDE, SODIUM LACTATE, POTASSIUM CHLORIDE, AND CALCIUM CHLORIDE: .6; .31; .03; .02 INJECTION, SOLUTION INTRAVENOUS at 11:08

## 2024-09-17 RX ADMIN — KETAMINE HYDROCHLORIDE 30 MG: 50 INJECTION INTRAMUSCULAR; INTRAVENOUS at 11:12

## 2024-09-17 RX ADMIN — PROPOFOL 50 MG: 10 INJECTION, EMULSION INTRAVENOUS at 11:12

## 2024-09-17 RX ADMIN — PROPOFOL 20 MG: 10 INJECTION, EMULSION INTRAVENOUS at 11:27

## 2024-09-17 RX ADMIN — PROPOFOL 20 MG: 10 INJECTION, EMULSION INTRAVENOUS at 11:22

## 2024-09-17 RX ADMIN — Medication 40 MG: at 11:25

## 2024-09-17 RX ADMIN — PROPOFOL 20 MG: 10 INJECTION, EMULSION INTRAVENOUS at 11:25

## 2024-09-17 RX ADMIN — GLYCOPYRROLATE 0.2 MCG: 0.2 INJECTION INTRAMUSCULAR; INTRAVENOUS at 11:11

## 2024-09-17 NOTE — INTERVAL H&P NOTE
H&P reviewed. After examining the patient I find no changes in the patients condition since the H&P had been written.    Vitals:    09/17/24 0957   BP: 142/85   Pulse: 76   Resp: 15   Temp: 97.5 °F (36.4 °C)   SpO2: 98%

## 2024-09-17 NOTE — ANESTHESIA PREPROCEDURE EVALUATION
Procedure:  COLONOSCOPY  EGD    Relevant Problems   ANESTHESIA (within normal limits)      CARDIO   (+) Benign essential hypertension   (+) Essential (primary) hypertension      ENDO (within normal limits)      GI/HEPATIC   (+) Gastroesophageal reflux disease without esophagitis      MUSCULOSKELETAL   (+) Cervical spondylosis      NEURO/PSYCH   (+) Chronic pain syndrome   (+) Chronic traumatic pain   (+) PTSD (post-traumatic stress disorder)      PULMONARY   (+) Smoking (Marijuana)      Orthopedic/Musculoskeletal   (+) Cervical radiculitis        Physical Exam    Airway    Mallampati score: II  TM Distance: >3 FB  Neck ROM: full     Dental   No notable dental hx     Cardiovascular  Cardiovascular exam normal    Pulmonary  Pulmonary exam normal     Other Findings        Anesthesia Plan  ASA Score- 2     Anesthesia Type- IV sedation with anesthesia with ASA Monitors.         Additional Monitors:     Airway Plan:            Plan Factors-Exercise tolerance (METS): >4 METS.    Chart reviewed. EKG reviewed. Imaging results reviewed. Existing labs reviewed. Patient summary reviewed.    Patient is a current smoker.  Patient instructed to abstain from smoking on day of procedure. Patient did not smoke on day of surgery.    Obstructive sleep apnea risk education given perioperatively.        Induction- intravenous.    Postoperative Plan-         Informed Consent- Anesthetic plan and risks discussed with patient.  I personally reviewed this patient with the CRNA. Discussed and agreed on the Anesthesia Plan with the CRNA..

## 2024-09-17 NOTE — ED ATTENDING ATTESTATION
9/16/2024  IJemal DO, saw and evaluated the patient. I have discussed the patient with the resident/non-physician practitioner and agree with the resident's/non-physician practitioner's findings, Plan of Care, and MDM as documented in the resident's/non-physician practitioner's note, except where noted. All available labs and Radiology studies were reviewed.  I was present for key portions of any procedure(s) performed by the resident/non-physician practitioner and I was immediately available to provide assistance.       At this point I agree with the current assessment done in the Emergency Department.  I have conducted an independent evaluation of this patient a history and physical is as follows:    44yoM presenting to ER with ongoing sx of upper abd sx which he notes as hard to qualify but denies pain, fullness. CBC, CMP, Lipase without acute patho. Abd exam benign. Pt has endoscopy tomorrow and notes one of main concerns which caused presentation to ER was undergoing prep. Explained to pt that hydration status, electrolytes all WNL and safe to undergo prep. EKG without acute findings. Continue gastric support (PPI, H2). Has CT chest/abd/pelv ordered as outpt. Being closely followed by GI/PCP. Reviewed all findings both relevant and incidental with the patient at bedside. Pt verbalized understanding of findings, neccesary follow up, return to ED precautions. Pt agreed to review today's findings with their primary care provider. Pt non-toxic appearing upon discharge.       ED Course  ED Course as of 09/16/24 2011   Mon Sep 16, 2024   1530 2/2024 Weight - Wt 111 kg (245 lb)     1530 Outpt CT chest/abd/pelvis ordered.    1531 CBC, CMP done 6 days ago without acute patho.          Critical Care Time  Procedures

## 2024-09-17 NOTE — ANESTHESIA POSTPROCEDURE EVALUATION
Post-Op Assessment Note    CV Status:  Stable  Pain Score: 0    Pain management: adequate       Mental Status:  Alert and awake   Hydration Status:  Euvolemic   PONV Controlled:  Controlled   Airway Patency:  Patent     Post Op Vitals Reviewed: Yes    No anethesia notable event occurred.    Staff: CRNA               /80 (09/17/24 1135)    Temp 97.9 °F (36.6 °C) (09/17/24 1135)    Pulse 74 (09/17/24 1135)   Resp 18 (09/17/24 1135)    SpO2 98 % (09/17/24 1135)

## 2024-09-18 LAB
ATRIAL RATE: 70 BPM
P AXIS: 40 DEGREES
PR INTERVAL: 144 MS
QRS AXIS: 40 DEGREES
QRSD INTERVAL: 98 MS
QT INTERVAL: 394 MS
QTC INTERVAL: 425 MS
T WAVE AXIS: 54 DEGREES
VENTRICULAR RATE: 70 BPM

## 2024-09-18 PROCEDURE — 93010 ELECTROCARDIOGRAM REPORT: CPT | Performed by: INTERNAL MEDICINE

## 2024-09-19 PROCEDURE — 88305 TISSUE EXAM BY PATHOLOGIST: CPT | Performed by: PATHOLOGY

## 2024-09-23 ENCOUNTER — TELEPHONE (OUTPATIENT)
Dept: GASTROENTEROLOGY | Facility: CLINIC | Age: 44
End: 2024-09-23

## 2024-10-07 DIAGNOSIS — M54.12 CERVICAL RADICULITIS: ICD-10-CM

## 2024-10-07 DIAGNOSIS — G89.21 CHRONIC TRAUMATIC PAIN: ICD-10-CM

## 2024-10-07 DIAGNOSIS — M50.223 HERNIATION OF INTERVERTEBRAL DISC AT C6-C7 LEVEL: ICD-10-CM

## 2024-10-08 RX ORDER — ACETAMINOPHEN AND CODEINE PHOSPHATE 300; 30 MG/1; MG/1
1 TABLET ORAL EVERY 4 HOURS PRN
Qty: 30 TABLET | Refills: 0 | Status: SHIPPED | OUTPATIENT
Start: 2024-10-08

## 2024-10-18 ENCOUNTER — HOSPITAL ENCOUNTER (OUTPATIENT)
Dept: NUCLEAR MEDICINE | Facility: HOSPITAL | Age: 44
End: 2024-10-18
Attending: INTERNAL MEDICINE
Payer: MEDICARE

## 2024-10-18 DIAGNOSIS — R68.81 EARLY SATIETY: ICD-10-CM

## 2024-10-18 DIAGNOSIS — R63.4 UNINTENTIONAL WEIGHT LOSS: ICD-10-CM

## 2024-10-18 DIAGNOSIS — K21.9 GASTROESOPHAGEAL REFLUX DISEASE WITHOUT ESOPHAGITIS: ICD-10-CM

## 2024-10-18 PROCEDURE — 78264 GASTRIC EMPTYING IMG STUDY: CPT

## 2024-10-18 PROCEDURE — A9541 TC99M SULFUR COLLOID: HCPCS

## 2024-11-07 DIAGNOSIS — M50.223 HERNIATION OF INTERVERTEBRAL DISC AT C6-C7 LEVEL: ICD-10-CM

## 2024-11-07 DIAGNOSIS — G89.21 CHRONIC TRAUMATIC PAIN: ICD-10-CM

## 2024-11-07 DIAGNOSIS — M54.12 CERVICAL RADICULITIS: ICD-10-CM

## 2024-11-07 RX ORDER — ACETAMINOPHEN AND CODEINE PHOSPHATE 300; 30 MG/1; MG/1
1 TABLET ORAL EVERY 4 HOURS PRN
Qty: 30 TABLET | Refills: 0 | Status: SHIPPED | OUTPATIENT
Start: 2024-11-07

## 2024-12-10 DIAGNOSIS — M54.12 CERVICAL RADICULITIS: ICD-10-CM

## 2024-12-10 DIAGNOSIS — M50.223 HERNIATION OF INTERVERTEBRAL DISC AT C6-C7 LEVEL: ICD-10-CM

## 2024-12-10 DIAGNOSIS — G89.21 CHRONIC TRAUMATIC PAIN: ICD-10-CM

## 2024-12-11 RX ORDER — ACETAMINOPHEN AND CODEINE PHOSPHATE 300; 30 MG/1; MG/1
1 TABLET ORAL EVERY 4 HOURS PRN
Qty: 30 TABLET | Refills: 0 | Status: SHIPPED | OUTPATIENT
Start: 2024-12-11

## 2025-01-17 DIAGNOSIS — G89.21 CHRONIC TRAUMATIC PAIN: ICD-10-CM

## 2025-01-17 DIAGNOSIS — M54.12 CERVICAL RADICULITIS: ICD-10-CM

## 2025-01-17 DIAGNOSIS — M50.223 HERNIATION OF INTERVERTEBRAL DISC AT C6-C7 LEVEL: ICD-10-CM

## 2025-01-17 RX ORDER — ACETAMINOPHEN AND CODEINE PHOSPHATE 300; 30 MG/1; MG/1
1 TABLET ORAL EVERY 4 HOURS PRN
Qty: 30 TABLET | Refills: 0 | Status: SHIPPED | OUTPATIENT
Start: 2025-01-17

## 2025-02-05 ENCOUNTER — VBI (OUTPATIENT)
Dept: ADMINISTRATIVE | Facility: OTHER | Age: 45
End: 2025-02-05

## 2025-02-05 NOTE — TELEPHONE ENCOUNTER
02/05/25 4:18 PM     Chart reviewed for Blood Pressure was/were submitted to the patient's insurance.     Delia Benítez MA   [facility]

## 2025-02-18 ENCOUNTER — VBI (OUTPATIENT)
Dept: ADMINISTRATIVE | Facility: OTHER | Age: 45
End: 2025-02-18

## 2025-02-18 NOTE — TELEPHONE ENCOUNTER
02/18/25 2:34 PM     Chart reviewed for Immunization(s) Influenza  ; nothing is submitted to the patient's insurance at this time.     Carrie Pritchard MA   [facility]

## 2025-02-27 ENCOUNTER — OFFICE VISIT (OUTPATIENT)
Dept: FAMILY MEDICINE CLINIC | Facility: CLINIC | Age: 45
End: 2025-02-27
Payer: MEDICARE

## 2025-02-27 VITALS
OXYGEN SATURATION: 98 % | SYSTOLIC BLOOD PRESSURE: 122 MMHG | TEMPERATURE: 97.2 F | BODY MASS INDEX: 31.36 KG/M2 | HEIGHT: 71 IN | DIASTOLIC BLOOD PRESSURE: 72 MMHG | WEIGHT: 224 LBS | HEART RATE: 82 BPM

## 2025-02-27 DIAGNOSIS — R63.4 WEIGHT LOSS: ICD-10-CM

## 2025-02-27 DIAGNOSIS — R42 DIZZINESS: ICD-10-CM

## 2025-02-27 DIAGNOSIS — S06.9XAS TRAUMATIC BRAIN INJURY, WITH UNKNOWN LOSS OF CONSCIOUSNESS STATUS, SEQUELA (HCC): ICD-10-CM

## 2025-02-27 DIAGNOSIS — F11.20 CONTINUOUS OPIOID DEPENDENCE (HCC): ICD-10-CM

## 2025-02-27 DIAGNOSIS — Z00.00 ENCOUNTER FOR ANNUAL WELLNESS VISIT (AWV) IN MEDICARE PATIENT: Primary | ICD-10-CM

## 2025-02-27 DIAGNOSIS — G43.809 OTHER MIGRAINE WITHOUT STATUS MIGRAINOSUS, NOT INTRACTABLE: ICD-10-CM

## 2025-02-27 DIAGNOSIS — E66.01 OBESITY, MORBID (HCC): ICD-10-CM

## 2025-02-27 PROCEDURE — G0438 PPPS, INITIAL VISIT: HCPCS | Performed by: STUDENT IN AN ORGANIZED HEALTH CARE EDUCATION/TRAINING PROGRAM

## 2025-02-27 PROCEDURE — 99214 OFFICE O/P EST MOD 30 MIN: CPT | Performed by: STUDENT IN AN ORGANIZED HEALTH CARE EDUCATION/TRAINING PROGRAM

## 2025-02-27 RX ORDER — SUMATRIPTAN 50 MG/1
50 TABLET, FILM COATED ORAL ONCE AS NEEDED
Qty: 10 TABLET | Refills: 0 | Status: SHIPPED | OUTPATIENT
Start: 2025-02-27

## 2025-02-27 RX ORDER — METOCLOPRAMIDE 10 MG/1
10 TABLET ORAL 4 TIMES DAILY
Qty: 30 TABLET | Refills: 0 | Status: SHIPPED | OUTPATIENT
Start: 2025-02-27

## 2025-02-27 RX ORDER — HYDROCODONE BITARTRATE AND ACETAMINOPHEN 5; 325 MG/1; MG/1
1 TABLET ORAL DAILY
Qty: 30 TABLET | Refills: 0 | Status: SHIPPED | OUTPATIENT
Start: 2025-02-27

## 2025-02-27 RX ORDER — HYDROXYZINE HYDROCHLORIDE 25 MG/1
25 TABLET, FILM COATED ORAL
COMMUNITY
Start: 2025-02-04 | End: 2025-02-27

## 2025-02-27 RX ORDER — LISINOPRIL 10 MG/1
10 TABLET ORAL DAILY
COMMUNITY

## 2025-02-27 NOTE — ASSESSMENT & PLAN NOTE
Feels like current regiment is not helping anymore, is interested in trial a different medication   Will trial norco daily 5-325  UTD with UDS/U tox

## 2025-02-27 NOTE — ASSESSMENT & PLAN NOTE
Follows [facility]  Has a pending MRI Brain   Tried Oral Ketamine to help with anxiety without improvement

## 2025-02-27 NOTE — PATIENT INSTRUCTIONS
Medicare Preventive Visit Patient Instructions  Thank you for completing your Welcome to Medicare Visit or Medicare Annual Wellness Visit today. Your next wellness visit will be due in one year (2/28/2026).  The screening/preventive services that you may require over the next 5-10 years are detailed below. Some tests may not apply to you based off risk factors and/or age. Screening tests ordered at today's visit but not completed yet may show as past due. Also, please note that scanned in results may not display below.  Preventive Screenings:  Service Recommendations Previous Testing/Comments   Colorectal Cancer Screening  Colonoscopy    Fecal Occult Blood Test (FOBT)/Fecal Immunochemical Test (FIT)  Fecal DNA/Cologuard Test  Flexible Sigmoidoscopy Age: 45-75 years old   Colonoscopy: every 10 years (May be performed more frequently if at higher risk)  OR  FOBT/FIT: every 1 year  OR  Cologuard: every 3 years  OR  Sigmoidoscopy: every 5 years  Screening may be recommended earlier than age 45 if at higher risk for colorectal cancer. Also, an individualized decision between you and your healthcare provider will decide whether screening between the ages of 76-85 would be appropriate. Colonoscopy: 09/17/2024  FOBT/FIT: Not on file  Cologuard: Not on file  Sigmoidoscopy: Not on file    Screening Current     Prostate Cancer Screening Individualized decision between patient and health care provider in men between ages of 55-69   Medicare will cover every 12 months beginning on the day after your 50th birthday PSA: No results in last 5 years     Screening Not Indicated     Hepatitis C Screening Once for adults born between 1945 and 1965  More frequently in patients at high risk for Hepatitis C Hep C Antibody: 09/14/2023    Screening Current   Diabetes Screening 1-2 times per year if you're at risk for diabetes or have pre-diabetes Fasting glucose: 99 mg/dL (9/10/2024)  A1C: No results in last 5 years (No results in last 5  years)  Screening Current   Cholesterol Screening Once every 5 years if you don't have a lipid disorder. May order more often based on risk factors. Lipid panel: 09/10/2024  Screening Current      Other Preventive Screenings Covered by Medicare:  Abdominal Aortic Aneurysm (AAA) Screening: covered once if your at risk. You're considered to be at risk if you have a family history of AAA or a male between the age of 65-75 who smoking at least 100 cigarettes in your lifetime.  Lung Cancer Screening: covers low dose CT scan once per year if you meet all of the following conditions: (1) Age 55-77; (2) No signs or symptoms of lung cancer; (3) Current smoker or have quit smoking within the last 15 years; (4) You have a tobacco smoking history of at least 20 pack years (packs per day x number of years you smoked); (5) You get a written order from a healthcare provider.  Glaucoma Screening: covered annually if you're considered high risk: (1) You have diabetes OR (2) Family history of glaucoma OR (3)  aged 50 and older OR (4)  American aged 65 and older  Osteoporosis Screening: covered every 2 years if you meet one of the following conditions: (1) Have a vertebral abnormality; (2) On glucocorticoid therapy for more than 3 months; (3) Have primary hyperparathyroidism; (4) On osteoporosis medications and need to assess response to drug therapy.  HIV Screening: covered annually if you're between the age of 15-65. Also covered annually if you are younger than 15 and older than 65 with risk factors for HIV infection. For pregnant patients, it is covered up to 3 times per pregnancy.    Immunizations:  Immunization Recommendations   Influenza Vaccine Annual influenza vaccination during flu season is recommended for all persons aged >= 6 months who do not have contraindications   Pneumococcal Vaccine   * Pneumococcal conjugate vaccine = PCV13 (Prevnar 13), PCV15 (Vaxneuvance), PCV20 (Prevnar 20)  *  Pneumococcal polysaccharide vaccine = PPSV23 (Pneumovax) Adults 19-65 yo with certain risk factors or if 65+ yo  If never received any pneumonia vaccine: recommend Prevnar 20 (PCV20)  Give PCV20 if previously received 1 dose of PCV13 or PPSV23   Hepatitis B Vaccine 3 dose series if at intermediate or high risk (ex: diabetes, end stage renal disease, liver disease)   Respiratory syncytial virus (RSV) Vaccine - COVERED BY MEDICARE PART D  * RSVPreF3 (Arexvy) CDC recommends that adults 60 years of age and older may receive a single dose of RSV vaccine using shared clinical decision-making (SCDM)   Tetanus (Td) Vaccine - COST NOT COVERED BY MEDICARE PART B Following completion of primary series, a booster dose should be given every 10 years to maintain immunity against tetanus. Td may also be given as tetanus wound prophylaxis.   Tdap Vaccine - COST NOT COVERED BY MEDICARE PART B Recommended at least once for all adults. For pregnant patients, recommended with each pregnancy.   Shingles Vaccine (Shingrix) - COST NOT COVERED BY MEDICARE PART B  2 shot series recommended in those 19 years and older who have or will have weakened immune systems or those 50 years and older     Health Maintenance Due:      Topic Date Due   • Colorectal Cancer Screening  09/16/2029   • HIV Screening  Completed   • Hepatitis C Screening  Completed     Immunizations Due:      Topic Date Due   • Influenza Vaccine (1) 09/01/2024   • COVID-19 Vaccine (1 - 2024-25 season) Never done     Advance Directives   What are advance directives?  Advance directives are legal documents that state your wishes and plans for medical care. These plans are made ahead of time in case you lose your ability to make decisions for yourself. Advance directives can apply to any medical decision, such as the treatments you want, and if you want to donate organs.   What are the types of advance directives?  There are many types of advance directives, and each state has  rules about how to use them. You may choose a combination of any of the following:  Living will:  This is a written record of the treatment you want. You can also choose which treatments you do not want, which to limit, and which to stop at a certain time. This includes surgery, medicine, IV fluid, and tube feedings.   Durable power of  for healthcare (DPAHC):  This is a written record that states who you want to make healthcare choices for you when you are unable to make them for yourself. This person, called a proxy, is usually a family member or a friend. You may choose more than 1 proxy.  Do not resuscitate (DNR) order:  A DNR order is used in case your heart stops beating or you stop breathing. It is a request not to have certain forms of treatment, such as CPR. A DNR order may be included in other types of advance directives.  Medical directive:  This covers the care that you want if you are in a coma, near death, or unable to make decisions for yourself. You can list the treatments you want for each condition. Treatment may include pain medicine, surgery, blood transfusions, dialysis, IV or tube feedings, and a ventilator (breathing machine).  Values history:  This document has questions about your views, beliefs, and how you feel and think about life. This information can help others choose the care that you would choose.  Why are advance directives important?  An advance directive helps you control your care. Although spoken wishes may be used, it is better to have your wishes written down. Spoken wishes can be misunderstood, or not followed. Treatments may be given even if you do not want them. An advance directive may make it easier for your family to make difficult choices about your care.   Depression   Depression  is a medical condition that causes feelings of sadness or hopelessness that do not go away. Depression may cause you to lose interest in things you used to enjoy. These feelings may  interfere with your daily life.  Call your local emergency number (911 in the [place]) if:   You think about harming yourself or someone else.  You have done something on purpose to hurt yourself.  The following resources are available at any time to help you, if needed:   National Suicide Prevention Lifeline: 1-359.278.2288 (8-156-456-TALK)   Suicide Hotline: 1-185.207.7726 (7-756-OCJMZFY)   For a list of international numbers: https://save.org/find-help/international-resources/  Treatment for depression may include medicine to relieve depression. Medicine is often used together with therapy. Therapy is a way for you to talk about your feelings and anything that may be causing depression. Therapy can be done alone or in a group. It may also be done with family members or a significant other.  Get regular physical activity.    Create a regular sleep schedule.    Eat a variety of healthy foods.    Do not drink alcohol or use drugs.     Weight Management   Why it is important to manage your weight:  Being overweight increases your risk of health conditions such as heart disease, high blood pressure, type 2 diabetes, and certain types of cancer. It can also increase your risk for osteoarthritis, sleep apnea, and other respiratory problems. Aim for a slow, steady weight loss. Even a small amount of weight loss can lower your risk of health problems.  How to lose weight safely:  A safe and healthy way to lose weight is to eat fewer calories and get regular exercise. You can lose up about 1 pound a week by decreasing the number of calories you eat by 500 calories each day.   Healthy meal plan for weight management:  A healthy meal plan includes a variety of foods, contains fewer calories, and helps you stay healthy. A healthy meal plan includes the following:  Eat whole-grain foods more often.  A healthy meal plan should contain fiber. Fiber is the part of grains, fruits, and vegetables that is not broken down by your body.  Whole-grain foods are healthy and provide extra fiber in your diet. Some examples of whole-grain foods are whole-wheat breads and pastas, oatmeal, brown rice, and bulgur.  Eat a variety of vegetables every day.  Include dark, leafy greens such as spinach, kale, henrique greens, and mustard greens. Eat yellow and orange vegetables such as carrots, sweet potatoes, and winter squash.   Eat a variety of fruits every day.  Choose fresh or canned fruit (canned in its own juice or light syrup) instead of juice. Fruit juice has very little or no fiber.  Eat low-fat dairy foods.  Drink fat-free (skim) milk or 1% milk. Eat fat-free yogurt and low-fat cottage cheese. Try low-fat cheeses such as mozzarella and other reduced-fat cheeses.  Choose meat and other protein foods that are low in fat.  Choose beans or other legumes such as split peas or lentils. Choose fish, skinless poultry (chicken or turkey), or lean cuts of red meat (beef or pork). Before you cook meat or poultry, cut off any visible fat.   Use less fat and oil.  Try baking foods instead of frying them. Add less fat, such as margarine, sour cream, regular salad dressing and mayonnaise to foods. Eat fewer high-fat foods. Some examples of high-fat foods include french fries, doughnuts, ice cream, and cakes.  Eat fewer sweets.  Limit foods and drinks that are high in sugar. This includes candy, cookies, regular soda, and sweetened drinks.  Exercise:  Exercise at least 30 minutes per day on most days of the week. Some examples of exercise include walking, biking, dancing, and swimming. You can also fit in more physical activity by taking the stairs instead of the elevator or parking farther away from stores. Ask your healthcare provider about the best exercise plan for you.   Narcotic (Opioid) Safety    Use narcotics safely:  Take prescribed narcotics exactly as directed  Do not give narcotics to others or take narcotics that belong to someone else  Do not mix narcotics  without medicines or alcohol  Do not drive or operate heavy machinery after you take the narcotic  Monitor for side effects and notify your healthcare provider if you experienced side effects such as nausea, sleepiness, itching, or trouble thinking clearly.    Manage constipation:    Constipation is the most common side effect of narcotic medicine. Constipation is when you have hard, dry bowel movements, or you go longer than usual between bowel movements. Tell your healthcare provider about all changes in your bowel movements while you are taking narcotics. He or she may recommend laxative medicine to help you have a bowel movement. He or she may also change the kind of narcotic you are taking, or change when you take it. The following are more ways you can prevent or relieve constipation:    Drink liquids as directed.  You may need to drink extra liquids to help soften and move your bowels. Ask how much liquid to drink each day and which liquids are best for you.  Eat high-fiber foods.  This may help decrease constipation by adding bulk to your bowel movements. High-fiber foods include fruits, vegetables, whole-grain breads and cereals, and beans. Your healthcare provider or dietitian can help you create a high-fiber meal plan. Your provider may also recommend a fiber supplement if you cannot get enough fiber from food.  Exercise regularly.  Regular physical activity can help stimulate your intestines. Walking is a good exercise to prevent or relieve constipation. Ask which exercises are best for you.  Schedule a time each day to have a bowel movement.  This may help train your body to have regular bowel movements. Bend forward while you are on the toilet to help move the bowel movement out. Sit on the toilet for at least 10 minutes, even if you do not have a bowel movement.    Store narcotics safely:   Store narcotics where others cannot easily get them.  Keep them in a locked cabinet or secure area. Do not  keep  them in a purse or other bag you carry with you. A person may be looking for something else and find the narcotics.  Make sure narcotics are stored out of the reach of children.  A child can easily overdose on narcotics. Narcotics may look like candy to a small child.    The best way to dispose of narcotics:      The laws vary by country and area. In the [place], the best way is to return the narcotics through a take-back program. This program is offered by the [place] Drug Enforcement Agency (DANGELO). The following are options for using the program:  Take the narcotics to a DANGELO collection site.  The site is often a law enforcement center. Call your local law enforcement center for scheduled take-back days in your area. You will be given information on where to go if the collection site is in a different location.  Take the narcotics to an approved pharmacy or hospital.  A pharmacy or hospital may be set up as a collection site. You will need to ask if it is a DANGELO collection site if you were not directed there. A pharmacy or doctor's office may not be able to take back narcotics unless it is a DANGELO site.  Use a mail-back system.  This means you are given containers to put the narcotics into. You will then mail them in the containers.  Use a take-back drop box.  This is a place to leave the narcotics at any time. People and animals will not be able to get into the box. Your local law enforcement agency can tell you where to find a drop box in your area.    Other ways to manage pain:   Ask your healthcare provider about non-narcotic medicines to control pain.  Nonprescription medicines include NSAIDs (such as ibuprofen) and acetaminophen. Prescription medicines include muscle relaxers, antidepressants, and steroids.  Pain may be managed without any medicines.  Some ways to relieve pain include massage, aromatherapy, or meditation. Physical or occupational therapy may also help.    For more information:   Drug Enforcement  Administration  [address] [place] [address]  Phone: 0- 980 - 036-6813  Web Address: https://www.deadiversion.[facility] Centeroj.gov/drug_disposal/    [place] Food and Drug Administration  [address] [place] [address]  Phone: 2- 874 - 024-3205  Web Address: http://www.fda.gov     © Copyright [employer] 2018 Information is for End User's use only and may not be sold, redistributed or otherwise used for commercial purposes. All illustrations and images included in CareNotes® are the copyrighted property of [employer].D.A[employer]M., Inc. or [employer]

## 2025-02-27 NOTE — ASSESSMENT & PLAN NOTE
Negative GI workup   He is back at baseline   GI ordered Gannon CT  [facility] and [facility]  He will get us the records

## 2025-02-27 NOTE — PROGRESS NOTES
Name: Manuel Salas      : 1980      MRN: [id number]  Encounter Provider: Gigi Ritchie MD  Encounter Date: 2025   Encounter department: [facility] PRIMARY CARE    Assessment & Plan  Encounter for annual wellness visit (AWV) in Medicare patient         Continuous opioid dependence (HCC)  Feels like current regiment is not helping anymore, is interested in trial a different medication   Will trial norco daily 5-325  UTD with UDS/U tox        Traumatic brain injury, with unknown loss of consciousness status, sequela (HCC)  [facility]  Has a pending MRI Brain   Tried Oral Ketamine to help with anxiety without improvement          Obesity, morbid (HCC)         Weight loss  Negative GI workup   He is back at baseline   GI ordered Gannon CT  [facility] and was [facility]  He will get us the records          Dizziness  Reports episodes of dizziness, with phonophobia, occasional headaches  Reports Occasion Auras, visual aura  Possible migraine?  Has pending MRI head  Reports previous migraine history   Episodes happen 2-3x a week        Other migraine without status migrainosus, not intractable            Preventive health issues were discussed with patient, and age appropriate screening tests were ordered as noted in patient's After Visit Summary. Personalized health advice and appropriate referrals for health education or preventive services given if needed, as noted in patient's After Visit Summary.    History of Present Illness     HPI   Patient Care Team:  Gigi Ritchie MD as PCP - General (Family Medicine)  Shavonne Weston PA-C as Physician Assistant (Physician Assistant)  Lela Barnhart MD (Pain Medicine)    Review of Systems   Constitutional:  Negative for chills and fever.   HENT:  Negative for ear pain and sore throat.    Eyes:  Negative for pain and visual disturbance.   Respiratory:  Negative for cough and shortness of breath.    Cardiovascular:   Negative for chest pain and palpitations.   Gastrointestinal:  Negative for abdominal pain and vomiting.   Genitourinary:  Negative for dysuria and hematuria.   Musculoskeletal:  Negative for arthralgias and back pain.   Skin:  Negative for color change and rash.   Neurological:  Negative for seizures and syncope.   All other systems reviewed and are negative.    Medical History Reviewed by provider this encounter:       Annual Wellness Visit Questionnaire   Manuel is here for his Subsequent Wellness visit.     Health Risk Assessment:   Patient rates overall health as fair. Patient feels that their physical health rating is slightly worse. Patient is satisfied with their life. Eyesight was rated as slightly worse. Hearing was rated as slightly worse. Patient feels that their emotional and mental health rating is same. Patients states they are sometimes angry. Patient states they are always unusually tired/fatigued. Pain experienced in the last 7 days has been a lot. Patient's pain rating has been 8/10. Patient states that he has experienced weight loss or gain in last 6 months.     Depression Screening:   PHQ-2 Score: 3  PHQ-9 Score: 14      Fall Risk Screening:   In the past year, patient has experienced: history of falling in past year      Home Safety:  Patient has trouble with stairs inside or outside of their home. Patient has working smoke alarms and has working carbon monoxide detector. Home safety hazards include: none.     Nutrition:   Current diet is Regular.     Medications:   Patient is currently taking over-the-counter supplements. OTC medications include: see medication list. Patient is not able to manage medications.     Activities of Daily Living (ADLs)/Instrumental Activities of Daily Living (IADLs):   Walk and transfer into and out of bed and chair?: Yes  Dress and groom yourself?: No    Bathe or shower yourself?: Yes    Feed yourself? Yes  Do your laundry/housekeeping?: No  Manage your money, pay  your bills and track your expenses?: No  Make your own meals?: Yes    Do your own shopping?: Yes    Previous Hospitalizations:   Any hospitalizations or ED visits within the last 12 months?: Yes    How many hospitalizations have you had in the last year?: 1-2    Advance Care Planning:   Living will: No    Durable POA for healthcare: No    Advanced directive: No      PREVENTIVE SCREENINGS      Cardiovascular Screening:    General: Screening Current      Diabetes Screening:     General: Screening Current      Colorectal Cancer Screening:     General: Screening Current      Prostate Cancer Screening:    General: Screening Not Indicated      Abdominal Aortic Aneurysm (AAA) Screening:    Risk factors include: tobacco use        Lung Cancer Screening:     General: Screening Not Indicated      Hepatitis C Screening:    General: Screening Current    Screening, Brief Intervention, and Referral to Treatment (SBIRT)     Screening  Typical number of drinks in a day: 0  Typical number of drinks in a week: 0  Interpretation: Low risk drinking behavior.    AUDIT-C Screenin) How often did you have a drink containing alcohol in the past year? monthly or less  2) How many drinks did you have on a typical day when you were drinking in the past year? 1 to 2  3) How often did you have 6 or more drinks on one occasion in the past year? never    AUDIT-C Score: 1  Interpretation: Score 0-3 (male): Negative screen for alcohol misuse    Single Item Drug Screening:  How often have you used an illegal drug (including marijuana) or a prescription medication for non-medical reasons in the past year? never    Single Item Drug Screen Score: 0  Interpretation: Negative screen for possible drug use disorder    Review of Current Opioid Use    Opioid Risk Tool (ORT) Interpretation: Complete Opioid Risk Tool (ORT)    Social Drivers of Health     Financial Resource Strain: Low Risk  (2023)    Overall Financial Resource Strain (CARDIA)      "Difficulty of Paying Living Expenses: Not hard at all   Food Insecurity: Patient Declined (2/26/2025)    Hunger Vital Sign     Worried About Running Out of Food in the Last Year: Patient declined     Ran Out of Food in the Last Year: Patient declined   Transportation Needs: Patient Declined (2/26/2025)    PRAPARE - Transportation     Lack of Transportation (Medical): Patient declined     Lack of Transportation (Non-Medical): Patient declined   Housing Stability: Patient Declined (2/26/2025)    Housing Stability Vital Sign     Unable to Pay for Housing in the Last Year: Patient declined     Homeless in the Last Year: Patient declined   Utilities: Patient Declined (2/26/2025)    [facility] Utilities     Threatened with loss of utilities: Patient declined     No results found.    Objective   /72 (BP Location: Left arm, Patient Position: Sitting, Cuff Size: Large)   Pulse 82   Temp (!) 97.2 °F (36.2 °C) (Tympanic)   Ht 5' 11\" (1.803 m)   Wt 102 kg (224 lb)   SpO2 98%   BMI 31.24 kg/m²     Physical Exam  Constitutional:       Appearance: He is well-developed.   HENT:      Head: Normocephalic.   Eyes:      Pupils: Pupils are equal, round, and reactive to light.   Cardiovascular:      Rate and Rhythm: Normal rate and regular rhythm.   Pulmonary:      Effort: Pulmonary effort is normal.      Breath sounds: Normal breath sounds.   Abdominal:      General: Bowel sounds are normal.      Palpations: Abdomen is soft.   Musculoskeletal:         General: Normal range of motion.      Cervical back: Normal range of motion and neck supple.   Skin:     General: Skin is warm.         "

## 2025-02-28 DIAGNOSIS — R63.4 WEIGHT LOSS: Primary | ICD-10-CM

## 2025-03-13 ENCOUNTER — APPOINTMENT (OUTPATIENT)
Dept: LAB | Facility: MEDICAL CENTER | Age: 45
End: 2025-03-13
Payer: MEDICARE

## 2025-03-13 DIAGNOSIS — R63.4 WEIGHT LOSS: ICD-10-CM

## 2025-03-13 PROCEDURE — 84403 ASSAY OF TOTAL TESTOSTERONE: CPT

## 2025-03-13 PROCEDURE — 36415 COLL VENOUS BLD VENIPUNCTURE: CPT

## 2025-03-13 PROCEDURE — 84402 ASSAY OF FREE TESTOSTERONE: CPT

## 2025-03-14 LAB
TESTOST FREE SERPL-MCNC: 9.2 PG/ML (ref 6.8–21.5)
TESTOST SERPL-MCNC: 409 NG/DL (ref 264–916)

## 2025-03-18 ENCOUNTER — HOSPITAL ENCOUNTER (OUTPATIENT)
Dept: MRI IMAGING | Facility: HOSPITAL | Age: 45
Discharge: HOME | End: 2025-03-18
Payer: COMMERCIAL

## 2025-03-18 DIAGNOSIS — Z87.820 HISTORY OF TRAUMATIC BRAIN INJURY: ICD-10-CM

## 2025-03-18 PROCEDURE — A9585 GADOBUTROL INJECTION: HCPCS | Performed by: STUDENT IN AN ORGANIZED HEALTH CARE EDUCATION/TRAINING PROGRAM

## 2025-03-18 PROCEDURE — 70553 MRI BRAIN STEM W/O & W/DYE: CPT

## 2025-03-18 RX ORDER — GADOBUTROL 604.72 MG/ML
10 INJECTION INTRAVENOUS
Status: COMPLETED | OUTPATIENT
Start: 2025-03-18 | End: 2025-03-18

## 2025-03-18 RX ADMIN — GADOBUTROL 10 ML: 604.72 INJECTION INTRAVENOUS at 16:15

## 2025-03-27 DIAGNOSIS — G43.809 OTHER MIGRAINE WITHOUT STATUS MIGRAINOSUS, NOT INTRACTABLE: ICD-10-CM

## 2025-03-27 DIAGNOSIS — F11.20 CONTINUOUS OPIOID DEPENDENCE (HCC): ICD-10-CM

## 2025-03-28 RX ORDER — HYDROCODONE BITARTRATE AND ACETAMINOPHEN 5; 325 MG/1; MG/1
1 TABLET ORAL DAILY
Qty: 30 TABLET | Refills: 0 | Status: SHIPPED | OUTPATIENT
Start: 2025-03-28

## 2025-03-28 RX ORDER — METOCLOPRAMIDE 10 MG/1
10 TABLET ORAL 4 TIMES DAILY
Qty: 30 TABLET | Refills: 0 | Status: SHIPPED | OUTPATIENT
Start: 2025-03-28

## 2025-04-28 DIAGNOSIS — F11.20 CONTINUOUS OPIOID DEPENDENCE (HCC): ICD-10-CM

## 2025-04-29 RX ORDER — HYDROCODONE BITARTRATE AND ACETAMINOPHEN 5; 325 MG/1; MG/1
1 TABLET ORAL DAILY
Qty: 30 TABLET | Refills: 0 | Status: SHIPPED | OUTPATIENT
Start: 2025-04-29

## 2025-05-07 ENCOUNTER — OFFICE VISIT (OUTPATIENT)
Dept: ENDOCRINOLOGY | Facility: CLINIC | Age: 45
End: 2025-05-07
Payer: MEDICARE

## 2025-05-07 VITALS
RESPIRATION RATE: 18 BRPM | WEIGHT: 235.4 LBS | TEMPERATURE: 98.6 F | SYSTOLIC BLOOD PRESSURE: 120 MMHG | BODY MASS INDEX: 32.96 KG/M2 | HEART RATE: 68 BPM | DIASTOLIC BLOOD PRESSURE: 70 MMHG | HEIGHT: 71 IN | OXYGEN SATURATION: 97 %

## 2025-05-07 DIAGNOSIS — R63.4 UNEXPLAINED WEIGHT LOSS: ICD-10-CM

## 2025-05-07 DIAGNOSIS — R90.89 ABNORMAL FINDING ON MRI OF BRAIN: Primary | ICD-10-CM

## 2025-05-07 PROCEDURE — 99204 OFFICE O/P NEW MOD 45 MIN: CPT | Performed by: STUDENT IN AN ORGANIZED HEALTH CARE EDUCATION/TRAINING PROGRAM

## 2025-05-07 NOTE — PROGRESS NOTES
Name: Manuel Salas      : 1980      MRN: [id number]  Encounter Provider: Himanshu Muhammad MD  Encounter Date: 2025   Encounter department: [facility] [place]    Chief Complaint   Patient presents with   • New Patient Visit   :  Assessment & Plan  Unexplained weight loss  The patient has experienced significant weight loss without trying, previous evaluation by gastroenterology team unremarkable,   Although low clinical suspicious, ACTH and a.m. cortisol to rule out adrenal sufficiency ordered.    Orders:  •  ACTH  •  Cortisol Level, AM Specimen    Abnormal finding on MRI of brain  And incidental finding of hypoenhancing 3 to 4 mm focus within the posterior pituitary gland with differentials of Rathke cleft cyst versus pituitary microadenoma, although repeated pituitary dedicated [facility] showed normal result, although there is no record available and he was advised to request copy of the report to be faxed to us for review.  He does not exhibit any stigmata of pituitary hormonal overproduction or underproduction, except for unexplained weight loss for which I ordered a.m. cortisol and ACTH.  I defer further testing or imaging, given reportedly normal pituitary MRI.         Follow-up  The patient will follow up in 6 months.      History of Present Illness   History of Present Illness    Manuel Salas is a 45 y.o. male is referred for evaluation of abnormal pituitary gland findings on brain MRI at the request of Gigi Ritchie MD    a brain MRI conducted on 2025 at [facility], prompted by a history of traumatic brain injury. The MRI revealed a small 3 to 4 mm focus on the posterior part of the pituitary gland, suggestive of a cyst or adenoma. A subsequent pituitary-specific MRI ordered by [facility] approximately 2 weeks ago showed no abnormalities.    Over the past year, he has experienced unexplained weight loss  of 50 pounds, persistent fatigue, and bed rest for nearly six months. Despite multiple emergency room visits and gastrointestinal studies, no abnormalities were found in his blood work. His symptoms have evolved to include dizziness, confusion, vision disturbances characterized by bright white flashes, severe jaw pain leading to headaches, and body-wide tingling sensations. He has also experienced episodes of complete vision loss lasting approximately 20 minutes, occurring four times to date. He reports no peripheral vision issues but does experience bright lights in his central vision. He continues to experience intermittent tingling sensations in his head. He has undergone testosterone testing and a chest, abdomen and pelvis CT scan, both of which were normal. His weight has fluctuated from 255 pounds to a low of 214 pounds, and he is currently regaining weight. He reports no breast discharge, changes in ring or shoe size, or facial hair loss. He has a history of erectile dysfunction and notes changes in his sex drive. He also reports fluctuations in his appetite, ranging from excessive eating to complete loss of appetite.          Pertinent Medical History            Review of Systems as per [facility]  Medical History Reviewed by provider this encounter:     .  Current Outpatient Medications on File Prior to Visit   Medication Sig Dispense Refill   • HYDROcodone-acetaminophen (Norco) 5-325 mg per tablet Take 1 tablet by mouth in the morning Max Daily Amount: 1 tablet 30 tablet 0   • metoclopramide (Reglan) 10 mg tablet Take 1 tablet (10 mg total) by mouth 4 (four) times a day 30 tablet 0   • sildenafil (VIAGRA) 100 mg tablet Take 1 tablet (100 mg total) by mouth daily as needed for erectile dysfunction 20 tablet 0   • SUMAtriptan (IMITREX) 50 mg tablet Take 1 tablet (50 mg total) by mouth once as needed for migraine for up to 10 doses may repeat in 2 hours if necessary 10 tablet 0   • acetaminophen-codeine  "(TYLENOL/CODEINE #3) 300-30 MG per tablet Take 1 tablet by mouth every 4 (four) hours as needed for moderate pain (Patient not taking: Reported on 5/7/2025) 30 tablet 0   • cetirizine (ZyrTEC) 10 mg tablet Take 10 mg by mouth daily (Patient not taking: Reported on 5/7/2025)     • fluticasone (FLONASE) 50 mcg/act nasal spray into each nostril (Patient not taking: Reported on 5/7/2025)     • ibuprofen (MOTRIN) 800 mg tablet Take 1 tablet (800 mg total) by mouth 3 (three) times a day as needed for mild pain (Patient not taking: Reported on 5/7/2025) 90 tablet 0   • lisinopril (ZESTRIL) 10 mg tablet Take 10 mg by mouth daily (Patient not taking: Reported on 5/7/2025)       No current facility-administered medications on file prior to visit.         Medical History Reviewed by provider this encounter:     .    Objective   /70 (BP Location: Right arm, Patient Position: Sitting, Cuff Size: Large)   Pulse 68   Temp 98.6 °F (37 °C) (Temporal)   Resp 18   Ht 5' 11\" (1.803 m)   Wt 107 kg (235 lb 6.4 oz)   SpO2 97%   BMI 32.83 kg/m²      Body mass index is 32.83 kg/m².  Wt Readings from Last 3 Encounters:   05/07/25 107 kg (235 lb 6.4 oz)   02/27/25 102 kg (224 lb)   09/17/24 101 kg (222 lb 3.6 oz)     Physical Exam  Constitutional:       General: He is not in acute distress.     Appearance: He is not ill-appearing.   HENT:      Head: Normocephalic and atraumatic.   Neck:      Thyroid: No thyromegaly.   Cardiovascular:      Rate and Rhythm: Normal rate.   Pulmonary:      Effort: Pulmonary effort is normal. No respiratory distress.   Neurological:      Mental Status: He is oriented to person, place, and time.       Physical Exam  Thyroid size is normal.  Heart was examined.    Results  Imaging  Brain MRI done on 03/18/2025 showed a very small 3 to 4 mm foci on the posterior part of pituitary gland, likely a cyst or adenoma. Pituitary specific MRI showed no abnormalities. Whole body CT scan was normal.  Labs:   No " "results found for: \"HGBA1C\"  Lab Results   Component Value Date    CREATININE 0.94 09/16/2024    CREATININE 0.88 09/10/2024    CREATININE 0.84 09/14/2023    BUN 20 09/16/2024    K 4.4 09/16/2024     09/16/2024    CO2 26 09/16/2024     eGFRcr   Date Value Ref Range Status   09/19/2022 113 >59 Final     eGFR   Date Value Ref Range Status   09/16/2024 98 ml/min/1.73sq m Final     Lab Results   Component Value Date    HDL 40 09/10/2024    TRIG 154 (H) 09/10/2024     Lab Results   Component Value Date    ALT 41 09/16/2024    AST 22 09/16/2024    ALKPHOS 80 09/16/2024     Lab Results   Component Value Date    CFR5XEJBTOAN 1.910 09/10/2024     MRI BRAIN WITH AND WITHOUT CONTRAST     INDICATION: Z87.820: Personal history of traumatic brain injury.     COMPARISON:  None.     TECHNIQUE:  Multiplanar, multisequence imaging of the brain was performed before and after gadolinium administration.        IV Contrast:  10 mL of Gadobutrol injection     IMAGE QUALITY:   Diagnostic.     FINDINGS:     BRAIN PARENCHYMA:  There is no discrete mass, mass effect or midline shift. There is no intracranial hemorrhage.  Normal posterior fossa.  Diffusion imaging is unremarkable.     There is a nonspecific focus of FLAIR signal hyperintensity within the right parietal lobe white matter on series 5, image 18. There are otherwise no discrete areas of signal abnormality identified.     Postcontrast imaging of the brain demonstrates no abnormal enhancement.     VENTRICLES:  Normal for the patient's age.     SELLA AND PITUITARY GLAND: There is a hypoenhancing lesion within the pituitary gland measuring 3 to 4 mm on series 9, image 14.     ORBITS:  Normal.     PARANASAL SINUSES: Polypoid mucosal thickening of the paranasal sinuses.     VASCULATURE:  Evaluation of the major intracranial vasculature demonstrates appropriate flow voids.     CALVARIUM AND SKULL BASE:  Normal.     EXTRACRANIAL SOFT TISSUES:  Normal.     IMPRESSION:     No mass " "effect, acute intracranial hemorrhage or evidence of recent infarction.     Nonspecific focus of FLAIR signal hyperintensity within the right parietal lobe white matter. Differential diagnosis should include chronic lacunar ischemia, migraines, sequelae of Lyme disease as well as other demyelinating and inflammatory etiologies.     Hypoenhancing 3 to 4 mm focus within the posterior pituitary gland may represent a small cyst or adenoma. Dedicated imaging of the pituitary gland is recommended for further evaluation.    No results found for: \"FREET4\", \"TSI\"    There are no Patient Instructions on file for this visit.    Discussed with the patient and all questioned fully answered. He will call me if any problems arise.    Administrative Statements   I have spent a total time of 35 minutes in caring for this patient on the day of the visit/encounter including Diagnostic results, Prognosis, Risks and benefits of tx options, Impressions, Counseling / Coordination of care, Documenting in the medical record, Reviewing/placing orders in the medical record (including tests, medications, and/or procedures), and Obtaining or reviewing history  .  "

## 2025-05-07 NOTE — ASSESSMENT & PLAN NOTE
And incidental finding of hypoenhancing 3 to 4 mm focus within the posterior pituitary gland with differentials of Rathke cleft cyst versus pituitary microadenoma, although repeated pituitary dedicated [facility] showed normal result, although there is no record available and he was advised to request copy of the report to be faxed to us for review.  He does not exhibit any stigmata of pituitary hormonal overproduction or underproduction, except for unexplained weight loss for which I ordered a.m. cortisol and ACTH.  I defer further testing or imaging, given reportedly normal pituitary MRI.

## 2025-05-14 ENCOUNTER — APPOINTMENT (OUTPATIENT)
Dept: LAB | Facility: CLINIC | Age: 45
End: 2025-05-14
Payer: MEDICARE

## 2025-05-14 LAB — CORTIS AM PEAK SERPL-MCNC: 14.1 UG/DL (ref 6.7–22.6)

## 2025-05-14 PROCEDURE — 82024 ASSAY OF ACTH: CPT | Performed by: STUDENT IN AN ORGANIZED HEALTH CARE EDUCATION/TRAINING PROGRAM

## 2025-05-14 PROCEDURE — 82533 TOTAL CORTISOL: CPT | Performed by: STUDENT IN AN ORGANIZED HEALTH CARE EDUCATION/TRAINING PROGRAM

## 2025-05-14 PROCEDURE — 36415 COLL VENOUS BLD VENIPUNCTURE: CPT | Performed by: STUDENT IN AN ORGANIZED HEALTH CARE EDUCATION/TRAINING PROGRAM

## 2025-05-15 ENCOUNTER — RESULTS FOLLOW-UP (OUTPATIENT)
Dept: ENDOCRINOLOGY | Facility: CLINIC | Age: 45
End: 2025-05-15

## 2025-05-16 LAB — ACTH PLAS-MCNC: 28.3 PG/ML (ref 7.2–63.3)

## 2025-05-22 NOTE — TELEPHONE ENCOUNTER
Received a call back from the patient. Unable to reach a CTS team member to review results. Please reach out to the patient in regards. Thanks!

## 2025-05-28 DIAGNOSIS — F11.20 CONTINUOUS OPIOID DEPENDENCE (HCC): ICD-10-CM

## 2025-05-29 DIAGNOSIS — F11.20 CONTINUOUS OPIOID DEPENDENCE (HCC): ICD-10-CM

## 2025-05-29 RX ORDER — HYDROCODONE BITARTRATE AND ACETAMINOPHEN 5; 325 MG/1; MG/1
1 TABLET ORAL DAILY
Qty: 30 TABLET | Refills: 0 | Status: SHIPPED | OUTPATIENT
Start: 2025-05-29

## 2025-05-29 NOTE — TELEPHONE ENCOUNTER
NOT A DUPLICATE:   Patient states was informed by [facility] that they are out of stock of medication. Requesting script forwarded to [facility].     Reason for call:   [x] Refill   [] Prior Auth  [x] Other: Alternate pharmacy    Office:   [x] PCP/Provider -   [] Specialty/Provider -     Medication: HYDROcodone-acetaminophen (Norco) 5-325 mg     Dose/Frequency:  Take 1 tablet by mouth in the morning     Quantity: 30    Pharmacy:   [id number] [place], [place] - [address] [place] [address] Pharmacy   Does the patient have enough for 3 days?   [] Yes   [x] No - Send as HP to POD    Mail Away Pharmacy   Does the patient have enough for 10 days?   [] Yes   [] No - Send as HP to POD

## 2025-05-30 RX ORDER — HYDROCODONE BITARTRATE AND ACETAMINOPHEN 5; 325 MG/1; MG/1
1 TABLET ORAL DAILY
Qty: 30 TABLET | Refills: 0 | Status: CANCELLED | OUTPATIENT
Start: 2025-05-30

## 2025-05-30 RX ORDER — HYDROCODONE BITARTRATE AND ACETAMINOPHEN 5; 325 MG/1; MG/1
1 TABLET ORAL DAILY
Qty: 30 TABLET | Refills: 0 | OUTPATIENT
Start: 2025-05-30

## 2025-05-30 NOTE — TELEPHONE ENCOUNTER
Message relayed to patient he just received a text from [facility] that his medication is now in stock. Patient is already on his way out of town so he will just pick the script up from [facility] when he is back.

## 2025-05-30 NOTE — TELEPHONE ENCOUNTER
I cannot send controlled substances across state lines.  This is not only a [facility] policy but also a medical license policy.   [NL] : warm

## 2025-07-01 ENCOUNTER — TELEPHONE (OUTPATIENT)
Dept: FAMILY MEDICINE CLINIC | Facility: CLINIC | Age: 45
End: 2025-07-01

## 2025-07-01 DIAGNOSIS — F11.20 CONTINUOUS OPIOID DEPENDENCE (HCC): ICD-10-CM

## 2025-07-02 RX ORDER — HYDROCODONE BITARTRATE AND ACETAMINOPHEN 5; 325 MG/1; MG/1
1 TABLET ORAL DAILY
Qty: 30 TABLET | Refills: 0 | Status: SHIPPED | OUTPATIENT
Start: 2025-07-02 | End: 2025-07-04 | Stop reason: SDUPTHER

## 2025-07-03 ENCOUNTER — TELEPHONE (OUTPATIENT)
Age: 45
End: 2025-07-03

## 2025-07-03 NOTE — TELEPHONE ENCOUNTER
Patient went to  the Hydrocodone from [facility] and was told the doctors office did not sent over the correct RX   Since its a controlled substance needs to know why he's taking it.   Can someone call the pharmacy

## 2025-07-03 NOTE — TELEPHONE ENCOUNTER
Left detailed vm at [facility]. Pt is taking this medication for chronic back pain

## 2025-07-03 NOTE — TELEPHONE ENCOUNTER
Nereida from [facility] called for Jenifer regarding patients HYDROcodone-acetaminophen (Norco) 5-325 mg per tablet script.  [facility] needs a new script with the diagnosis code back pain added to the script for the medication to be dispensed.     Can the new script be sent to [id number]?    Please advise, thank you.

## 2025-07-04 DIAGNOSIS — F11.20 CONTINUOUS OPIOID DEPENDENCE (HCC): ICD-10-CM

## 2025-07-04 DIAGNOSIS — M50.223 HERNIATION OF INTERVERTEBRAL DISC AT C6-C7 LEVEL: Primary | ICD-10-CM

## 2025-07-04 RX ORDER — HYDROCODONE BITARTRATE AND ACETAMINOPHEN 5; 325 MG/1; MG/1
1 TABLET ORAL DAILY
Qty: 30 TABLET | Refills: 0 | Status: SHIPPED | OUTPATIENT
Start: 2025-07-04

## 2025-08-06 DIAGNOSIS — F11.20 CONTINUOUS OPIOID DEPENDENCE (HCC): ICD-10-CM

## 2025-08-06 DIAGNOSIS — M50.223 HERNIATION OF INTERVERTEBRAL DISC AT C6-C7 LEVEL: ICD-10-CM

## 2025-08-06 RX ORDER — HYDROCODONE BITARTRATE AND ACETAMINOPHEN 5; 325 MG/1; MG/1
1 TABLET ORAL DAILY
Qty: 30 TABLET | Refills: 0 | Status: SHIPPED | OUTPATIENT
Start: 2025-08-06